# Patient Record
Sex: FEMALE | Race: BLACK OR AFRICAN AMERICAN | NOT HISPANIC OR LATINO | Employment: FULL TIME | URBAN - METROPOLITAN AREA
[De-identification: names, ages, dates, MRNs, and addresses within clinical notes are randomized per-mention and may not be internally consistent; named-entity substitution may affect disease eponyms.]

---

## 2017-01-05 ENCOUNTER — TELEPHONE (OUTPATIENT)
Dept: INTERNAL MEDICINE | Facility: CLINIC | Age: 57
End: 2017-01-05

## 2017-01-05 NOTE — TELEPHONE ENCOUNTER
Spoke to pt. Pt states she is having chest pains and wants to know what to do. Advised pt to go to ER for chest pains and to follow up with dr Cat after. Pt verbalized understanding

## 2017-01-09 ENCOUNTER — CLINICAL SUPPORT (OUTPATIENT)
Dept: CARDIOLOGY | Facility: CLINIC | Age: 57
End: 2017-01-09
Payer: COMMERCIAL

## 2017-01-09 ENCOUNTER — OFFICE VISIT (OUTPATIENT)
Dept: CARDIOLOGY | Facility: CLINIC | Age: 57
End: 2017-01-09
Payer: COMMERCIAL

## 2017-01-09 VITALS
DIASTOLIC BLOOD PRESSURE: 96 MMHG | HEIGHT: 61 IN | BODY MASS INDEX: 23.43 KG/M2 | SYSTOLIC BLOOD PRESSURE: 178 MMHG | WEIGHT: 124.13 LBS | HEART RATE: 75 BPM

## 2017-01-09 DIAGNOSIS — I10 ESSENTIAL HYPERTENSION: ICD-10-CM

## 2017-01-09 DIAGNOSIS — R94.31 ABNORMAL ECG: ICD-10-CM

## 2017-01-09 DIAGNOSIS — E11.9 TYPE 2 DIABETES MELLITUS WITHOUT COMPLICATION, WITH LONG-TERM CURRENT USE OF INSULIN: ICD-10-CM

## 2017-01-09 DIAGNOSIS — Z79.4 TYPE 2 DIABETES MELLITUS WITHOUT COMPLICATION, WITH LONG-TERM CURRENT USE OF INSULIN: ICD-10-CM

## 2017-01-09 DIAGNOSIS — R07.89 ATYPICAL CHEST PAIN: Primary | ICD-10-CM

## 2017-01-09 DIAGNOSIS — R07.9 CHEST PAIN, UNSPECIFIED TYPE: ICD-10-CM

## 2017-01-09 DIAGNOSIS — E78.2 HYPERLIPIDEMIA, MIXED: ICD-10-CM

## 2017-01-09 DIAGNOSIS — R06.09 DOE (DYSPNEA ON EXERTION): ICD-10-CM

## 2017-01-09 PROCEDURE — 99204 OFFICE O/P NEW MOD 45 MIN: CPT | Mod: S$GLB,,, | Performed by: INTERNAL MEDICINE

## 2017-01-09 PROCEDURE — 93000 ELECTROCARDIOGRAM COMPLETE: CPT | Mod: S$GLB,,, | Performed by: INTERNAL MEDICINE

## 2017-01-09 PROCEDURE — 4010F ACE/ARB THERAPY RXD/TAKEN: CPT | Mod: S$GLB,,, | Performed by: INTERNAL MEDICINE

## 2017-01-09 PROCEDURE — 1159F MED LIST DOCD IN RCRD: CPT | Mod: S$GLB,,, | Performed by: INTERNAL MEDICINE

## 2017-01-09 PROCEDURE — 99999 PR PBB SHADOW E&M-EST. PATIENT-LVL III: CPT | Mod: PBBFAC,,, | Performed by: INTERNAL MEDICINE

## 2017-01-09 PROCEDURE — 3077F SYST BP >= 140 MM HG: CPT | Mod: S$GLB,,, | Performed by: INTERNAL MEDICINE

## 2017-01-09 PROCEDURE — 2022F DILAT RTA XM EVC RTNOPTHY: CPT | Mod: S$GLB,,, | Performed by: INTERNAL MEDICINE

## 2017-01-09 PROCEDURE — 3080F DIAST BP >= 90 MM HG: CPT | Mod: S$GLB,,, | Performed by: INTERNAL MEDICINE

## 2017-01-09 PROCEDURE — 3046F HEMOGLOBIN A1C LEVEL >9.0%: CPT | Mod: S$GLB,,, | Performed by: INTERNAL MEDICINE

## 2017-01-09 RX ORDER — LISINOPRIL 5 MG/1
5 TABLET ORAL DAILY
Qty: 90 TABLET | Refills: 3 | Status: SHIPPED | OUTPATIENT
Start: 2017-01-09 | End: 2018-01-19 | Stop reason: DRUGHIGH

## 2017-01-09 NOTE — PROGRESS NOTES
"Subjective:    Patient ID:  Leesa Le is a 56 y.o. female who presents for evaluation of Chest Pain      HPI Mrs. Le presents for evaluation.   Pt last seen by [facility] 1/7/14.   Her current medical conditions include DM, GENTRY, hyperlipidemia. Nonsmoker.   s/p C 12/13 for abnormal stress echo and her coronary arteries were angiographically normal, normal LVEF, normal LVEDP  Now here for eval of cp again.  [facility] dx with chest wall pain, inflammation.  CP mid chest, aching/sharp pain, seconds to 1 minute, nonexertional.  Associated with dyspnea.  Cp x one month or less.  ecg today shows NSR, chronic inferior st-t abnl.  DM remains above goal as well as her lipids (statin intolerant).    Patient Active Problem List   Diagnosis    Type 2 diabetes mellitus without complication    Goiter, nontoxic, multinodular    Drug intolerance    Hyperlipidemia, mixed    Abnormal ECG    DELCID (dyspnea on exertion)    Atypical chest pain     Review of Systems   Constitution: Negative.   HENT: Negative.    Eyes: Negative.    Cardiovascular: Positive for chest pain and dyspnea on exertion.   Respiratory: Positive for shortness of breath.    Endocrine: Negative.    Hematologic/Lymphatic: Negative.    Skin: Negative.    Musculoskeletal: Negative.    Gastrointestinal: Negative.    Genitourinary: Negative.    Neurological: Negative.    Psychiatric/Behavioral: Negative.    Allergic/Immunologic: Negative.        Visit Vitals    BP (!) 178/96 (BP Location: Left arm, Patient Position: Sitting)    Pulse 75    Ht 5' 1" (1.549 m)    Wt 56.3 kg (124 lb 1.9 oz)    BMI 23.45 kg/m2       Wt Readings from Last 3 Encounters:   01/09/17 56.3 kg (124 lb 1.9 oz)   11/07/16 55.5 kg (122 lb 5.7 oz)   07/06/16 54.7 kg (120 lb 9.5 oz)     Temp Readings from Last 3 Encounters:   11/07/16 97.3 °F (36.3 °C) (Tympanic)   07/06/16 98.3 °F (36.8 °C) (Tympanic)   06/08/16 98 °F (36.7 °C) (Tympanic) "     BP Readings from Last 3 Encounters:   01/09/17 (!) 178/96   11/07/16 132/86   07/06/16 118/84     Pulse Readings from Last 3 Encounters:   01/09/17 75   11/07/16 72   07/06/16 75         RECHECK 160/90     Objective:    Physical Exam   Constitutional: She is oriented to person, place, and time. Vital signs are normal. She appears well-developed and well-nourished. She is active and cooperative. She does not have a sickly appearance. She does not appear ill. No distress.   HENT:   Head: Normocephalic.   Neck: Neck supple. Normal carotid pulses, no hepatojugular reflux and no JVD present. Carotid bruit is not present. No thyromegaly present.   Cardiovascular: Normal rate, regular rhythm, S1 normal, S2 normal, normal heart sounds and normal pulses.  PMI is not displaced.  Exam reveals no gallop and no friction rub.    No murmur heard.  Pulses:       Radial pulses are 2+ on the right side, and 2+ on the left side.   Pulmonary/Chest: Effort normal and breath sounds normal. She has no wheezes. She has no rales.   Abdominal: Soft. Normal appearance, normal aorta and bowel sounds are normal. She exhibits no pulsatile liver, no abdominal bruit, no ascites and no mass. There is no splenomegaly or hepatomegaly. There is no tenderness.   Musculoskeletal: She exhibits no edema.   Lymphadenopathy:     She has no cervical adenopathy.   Neurological: She is alert and oriented to person, place, and time.   Skin: Skin is warm. She is not diaphoretic.   Psychiatric: She has a normal mood and affect. Her behavior is normal.   Nursing note and vitals reviewed.      I have reviewed all pertinent labs and cardiac studies.      Chemistry        Component Value Date/Time     11/08/2016 0822    K 4.6 11/08/2016 0822     11/08/2016 0822    CO2 27 11/08/2016 0822    BUN 16 11/08/2016 0822    CREATININE 0.8 11/08/2016 0822     (H) 11/08/2016 0822        Component Value Date/Time    CALCIUM 8.9 11/08/2016 0822    ALKPHOS  82 11/08/2016 0822    AST 17 11/08/2016 0822    ALT 18 11/08/2016 0822    BILITOT 0.4 11/08/2016 0822        Lab Results   Component Value Date    WBC 7.81 06/08/2016    HGB 11.3 (L) 06/08/2016    HCT 33.8 (L) 06/08/2016    MCV 69 (L) 06/08/2016     06/08/2016     Lab Results   Component Value Date    HGBA1C 9.2 (H) 11/08/2016     Lab Results   Component Value Date    CHOL 261 (H) 11/08/2016    CHOL 259 (H) 06/08/2016    CHOL 260 (H) 12/10/2015     Lab Results   Component Value Date    HDL 62 11/08/2016    HDL 69 06/08/2016    HDL 55 12/10/2015     Lab Results   Component Value Date    LDLCALC 169.2 (H) 11/08/2016    LDLCALC 161.6 (H) 06/08/2016    LDLCALC 168.2 (H) 12/10/2015     Lab Results   Component Value Date    TRIG 149 11/08/2016    TRIG 142 06/08/2016    TRIG 184 (H) 12/10/2015     Lab Results   Component Value Date    CHOLHDL 23.8 11/08/2016    CHOLHDL 26.6 06/08/2016    CHOLHDL 21.2 12/10/2015           Assessment:       1. Atypical chest pain    2. DELCID (dyspnea on exertion)    3. Chest pain, unspecified type    4. Type 2 diabetes mellitus without complication, with long-term current use of insulin    5. Hyperlipidemia, mixed    6. Abnormal ECG    7. Essential hypertension         Plan:             ATYPICAL CP, MAY BE STRESS/ANXIETY RELATED.  MULTIPLE RISK FACTORS FOR CAD.  POORLY CONTROLLED DM AND LIPIDS.  ELEVATED BP.    RECOMMENDATIONS:    STRESS CARDIOLITE  ECHOCARDIOGRAM  LISINOPRIL 5 MG DAILY (PT INFORMED OF INDICATIONS, SIDE EFFECT PROFILE)    F/U 3 - 4 WEEKS WITH MID-LEVEL FOR HTN F/U.

## 2017-01-09 NOTE — MR AVS SNAPSHOT
[facility]  [address]  [place] [address]  Phone: [phone]  Fax: [phone]                  Leesa Le   2017 4:40 PM   Office Visit    Description:  Female : 1960   Provider:  Galen Simeon MD   Department:  [facility] - Cardiology           Reason for Visit     Chest Pain           Diagnoses this Visit        Comments    Atypical chest pain    -  Primary     DELCID (dyspnea on exertion)         Chest pain, unspecified type         Type 2 diabetes mellitus without complication, with long-term current use of insulin         Hyperlipidemia, mixed         Abnormal ECG         Essential hypertension                To Do List           Future Appointments        Provider Department Dept Phone    2017 9:00 AM Rosangela Cat MD [facility] Internal Medicine [phone]      Goals (5 Years of Data)     None       These Medications        Disp Refills Start End    lisinopril (PRINIVIL,ZESTRIL) 5 MG tablet 90 tablet 3 2017    Take 1 tablet (5 mg total) by mouth once daily. - Oral    Pharmacy: [facility] [address] [place] [address] [place] [address] [address] AT [facility] Ph #: [phone]         [facility] On Call     [facility] On Call Nurse Care Line -  Assistance  Registered nurses in the [facility] Center provide clinical advisement, health education, appointment booking, and other advisory services.  Call for this free service at [phone].             Medications           Message regarding Medications     Verify the changes and/or additions to your medication regime listed below are the same as discussed with your clinician today.  If any of these changes or additions are incorrect, please notify your healthcare provider.        START taking these NEW medications        Refills    lisinopril (PRINIVIL,ZESTRIL) 5 MG tablet 3    Sig: Take 1 tablet (5 mg total) by mouth once daily.    Class: Normal    Route: Oral           Verify that  "the below list of medications is an accurate representation of the medications you are currently taking.  If none reported, the list may be blank. If incorrect, please contact your healthcare provider. Carry this list with you in case of emergency.           Current Medications     BD INSULIN PEN NEEDLE UF MINI 31 x 3/16 " Ndle USE ONCE DAILY    CONTOUR NEXT STRIPS Strp TEST BLOOD SUGAR FOUR TIMES DAILY    escitalopram oxalate (LEXAPRO) 10 MG tablet Take 1 tablet (10 mg total) by mouth once daily.    glyBURIDE-metformin 5-500 mg (GLUCOVANCE) 5-500 mg Tab TAKE ONE TABLET BY MOUTH TWICE DAILY WITH MEALS    insulin glargine (LANTUS SOLOSTAR) 100 unit/mL (3 mL) InPn pen Inject 35 Units into the skin every evening.    insulin needles, disposable, (BD INSULIN PEN NEEDLE UF MINI) 31 x 3/16 " Ndle To take insulin once daily    LANCETS MISC by Misc.(Non-Drug; Combo Route) route 2 (two) times daily.    NOVOLOG FLEXPEN 100 unit/mL InPn pen 3 (three) times daily with meals. As per SSI    lisinopril (PRINIVIL,ZESTRIL) 5 MG tablet Take 1 tablet (5 mg total) by mouth once daily.           Clinical Reference Information           Vital Signs - Last Recorded  Most recent update: 1/9/2017  4:44 PM by Chica Lozano    BP Pulse Ht Wt BMI    (!) 178/96 (BP Location: Left arm, Patient Position: Sitting) 75 5' 1" (1.549 m) 56.3 kg (124 lb 1.9 oz) 23.45 kg/m2      Blood Pressure          Most Recent Value    Right Arm BP - Sitting  172/98    Left Arm BP - Sitting  178/96    BP  (!)  178/96      Allergies as of 1/9/2017     Pcn [Penicillins]    Statins-hmg-coa Reductase Inhibitors      Immunizations Administered on Date of Encounter - 1/9/2017     None      Orders Placed During Today's Visit     Future Labs/Procedures Expected by Expires    NM Myocardial Perfusion Spect Multi Exer  1/9/2017 1/9/2018    2D echo with color flow doppler  As directed 1/9/2018    NM Multi Tread Stress Cardiac Component  As directed 1/9/2018    SCHEDULED EKG " 12-LEAD (to Muse)  As directed 1/9/2018

## 2017-01-13 ENCOUNTER — CLINICAL SUPPORT (OUTPATIENT)
Dept: CARDIOLOGY | Facility: CLINIC | Age: 57
End: 2017-01-13
Payer: COMMERCIAL

## 2017-01-13 ENCOUNTER — HOSPITAL ENCOUNTER (OUTPATIENT)
Dept: RADIOLOGY | Facility: HOSPITAL | Age: 57
Discharge: HOME | End: 2017-01-13
Attending: INTERNAL MEDICINE
Payer: COMMERCIAL

## 2017-01-13 ENCOUNTER — TELEPHONE (OUTPATIENT)
Dept: CARDIOLOGY | Facility: CLINIC | Age: 57
End: 2017-01-13

## 2017-01-13 DIAGNOSIS — R06.09 DOE (DYSPNEA ON EXERTION): ICD-10-CM

## 2017-01-13 DIAGNOSIS — Z79.4 TYPE 2 DIABETES MELLITUS WITHOUT COMPLICATION, WITH LONG-TERM CURRENT USE OF INSULIN: ICD-10-CM

## 2017-01-13 DIAGNOSIS — R94.31 ABNORMAL ECG: ICD-10-CM

## 2017-01-13 DIAGNOSIS — R07.89 ATYPICAL CHEST PAIN: ICD-10-CM

## 2017-01-13 DIAGNOSIS — E11.9 TYPE 2 DIABETES MELLITUS WITHOUT COMPLICATION, WITH LONG-TERM CURRENT USE OF INSULIN: ICD-10-CM

## 2017-01-13 LAB
DIASTOLIC DYSFUNCTION: NO
DIASTOLIC DYSFUNCTION: NO
ESTIMATED PA SYSTOLIC PRESSURE: 27.21
MITRAL VALVE MOBILITY: NORMAL
RETIRED EF AND QEF - SEE NOTES: 65 (ref 55–65)

## 2017-01-13 PROCEDURE — 93306 TTE W/DOPPLER COMPLETE: CPT | Mod: S$GLB,,, | Performed by: INTERNAL MEDICINE

## 2017-01-13 PROCEDURE — 93015 CV STRESS TEST SUPVJ I&R: CPT | Mod: S$GLB,,, | Performed by: INTERNAL MEDICINE

## 2017-01-13 PROCEDURE — 78452 HT MUSCLE IMAGE SPECT MULT: CPT | Mod: 26,,, | Performed by: INTERNAL MEDICINE

## 2017-01-13 PROCEDURE — 78452 HT MUSCLE IMAGE SPECT MULT: CPT | Mod: TC,PO

## 2017-01-13 NOTE — TELEPHONE ENCOUNTER
Please call pt.  She passed her nuclear stress test  No blockages noted  Echo shows normal heart strength/function.    F/u next week w Jana for HTN check    Dr Simeon

## 2017-01-16 ENCOUNTER — TELEPHONE (OUTPATIENT)
Dept: CARDIOLOGY | Facility: CLINIC | Age: 57
End: 2017-01-16

## 2017-01-16 ENCOUNTER — OFFICE VISIT (OUTPATIENT)
Dept: CARDIOLOGY | Facility: CLINIC | Age: 57
End: 2017-01-16
Payer: COMMERCIAL

## 2017-01-16 ENCOUNTER — LAB VISIT (OUTPATIENT)
Dept: LAB | Facility: HOSPITAL | Age: 57
End: 2017-01-16
Attending: INTERNAL MEDICINE
Payer: COMMERCIAL

## 2017-01-16 VITALS
HEIGHT: 61 IN | BODY MASS INDEX: 20.82 KG/M2 | DIASTOLIC BLOOD PRESSURE: 80 MMHG | SYSTOLIC BLOOD PRESSURE: 150 MMHG | WEIGHT: 110.25 LBS | HEART RATE: 76 BPM

## 2017-01-16 DIAGNOSIS — E78.2 HYPERLIPIDEMIA, MIXED: ICD-10-CM

## 2017-01-16 DIAGNOSIS — R07.89 ATYPICAL CHEST PAIN: ICD-10-CM

## 2017-01-16 DIAGNOSIS — I10 ESSENTIAL HYPERTENSION: ICD-10-CM

## 2017-01-16 DIAGNOSIS — E11.9 TYPE 2 DIABETES MELLITUS WITHOUT COMPLICATION, WITH LONG-TERM CURRENT USE OF INSULIN: ICD-10-CM

## 2017-01-16 DIAGNOSIS — I10 ESSENTIAL HYPERTENSION: Primary | ICD-10-CM

## 2017-01-16 DIAGNOSIS — Z79.4 TYPE 2 DIABETES MELLITUS WITHOUT COMPLICATION, WITH LONG-TERM CURRENT USE OF INSULIN: ICD-10-CM

## 2017-01-16 DIAGNOSIS — R06.09 DOE (DYSPNEA ON EXERTION): ICD-10-CM

## 2017-01-16 LAB
ANION GAP SERPL CALC-SCNC: 14 MMOL/L
BUN SERPL-MCNC: 14 MG/DL
CALCIUM SERPL-MCNC: 9.9 MG/DL
CHLORIDE SERPL-SCNC: 98 MMOL/L
CO2 SERPL-SCNC: 25 MMOL/L
CREAT SERPL-MCNC: 1 MG/DL
EST. GFR  (AFRICAN AMERICAN): >60 ML/MIN/1.73 M^2
EST. GFR  (NON AFRICAN AMERICAN): >60 ML/MIN/1.73 M^2
GLUCOSE SERPL-MCNC: 355 MG/DL
POTASSIUM SERPL-SCNC: 4.3 MMOL/L
SODIUM SERPL-SCNC: 137 MMOL/L

## 2017-01-16 PROCEDURE — 1159F MED LIST DOCD IN RCRD: CPT | Mod: S$GLB,,, | Performed by: PHYSICIAN ASSISTANT

## 2017-01-16 PROCEDURE — 80048 BASIC METABOLIC PNL TOTAL CA: CPT | Mod: PO

## 2017-01-16 PROCEDURE — 99999 PR PBB SHADOW E&M-EST. PATIENT-LVL III: CPT | Mod: PBBFAC,,, | Performed by: PHYSICIAN ASSISTANT

## 2017-01-16 PROCEDURE — 99214 OFFICE O/P EST MOD 30 MIN: CPT | Mod: S$GLB,,, | Performed by: PHYSICIAN ASSISTANT

## 2017-01-16 PROCEDURE — 3046F HEMOGLOBIN A1C LEVEL >9.0%: CPT | Mod: S$GLB,,, | Performed by: PHYSICIAN ASSISTANT

## 2017-01-16 PROCEDURE — 36415 COLL VENOUS BLD VENIPUNCTURE: CPT | Mod: PO

## 2017-01-16 PROCEDURE — 4010F ACE/ARB THERAPY RXD/TAKEN: CPT | Mod: S$GLB,,, | Performed by: PHYSICIAN ASSISTANT

## 2017-01-16 PROCEDURE — 3077F SYST BP >= 140 MM HG: CPT | Mod: S$GLB,,, | Performed by: PHYSICIAN ASSISTANT

## 2017-01-16 PROCEDURE — 2022F DILAT RTA XM EVC RTNOPTHY: CPT | Mod: S$GLB,,, | Performed by: PHYSICIAN ASSISTANT

## 2017-01-16 PROCEDURE — 3079F DIAST BP 80-89 MM HG: CPT | Mod: S$GLB,,, | Performed by: PHYSICIAN ASSISTANT

## 2017-01-16 NOTE — TELEPHONE ENCOUNTER
----- Message from RUBEN Guevara sent at 1/16/2017  2:40 PM CST -----  Please phone patient. Kidney function stable. Please have her increase Lisinopril to 10 mg daily. Call office if any side effects. Needs phone review in 2 weeks for BP check    Thanks

## 2017-01-16 NOTE — PROGRESS NOTES
Subjective:       Patient ID: Leesa Le is a 56 y.o. female.    Chief Complaint: Review test results    HPI   Mrs. Le is a 56 year old female patient with a PMHx of DM, GENTRY, and hyperlipidemia who presents today for follow-up/review of results. Patient previously seen by Dr. Simeon and had MPI stress test and 2D echo for further evaluation of chest pain/SOB. She returns today and states she is doing ok. Still complains of SOB, states it can occur at anytime. Unable to identify any precipitating or alleviating factors. No other symptoms. No recent chest pain episodes. Denies any palpitations, lightheadedness, near syncope, or syncope. BP still elevated but much better than last visit. Patient reports compliance with her medications. Concerned about weight loss (down about 10-12 lbs, but states she has been stressed recently). Tests reviewed and discussed in detail with patient. 2D echo showed normal EF. MPI stress test negative for ischemia. Of note, patient also had LHC in 2013 that showed normal coronaries.     Review of Systems   Constitutional: Positive for unexpected weight change. Negative for activity change, appetite change, chills, fatigue and fever.   HENT: Negative for congestion, ear discharge and sore throat.    Eyes: Negative for discharge.   Respiratory: Positive for shortness of breath (chronic, unchanged). Negative for cough, chest tightness and wheezing.    Cardiovascular: Negative for chest pain, palpitations and leg swelling.   Gastrointestinal: Negative for abdominal pain, constipation, diarrhea, nausea and vomiting.   Endocrine: Negative for cold intolerance and heat intolerance.   Genitourinary: Negative for hematuria.   Musculoskeletal: Negative for arthralgias, back pain and joint swelling.   Skin: Negative for color change, pallor and wound.   Allergic/Immunologic: Negative.    Neurological: Negative for dizziness, seizures, syncope, weakness, light-headedness and numbness.  "  Psychiatric/Behavioral: Negative for hallucinations and sleep disturbance. The patient is not nervous/anxious.        Visit Vitals    BP (!) 150/80    Pulse 76    Ht 5' 1" (1.549 m)    Wt 50 kg (110 lb 3.7 oz)    BMI 20.83 kg/m2     Objective:      Physical Exam   Constitutional: She is oriented to person, place, and time. She appears well-developed and well-nourished. No distress.   HENT:   Head: Normocephalic and atraumatic.   Eyes: EOM are normal. Pupils are equal, round, and reactive to light. Right eye exhibits no discharge. Left eye exhibits no discharge.   Neck: Neck supple. No JVD present. No tracheal deviation present.   Cardiovascular: Normal rate, regular rhythm, S1 normal, S2 normal and normal heart sounds.  Exam reveals no gallop and no friction rub.    No murmur heard.  Pulses:       Radial pulses are 2+ on the right side, and 2+ on the left side.   Pulmonary/Chest: Effort normal and breath sounds normal. No respiratory distress. She has no wheezes. She has no rales.   Abdominal: Soft. Bowel sounds are normal. She exhibits no distension and no mass. There is no tenderness. There is no guarding.   Musculoskeletal: She exhibits no edema.   Neurological: She is alert and oriented to person, place, and time.   Skin: Skin is warm and dry. No rash noted. She is not diaphoretic. No erythema.   Psychiatric: She has a normal mood and affect. Her behavior is normal. Thought content normal.   Nursing note and vitals reviewed.      Impression: NORMAL MYOCARDIAL PERFUSION  1. The perfusion scan is free of evidence for myocardial ischemia or injury.   2. Resting wall motion is physiologic.   3. Resting LV function is normal.   4. The ventricular volumes are normal at rest and stress.   5. The extracardiac distribution of radioactivity is normal.     CONCLUSIONS     1 - Concentric remodeling.     2 - Normal left ventricular systolic function (EF 60-65%).     3 - Normal left ventricular diastolic function. "     4 - Normal right ventricular systolic function .     5 - The estimated PA systolic pressure is 27 mmHg.         C. ANGIOGRAPHIC RESULTS:    DIAGNOSTIC:       Patient has a right dominant coronary artery.        - Left Main Coronary Artery:             The LM is normal. There is LORETTA 3 flow.       - Left Anterior Descending Artery:             The LAD is normal. There is LORETTA 3 flow.       - D1:             The D1 is normal. There is LORETTA 3 flow.       - Left Circumflex Artery:             The LCX is normal. There is LORETTA 3 flow.       - OM1:             The OM1 is normal. There is LORETTA 3 flow.       - Ramus:             The ramus is normal. There is LORETTA 3 flow.       - Right Coronary Artery:             The RCA is normal. There is LORETTA 3 flow.       - Common Femoral Artery:             The right CFA is normal.      D. SUMMARY:    1. Normal coronary arteries.  2. Normal LVEF.    E. RECOMMENDATIONS:    1. Routine post-cath care.  2. Risk factor reductions.  Assessment:       1. Essential hypertension    2. Atypical chest pain    3. DELCID (dyspnea on exertion)    4. Type 2 diabetes mellitus without complication, with long-term current use of insulin    5. Hyperlipidemia, mixed          Patient doing clinically well. Still with SOB, offered pulmonary referral, however patient stated she would like to wait. Stress test and echo negative as well as negative cath in 2013. BP better controlled, still above goal. Will check BMP today and plan on increasing to 10 mg daily if lab ok. Counseled about risk factor modification.   Plan:   -BMP today with phone review, will plan to increase Lisinopril to 10 mg daily  -Continue current meds  -Declined pulmonary referral for now  -Risk factor modification, can f/u with PCP, also advised to discuss weight loss   -RTC prn      Chart reviewed. Dr. Simeon agrees with  plan as outlined above.

## 2017-01-16 NOTE — MR AVS SNAPSHOT
"    [facility] - Cardiology  [address] [place] [address]  Phone: [phone]  Fax: [phone]                  Leesa Le   2017 8:00 AM   Office Visit    Description:  Female : 1960   Provider:  RUBEN Guevara   [facility]  [facility] - Cardiology           Reason for Visit     Results           Diagnoses this Visit        Comments    Essential hypertension    -  Primary     Atypical chest pain         DELCID (dyspnea on exertion)         Type 2 diabetes mellitus without complication, with long-term current use of insulin         Hyperlipidemia, mixed                To Do List           Future Appointments        Provider Department Dept Phone    2017 9:00 AM Rosangela Cat MD [facility] Internal Medicine [phone]      Goals (5 Years of Data)     None      [facility]     [facility] On Call Nurse Care Line -  Assistance  Registered nurses in the [facility] On Call Center provide clinical advisement, health education, appointment booking, and other advisory services.  Call for this free service at [phone].             Medications           Message regarding Medications     Verify the changes and/or additions to your medication regime listed below are the same as discussed with your clinician today.  If any of these changes or additions are incorrect, please notify your healthcare provider.             Verify that the below list of medications is an accurate representation of the medications you are currently taking.  If none reported, the list may be blank. If incorrect, please contact your healthcare provider. Carry this list with you in case of emergency.           Current Medications     BD INSULIN PEN NEEDLE UF MINI 31 x 3/16 " Ndle USE ONCE DAILY    CONTOUR NEXT STRIPS Strp TEST BLOOD SUGAR FOUR TIMES DAILY    escitalopram oxalate (LEXAPRO) 10 MG tablet Take 1 tablet (10 mg total) by mouth once daily.    glyBURIDE-metformin 5-500 mg (GLUCOVANCE) 5-500 mg Tab TAKE ONE " "TABLET BY MOUTH TWICE DAILY WITH MEALS    insulin glargine (LANTUS SOLOSTAR) 100 unit/mL (3 mL) InPn pen Inject 35 Units into the skin every evening.    insulin needles, disposable, (BD INSULIN PEN NEEDLE UF MINI) 31 x 3/16 " Ndle To take insulin once daily    LANCETS MISC by Misc.(Non-Drug; Combo Route) route 2 (two) times daily.    lisinopril (PRINIVIL,ZESTRIL) 5 MG tablet Take 1 tablet (5 mg total) by mouth once daily.    NOVOLOG FLEXPEN 100 unit/mL InPn pen 3 (three) times daily with meals. As per SSI           Clinical Reference Information           Vital Signs - Last Recorded  Most recent update: 1/16/2017  8:13 AM by Nalini Beard MA    BP Pulse Ht Wt BMI    (!) 150/80 76 5' 1" (1.549 m) 50 kg (110 lb 3.7 oz) 20.83 kg/m2      Blood Pressure          Most Recent Value    BP  (!)  150/80      Allergies as of 1/16/2017     Pcn [Penicillins]    Statins-hmg-coa Reductase Inhibitors      Immunizations Administered on Date of Encounter - 1/16/2017     None      Orders Placed During Today's Visit     Future Labs/Procedures Expected by Expires    Basic metabolic panel  1/16/2017 3/17/2018      "

## 2017-01-31 ENCOUNTER — TELEPHONE (OUTPATIENT)
Dept: CARDIOLOGY | Facility: CLINIC | Age: 57
End: 2017-01-31

## 2017-01-31 NOTE — TELEPHONE ENCOUNTER
----- Message from Nalini Beard MA sent at 1/16/2017  3:17 PM CST -----  Regarding: phone review  Phone review 2 week bp check.

## 2017-02-07 ENCOUNTER — TELEPHONE (OUTPATIENT)
Dept: CARDIOLOGY | Facility: CLINIC | Age: 57
End: 2017-02-07

## 2017-03-02 ENCOUNTER — OFFICE VISIT (OUTPATIENT)
Dept: INTERNAL MEDICINE | Facility: CLINIC | Age: 57
End: 2017-03-02
Payer: COMMERCIAL

## 2017-03-02 VITALS
OXYGEN SATURATION: 99 % | TEMPERATURE: 102 F | HEIGHT: 61 IN | SYSTOLIC BLOOD PRESSURE: 160 MMHG | BODY MASS INDEX: 23.14 KG/M2 | WEIGHT: 122.56 LBS | DIASTOLIC BLOOD PRESSURE: 84 MMHG | HEART RATE: 112 BPM

## 2017-03-02 DIAGNOSIS — J11.1 CORYZA WITH FLU: ICD-10-CM

## 2017-03-02 DIAGNOSIS — J00 CORYZA: Primary | ICD-10-CM

## 2017-03-02 LAB
DEPRECATED S PYO AG THROAT QL EIA: NEGATIVE
FLUAV AG SPEC QL IA: NEGATIVE
FLUBV AG SPEC QL IA: NEGATIVE
SPECIMEN SOURCE: NORMAL

## 2017-03-02 PROCEDURE — 87400 INFLUENZA A/B EACH AG IA: CPT | Mod: PO

## 2017-03-02 PROCEDURE — 99999 PR PBB SHADOW E&M-EST. PATIENT-LVL III: CPT | Mod: PBBFAC,,, | Performed by: PHYSICIAN ASSISTANT

## 2017-03-02 PROCEDURE — 3079F DIAST BP 80-89 MM HG: CPT | Mod: S$GLB,,, | Performed by: PHYSICIAN ASSISTANT

## 2017-03-02 PROCEDURE — 1160F RVW MEDS BY RX/DR IN RCRD: CPT | Mod: S$GLB,,, | Performed by: PHYSICIAN ASSISTANT

## 2017-03-02 PROCEDURE — 87081 CULTURE SCREEN ONLY: CPT

## 2017-03-02 PROCEDURE — 3077F SYST BP >= 140 MM HG: CPT | Mod: S$GLB,,, | Performed by: PHYSICIAN ASSISTANT

## 2017-03-02 PROCEDURE — 99213 OFFICE O/P EST LOW 20 MIN: CPT | Mod: S$GLB,,, | Performed by: PHYSICIAN ASSISTANT

## 2017-03-02 PROCEDURE — 87880 STREP A ASSAY W/OPTIC: CPT | Mod: PO

## 2017-03-02 RX ORDER — OSELTAMIVIR PHOSPHATE 75 MG/1
75 CAPSULE ORAL 2 TIMES DAILY
Qty: 10 CAPSULE | Refills: 0 | Status: SHIPPED | OUTPATIENT
Start: 2017-03-02 | End: 2017-03-07

## 2017-03-02 NOTE — MR AVS SNAPSHOT
"    [facility]  [address] [facility] [address] [address] [place] [address]  Phone: [phone]  Fax: [phone]                  Leesa Le   3/2/2017 1:40 PM   Office Visit    Description:  Female : 1960   Provider:  Sheyla Eaton PA-C   Department:  [facility]           Reason for Visit     URI     Sore Throat     Generalized Body Aches           Diagnoses this Visit        Comments    [place]    -  Primary            To Do List           Future Appointments        Provider Department Dept Phone    2017 9:00 AM Rosangela Cat MD [facility] [phone]      Goals (5 Years of Data)     None      Follow-Up and Disposition     Return if symptoms worsen or fail to improve.      [facility]     [facility] On [facility] Nurse Care Line -  Assistance  Registered nurses in the [facility] provide clinical advisement, health education, appointment booking, and other advisory services.  Call for this free service at [phone].             Medications           Message regarding Medications     Verify the changes and/or additions to your medication regime listed below are the same as discussed with your clinician today.  If any of these changes or additions are incorrect, please notify your healthcare provider.             Verify that the below list of medications is an accurate representation of the medications you are currently taking.  If none reported, the list may be blank. If incorrect, please contact your healthcare provider. Carry this list with you in case of emergency.           Current Medications     BD INSULIN PEN NEEDLE UF MINI 31 x 3/16 " Ndle USE ONCE DAILY    CONTOUR NEXT STRIPS Strp TEST BLOOD SUGAR FOUR TIMES DAILY    escitalopram oxalate (LEXAPRO) 10 MG tablet Take 1 tablet (10 mg total) by mouth once daily.    glyBURIDE-metformin 5-500 mg (GLUCOVANCE) 5-500 mg Tab TAKE ONE TABLET BY MOUTH TWICE DAILY WITH MEALS    insulin glargine " "(LANTUS SOLOSTAR) 100 unit/mL (3 mL) InPn pen Inject 35 Units into the skin every evening.    insulin needles, disposable, (BD INSULIN PEN NEEDLE UF MINI) 31 x 3/16 " Ndle To take insulin once daily    LANCETS MISC by Misc.(Non-Drug; Combo Route) route 2 (two) times daily.    lisinopril (PRINIVIL,ZESTRIL) 5 MG tablet Take 1 tablet (5 mg total) by mouth once daily.    NOVOLOG FLEXPEN 100 unit/mL InPn pen 3 (three) times daily with meals. As per SSI           Clinical Reference Information           Your Vitals Were     BP                   160/84           Blood Pressure          Most Recent Value    BP  (!)  160/84      Allergies as of 3/2/2017     Pcn [Penicillins]    Statins-hmg-coa Reductase Inhibitors      Immunizations Administered on Date of Encounter - 3/2/2017     None      Orders Placed During Today's Visit      Normal Orders This Visit    Influenza antigen Nasopharyngeal Swab     Throat Screen, Rapid       Instructions      Adult Self-Care for Colds  Colds are caused by viruses. They cant be cured with antibiotics. However, you can relieve symptoms and support your bodys efforts to heal itself. No matter which symptoms you have, be sure to drink plenty of fluids (water or clear soup); stop smoking and drinking alcohol; and get plenty of rest.   Understand a fever  · Take your temperature several times a day. If your fever is 100.4°F (38.0°C) for more than a day, call your doctor.  · Relax, lie down. Go to bed if you want. Just get off your feet and rest. Also, drink plenty of fluids to avoid dehydration.  · Take acetaminophen or a nonsteroidal anti-inflammatory agent (NSAID), such as ibuprofen.  Treat a troubled nose kindly  · Breathe steam or heated humidified air to open blocked nasal passages.  a hot shower or use a vaporizer. Be careful not to get burned by the steam.  · Saline nasal sprays and decongestant tablets help open a stuffy nose. Antihistamines can also help, but they can cause side " effects such as drowsiness and drying of the eyes, nose, and mouth.  Soothe a sore throat and cough  · Gargle every 2 hours with 1/4 teaspoon of salt dissolved in 1/2 cup of warm water. Suck on throat lozenges and cough drops to moisten your throat.  · Cough medicines are available but it is unclear how effective they actually are.  · Take acetaminophen or an NSAID, such as ibuprofen to ease throat pain  Ease digestive problems  · Put fluid back into your body. Take frequent sips of clear liquids such as water or broth. Do not drink beverages with a lot of sugar in them, such as juices and sodas. These can make diarrhea worse. Older children and adults can drink sports drinks.  · As your appetite returns, you can resume your normal diet. Ask your doctor whether there are any foods you should avoid.     When to seek medical care  When you first notice symptoms, ask your health care provider if antiviral medications are appropriate. Antibiotics should not be taken for colds or flu. Also, call your doctor if you have any of the following symptoms or if you arent feeling better after 7 days:  · Shortness of breath  · Pain or pressure in the chest or abdomen  · Worsening symptoms, especially after a period of improvement  · Fever of 100.4°F  (38.0°C) or higher, or fever that doesnt go down with medication  · Sudden dizziness or confusion  · Severe or continued vomiting  · Signs of dehydration, including extreme thirst, dark urine, infrequent urination, dry mouth  · Spotted, red, or very sore throat   Date Last Reviewed: 6/19/2014  © 9509-1220 [employer]. [address] [place] [address], [place] [address]. All rights reserved. This information is not intended as a substitute for professional medical care. Always follow your healthcare professional's instructions.        Understanding the Cold Virus  Colds are the most common illness that people get. Most adults get 2 or 3 colds per year, and most children get 5 to  7 colds per year. Colds may be caused by over 200 types of viruses. The most common of these are rhinoviruses (rhino refers to the nose).  What causes a cold virus?  All colds start with infection by a virus. You can be infected by more than one cold virus at a time. Infection with cold viruses happens when:  · You breathe in a virus from the air. This can happen when someone with a cold sneezes or coughs near you.  · You touch your eyes, nose, or mouth when your hand has a cold virus on it. This can happen if you touch an object that has the cold virus on it.  What are the symptoms of a cold virus?  Almost all colds involve a stuffy nose. Other common symptoms include:  · Runny nose  · Sneezing  · Sore throat  · Headache  · Cough  How is a cold treated?  Colds usually last 5 to 10 days. Treatment focuses on relieving symptoms. Treatments may include:  · Decongestant medicines. Several types of decongestants are available without prescription. These may help reduce stuffy or runny nose symptoms.  · Prescription or over-the-counter nasal sprays. These may help reduce nasal symptoms, including stuffiness.  · Prescription or over-the-counter pain medicines. These can help with headaches and sore throat.  · Self-care. This includes extra rest, using humidifiers, and drinking more fluids. These help you feel better while you are getting over a cold.  Antibiotics are not helpful for a cold. They do not make a cold shorter or relieve symptoms. Taking antibiotics when you dont need them can make them work less well when you need them for another illness.  Follow all directions for using medicines, especially when giving them to children. Contact your healthcare provider if you have any questions about using cold medicines safely.  Can a cold be prevented?  You can help reduce the spread of cold viruses. This can help both you and others avoid getting colds. Follow these tips:  · Wash your hands well anytime you may have  come into contact with cold viruses. Wash your hands for at least 20 seconds. When you cant wash with soap and water, use an alcohol-based hand .  · Dont touch your nose, eyes, or mouth, especially after touching something that may have a cold virus on it.  · Cover your mouth and nose when you cough or sneeze. Throw away tissues after using them.  · Disinfect things you touch often, such as phones and keyboards.    · Stay home when you have a cold.  What are the possible complications of a cold virus?  Colds usually go away by themselves. But its not unusual to get another type of infection while you have a cold. These can include:  · Sinus infection  · Lung infection, such as bronchitis or pneumonia  · Ear infection  If you have asthma or chronic bronchitis, a cold can make your condition worse.     When should I call my healthcare provider?  Call your healthcare provider right away if you have any of these:  · Fever of 100.4°F (38°C) or higher, or as directed  · Cough, chest pain, or shortness of breath that gets worse  · Symptoms dont get better or get worse after about 10 days  · Headache, sleepiness, or confusion that gets worse   Date Last Reviewed: 3/28/2016  © 6530-2081 [employer]. [address] [place] [address], [place] [address]. All rights reserved. This information is not intended as a substitute for professional medical care. Always follow your healthcare professional's instructions.        Influenza     Viruses that cause influenza spread through the air in droplets when someone who has the flu coughs, sneezes, laughs, or talks.   Influenza (the flu) is an infection that affects your respiratory tract. This tract is made up of your mouth, nose, and lungs, and the passages between them. Unlike a cold, the flu can make you very ill. And it can lead to pneumonia, a serious lung infection. The flu can have serious complications and even be fatal for some people. These include older  adults, young children, and people with certain chronic conditions.  Who is at risk for the flu?  Anyone can get the flu. But you are more likely to become infected if you:  · Have a weakened immune system  · Work in a healthcare setting where you may be exposed to flu germs  · Live or work with someone who has the flu  · Havent had an annual flu shot  How does the flu spread?  The flu is caused by viruses. The viruses spread through the air in droplets when someone who has the flu coughs, sneezes, laughs, or talks. You can become infected when you inhale these viruses directly. You can also become infected when you touch a surface on which the droplets have landed and then transfer the germs to your eyes, nose, or mouth. Touching used tissues, or sharing utensils, drinking glasses, or a toothbrush with an infected person can expose you to flu viruses, too.  What are the symptoms of the flu?  Flu symptoms tend to come on quickly and may last a few days to a few weeks. They include:  · Fever usually higher than 100.4°F  (38°C) and chills  · Sore throat and headache  · Dry cough  · Runny nose  · Tiredness and weakness  · Muscle aches  Things that make the flu worse  For some people, the flu can be very serious. The risk for complications is greater for:  · Children younger than age 5  · Adults ages 65 and older  · People with a chronic illness such as diabetes or heart, kidney, or lung disease  · People who live in a nursing home or long-term care facility   How is the flu treated?  The flu usually gets better after 7 days or so. In some cases, your healthcare provider may prescribe an antiviral medicine. This may help you get well sooner. For the medicine to help, you need to take it as soon as possible (ideally within 48 hours) after your symptoms start. If you develop pneumonia or other serious illness, you may need to stay in the hospital.  Easing flu symptoms  · Drink lots of fluids such as water, juice, and  warm soup. A good rule is to drink enough so that you urinate your normal amount.  · Get plenty of rest.  · Ask your healthcare provider what to take for fever and pain.  · Call your provider if your fever is 100.4°F (38°C) or higher, or you become dizzy, lightheaded, or short of breath.  Taking steps to protect others  · Wash your hands often, especially after coughing or sneezing. Or clean your hands with an alcohol-based hand  containing at least 60% alcohol.  · Cough or sneeze into a tissue. Then throw the tissue away and wash your hands. If you dont have a tissue, cough and sneeze into the crook of your elbow.  · Stay home until at least 24 hours after you no longer have a fever or chills. Be sure the fever isnt being hidden by fever-reducing medicine.  · Dont share food, utensils, drinking glasses, or a toothbrush with others.  · Ask your healthcare provider if others in your household should get antiviral medicine to help them avoid infection.  How can the flu be prevented?  · One of the best ways to avoid the flu is to get a flu vaccine each year. Viruses that cause the flu change from year to year. For that reason, doctors recommend getting the flu vaccine each year, as soon as it's available in your area. The vaccine may be given as a shot or as a nasal spray. Your healthcare provider can tell you which vaccine is right for you. The nasal spray is not recommended for the 7354-7387 flu season. The CDC says this is because the nasal spray did not seem to protect against the flu over the last several flu seasons. In the past, it was meant for people ages 2 to 49.  · Wash your hands often. Frequent handwashing is a proven way to help prevent infection.  · Carry an alcohol-based hand gel containing at least 60% alcohol. Use it when you can't use soap and water. Then wash your hands as soon as you can.  · Avoid touching your eyes, nose, and mouth.  · At home and work, clean phones, computer keyboards,  and toys often with disinfectant wipes.  · If possible, avoid close contact with others who have the flu or symptoms of the flu.  Handwashing tips  Handwashing is one of the best ways to prevent many common infections. If you are caring for or visiting someone with the flu, wash your hands each time you enter and leave the room. Follow these steps:  · Use warm water and plenty of soap. Rub your hands together well.  · Clean the whole hand, under your nails, between your fingers, and up the wrists.  · Wash for at least 15 seconds.  · Rinse, letting the water run down your fingers, not up your wrists.  · Dry your hands well. Use a paper towel to turn off the faucet and open the door.  Using alcohol-based hand   Alcohol-based hand  are also a good choice. Use them when you can't use soap and water. Follow these steps:  · Squeeze about a tablespoon of gel into the palm of one hand.  · Rub your hands together briskly, cleaning the backs of your hands, the palms, between your fingers, and up the wrists.  · Rub until the gel is gone and your hands are completely dry.  Preventing influenza in healthcare settings  The flu is a special concern for people in hospitals and long-term care facilities. To help prevent the spread of flu, many hospitals and nursing homes take these steps:  · Healthcare providers wash their hands or use an alcohol-based hand  before and after treating each patient.  · People with the flu have private rooms and bathrooms or share a room with someone with the same infection.  · People at high-risk for the flu but don't have it are encouraged to get the flu and pneumonia vaccines.  · All healthcare workers are encouraged or required to get flu shots.   Date Last Reviewed: 8/27/2014  © 1383-9596 [employer]. [address] [place] [address], [place] [address]. All rights reserved. This information is not intended as a substitute for professional medical care. Always follow your  healthcare professional's instructions.        Influenza (Adult)    Influenza is also called the flu. It is a viral illness that affects the air passages of your lungs. It is different from the common cold. The flu can easily be passed from one to person to another. It may be spread through the air by coughing and sneezing. Or it can be spread by touching the sick person and then touching your own eyes, nose, or mouth.  The flu starts 1 to 3 days after you are exposed to the flu virus. It may last for 1 to 2 weeks. You usually dont need to take antibiotics unless you have a complication. This might be an ear or sinus infection or pneumonia.  Symptoms of the flu may be mild or severe. They can include extreme tiredness (wanting to stay in bed all day), chills, fevers, muscle aches, soreness with eye movement, headache, and a dry, hacking cough.  Home care  Follow these guidelines when caring for yourself at home:  · Avoid being around cigarette smoke, whether yours or other peoples.  · Acetaminophen or ibuprofen will help ease your fever, muscle aches, and headache. Dont give aspirin to anyone younger than 18 who has the flu. Aspirin can harm the liver.  · Nausea and loss of appetite are common with the flu. Eat light meals. Drink 6 to 8 glasses of liquids every day. Good choices are water, sport drinks, soft drinks without caffeine, juices, tea, and soup. Extra fluids will also help loosen secretions in your nose and lungs.  · Over-the-counter cold medicines will not make the flu go away faster. But the medicines may help with coughing, sore throat, and congestion in your nose and sinuses. Dont use a decongestant if you have high blood pressure.  · Stay home until your fever has been gone for at least 24 hours without using medicine to reduce fever.  Follow-up care  Follow up with your healthcare provider, or as advised, if you are not getting better over the next week.  If you are 65 or older, talk with your  provider about getting a pneumococcal vaccine every 5 years. You should also get this vaccine if you have chronic asthma or COPD. All adults should get a flu vaccine every fall. Ask your provider about this.  When to seek medical advice  Call your healthcare provider right away if any of these occur:  · Cough with lots of colored sputum (mucus) or blood in your sputum  · Chest pain, shortness of breath, wheezing, or difficulty breathing  · Severe headache, or face, neck, or ear pain  · New rash with fever  · Fever of 100.4°F (38°C) or higher, or as directed by your healthcare provider  · Confusion, behavior change, or seizure  · Severe weakness or dizziness  · You get a fever or cough after getting better for a few days  Date Last Reviewed: 12/23/2014  © 9652-0239 [employer]. [address] [place] [address], [place] [address]. All rights reserved. This information is not intended as a substitute for professional medical care. Always follow your healthcare professional's instructions.             Language Assistance Services     ATTENTION: Language assistance services are available, free of charge. Please call [phone].      ATENCIÓN: Si habla español, tiene a liriano disposición servicios gratuitos de asistencia lingüística. Llame al [phone].     JULIANA Ý: N?u b?n nói Ti?ng Vi?t, có các d?ch v? h? tr? ngôn ng? mi?n phí dành cho b?n. G?i s? [phone].         [facility] complies with applicable Federal civil rights laws and does not discriminate on the basis of race, color, national origin, age, disability, or sex.

## 2017-03-02 NOTE — PATIENT INSTRUCTIONS
Adult Self-Care for Colds  Colds are caused by viruses. They cant be cured with antibiotics. However, you can relieve symptoms and support your bodys efforts to heal itself. No matter which symptoms you have, be sure to drink plenty of fluids (water or clear soup); stop smoking and drinking alcohol; and get plenty of rest.   Understand a fever  · Take your temperature several times a day. If your fever is 100.4°F (38.0°C) for more than a day, call your doctor.  · Relax, lie down. Go to bed if you want. Just get off your feet and rest. Also, drink plenty of fluids to avoid dehydration.  · Take acetaminophen or a nonsteroidal anti-inflammatory agent (NSAID), such as ibuprofen.  Treat a troubled nose kindly  · Breathe steam or heated humidified air to open blocked nasal passages.  a hot shower or use a vaporizer. Be careful not to get burned by the steam.  · Saline nasal sprays and decongestant tablets help open a stuffy nose. Antihistamines can also help, but they can cause side effects such as drowsiness and drying of the eyes, nose, and mouth.  Soothe a sore throat and cough  · Gargle every 2 hours with 1/4 teaspoon of salt dissolved in 1/2 cup of warm water. Suck on throat lozenges and cough drops to moisten your throat.  · Cough medicines are available but it is unclear how effective they actually are.  · Take acetaminophen or an NSAID, such as ibuprofen to ease throat pain  Ease digestive problems  · Put fluid back into your body. Take frequent sips of clear liquids such as water or broth. Do not drink beverages with a lot of sugar in them, such as juices and sodas. These can make diarrhea worse. Older children and adults can drink sports drinks.  · As your appetite returns, you can resume your normal diet. Ask your doctor whether there are any foods you should avoid.     When to seek medical care  When you first notice symptoms, ask your health care provider if antiviral medications are  appropriate. Antibiotics should not be taken for colds or flu. Also, call your doctor if you have any of the following symptoms or if you arent feeling better after 7 days:  · Shortness of breath  · Pain or pressure in the chest or abdomen  · Worsening symptoms, especially after a period of improvement  · Fever of 100.4°F  (38.0°C) or higher, or fever that doesnt go down with medication  · Sudden dizziness or confusion  · Severe or continued vomiting  · Signs of dehydration, including extreme thirst, dark urine, infrequent urination, dry mouth  · Spotted, red, or very sore throat   Date Last Reviewed: 6/19/2014 © 2000-2016 [employer]. [address] [place] [address], [place] [address]. All rights reserved. This information is not intended as a substitute for professional medical care. Always follow your healthcare professional's instructions.        Understanding the Cold Virus  Colds are the most common illness that people get. Most adults get 2 or 3 colds per year, and most children get 5 to 7 colds per year. Colds may be caused by over 200 types of viruses. The most common of these are rhinoviruses (rhino refers to the nose).  What causes a cold virus?  All colds start with infection by a virus. You can be infected by more than one cold virus at a time. Infection with cold viruses happens when:  · You breathe in a virus from the air. This can happen when someone with a cold sneezes or coughs near you.  · You touch your eyes, nose, or mouth when your hand has a cold virus on it. This can happen if you touch an object that has the cold virus on it.  What are the symptoms of a cold virus?  Almost all colds involve a stuffy nose. Other common symptoms include:  · Runny nose  · Sneezing  · Sore throat  · Headache  · Cough  How is a cold treated?  Colds usually last 5 to 10 days. Treatment focuses on relieving symptoms. Treatments may include:  · Decongestant medicines. Several types of decongestants are  available without prescription. These may help reduce stuffy or runny nose symptoms.  · Prescription or over-the-counter nasal sprays. These may help reduce nasal symptoms, including stuffiness.  · Prescription or over-the-counter pain medicines. These can help with headaches and sore throat.  · Self-care. This includes extra rest, using humidifiers, and drinking more fluids. These help you feel better while you are getting over a cold.  Antibiotics are not helpful for a cold. They do not make a cold shorter or relieve symptoms. Taking antibiotics when you dont need them can make them work less well when you need them for another illness.  Follow all directions for using medicines, especially when giving them to children. Contact your healthcare provider if you have any questions about using cold medicines safely.  Can a cold be prevented?  You can help reduce the spread of cold viruses. This can help both you and others avoid getting colds. Follow these tips:  · Wash your hands well anytime you may have come into contact with cold viruses. Wash your hands for at least 20 seconds. When you cant wash with soap and water, use an alcohol-based hand .  · Dont touch your nose, eyes, or mouth, especially after touching something that may have a cold virus on it.  · Cover your mouth and nose when you cough or sneeze. Throw away tissues after using them.  · Disinfect things you touch often, such as phones and keyboards.    · Stay home when you have a cold.  What are the possible complications of a cold virus?  Colds usually go away by themselves. But its not unusual to get another type of infection while you have a cold. These can include:  · Sinus infection  · Lung infection, such as bronchitis or pneumonia  · Ear infection  If you have asthma or chronic bronchitis, a cold can make your condition worse.     When should I call my healthcare provider?  Call your healthcare provider right away if you have any of  these:  · Fever of 100.4°F (38°C) or higher, or as directed  · Cough, chest pain, or shortness of breath that gets worse  · Symptoms dont get better or get worse after about 10 days  · Headache, sleepiness, or confusion that gets worse   Date Last Reviewed: 3/28/2016  © 6871-5424 [employer]. [address] [place] [address], [place] [address]. All rights reserved. This information is not intended as a substitute for professional medical care. Always follow your healthcare professional's instructions.        Influenza     Viruses that cause influenza spread through the air in droplets when someone who has the flu coughs, sneezes, laughs, or talks.   Influenza (the flu) is an infection that affects your respiratory tract. This tract is made up of your mouth, nose, and lungs, and the passages between them. Unlike a cold, the flu can make you very ill. And it can lead to pneumonia, a serious lung infection. The flu can have serious complications and even be fatal for some people. These include older adults, young children, and people with certain chronic conditions.  Who is at risk for the flu?  Anyone can get the flu. But you are more likely to become infected if you:  · Have a weakened immune system  · Work in a healthcare setting where you may be exposed to flu germs  · Live or work with someone who has the flu  · Havent had an annual flu shot  How does the flu spread?  The flu is caused by viruses. The viruses spread through the air in droplets when someone who has the flu coughs, sneezes, laughs, or talks. You can become infected when you inhale these viruses directly. You can also become infected when you touch a surface on which the droplets have landed and then transfer the germs to your eyes, nose, or mouth. Touching used tissues, or sharing utensils, drinking glasses, or a toothbrush with an infected person can expose you to flu viruses, too.  What are the symptoms of the flu?  Flu symptoms tend to  come on quickly and may last a few days to a few weeks. They include:  · Fever usually higher than 100.4°F  (38°C) and chills  · Sore throat and headache  · Dry cough  · Runny nose  · Tiredness and weakness  · Muscle aches  Things that make the flu worse  For some people, the flu can be very serious. The risk for complications is greater for:  · Children younger than age 5  · Adults ages 65 and older  · People with a chronic illness such as diabetes or heart, kidney, or lung disease  · People who live in a nursing home or long-term care facility   How is the flu treated?  The flu usually gets better after 7 days or so. In some cases, your healthcare provider may prescribe an antiviral medicine. This may help you get well sooner. For the medicine to help, you need to take it as soon as possible (ideally within 48 hours) after your symptoms start. If you develop pneumonia or other serious illness, you may need to stay in the hospital.  Easing flu symptoms  · Drink lots of fluids such as water, juice, and warm soup. A good rule is to drink enough so that you urinate your normal amount.  · Get plenty of rest.  · Ask your healthcare provider what to take for fever and pain.  · Call your provider if your fever is 100.4°F (38°C) or higher, or you become dizzy, lightheaded, or short of breath.  Taking steps to protect others  · Wash your hands often, especially after coughing or sneezing. Or clean your hands with an alcohol-based hand  containing at least 60% alcohol.  · Cough or sneeze into a tissue. Then throw the tissue away and wash your hands. If you dont have a tissue, cough and sneeze into the crook of your elbow.  · Stay home until at least 24 hours after you no longer have a fever or chills. Be sure the fever isnt being hidden by fever-reducing medicine.  · Dont share food, utensils, drinking glasses, or a toothbrush with others.  · Ask your healthcare provider if others in your household should get  antiviral medicine to help them avoid infection.  How can the flu be prevented?  · One of the best ways to avoid the flu is to get a flu vaccine each year. Viruses that cause the flu change from year to year. For that reason, doctors recommend getting the flu vaccine each year, as soon as it's available in your area. The vaccine may be given as a shot or as a nasal spray. Your healthcare provider can tell you which vaccine is right for you. The nasal spray is not recommended for the 5109-6777 flu season. The CDC says this is because the nasal spray did not seem to protect against the flu over the last several flu seasons. In the past, it was meant for people ages 2 to 49.  · Wash your hands often. Frequent handwashing is a proven way to help prevent infection.  · Carry an alcohol-based hand gel containing at least 60% alcohol. Use it when you can't use soap and water. Then wash your hands as soon as you can.  · Avoid touching your eyes, nose, and mouth.  · At home and work, clean phones, computer keyboards, and toys often with disinfectant wipes.  · If possible, avoid close contact with others who have the flu or symptoms of the flu.  Handwashing tips  Handwashing is one of the best ways to prevent many common infections. If you are caring for or visiting someone with the flu, wash your hands each time you enter and leave the room. Follow these steps:  · Use warm water and plenty of soap. Rub your hands together well.  · Clean the whole hand, under your nails, between your fingers, and up the wrists.  · Wash for at least 15 seconds.  · Rinse, letting the water run down your fingers, not up your wrists.  · Dry your hands well. Use a paper towel to turn off the faucet and open the door.  Using alcohol-based hand   Alcohol-based hand  are also a good choice. Use them when you can't use soap and water. Follow these steps:  · Squeeze about a tablespoon of gel into the palm of one hand.  · Rub your hands  together briskly, cleaning the backs of your hands, the palms, between your fingers, and up the wrists.  · Rub until the gel is gone and your hands are completely dry.  Preventing influenza in healthcare settings  The flu is a special concern for people in hospitals and long-term care facilities. To help prevent the spread of flu, many hospitals and nursing homes take these steps:  · Healthcare providers wash their hands or use an alcohol-based hand  before and after treating each patient.  · People with the flu have private rooms and bathrooms or share a room with someone with the same infection.  · People at high-risk for the flu but don't have it are encouraged to get the flu and pneumonia vaccines.  · All healthcare workers are encouraged or required to get flu shots.   Date Last Reviewed: 8/27/2014  © 0561-8436 [employer]. [address] [place] [address]. All rights reserved. This information is not intended as a substitute for professional medical care. Always follow your healthcare professional's instructions.        Influenza (Adult)    Influenza is also called the flu. It is a viral illness that affects the air passages of your lungs. It is different from the common cold. The flu can easily be passed from one to person to another. It may be spread through the air by coughing and sneezing. Or it can be spread by touching the sick person and then touching your own eyes, nose, or mouth.  The flu starts 1 to 3 days after you are exposed to the flu virus. It may last for 1 to 2 weeks. You usually dont need to take antibiotics unless you have a complication. This might be an ear or sinus infection or pneumonia.  Symptoms of the flu may be mild or severe. They can include extreme tiredness (wanting to stay in bed all day), chills, fevers, muscle aches, soreness with eye movement, headache, and a dry, hacking cough.  Home care  Follow these guidelines when caring for yourself at  home:  · Avoid being around cigarette smoke, whether yours or other peoples.  · Acetaminophen or ibuprofen will help ease your fever, muscle aches, and headache. Dont give aspirin to anyone younger than 18 who has the flu. Aspirin can harm the liver.  · Nausea and loss of appetite are common with the flu. Eat light meals. Drink 6 to 8 glasses of liquids every day. Good choices are water, sport drinks, soft drinks without caffeine, juices, tea, and soup. Extra fluids will also help loosen secretions in your nose and lungs.  · Over-the-counter cold medicines will not make the flu go away faster. But the medicines may help with coughing, sore throat, and congestion in your nose and sinuses. Dont use a decongestant if you have high blood pressure.  · Stay home until your fever has been gone for at least 24 hours without using medicine to reduce fever.  Follow-up care  Follow up with your healthcare provider, or as advised, if you are not getting better over the next week.  If you are 65 or older, talk with your provider about getting a pneumococcal vaccine every 5 years. You should also get this vaccine if you have chronic asthma or COPD. All adults should get a flu vaccine every fall. Ask your provider about this.  When to seek medical advice  Call your healthcare provider right away if any of these occur:  · Cough with lots of colored sputum (mucus) or blood in your sputum  · Chest pain, shortness of breath, wheezing, or difficulty breathing  · Severe headache, or face, neck, or ear pain  · New rash with fever  · Fever of 100.4°F (38°C) or higher, or as directed by your healthcare provider  · Confusion, behavior change, or seizure  · Severe weakness or dizziness  · You get a fever or cough after getting better for a few days  Date Last Reviewed: 12/23/2014  © 6091-9228 [employer]. [address] [place] [address], [place] [address]. All rights reserved. This information is not intended as a substitute for  professional medical care. Always follow your healthcare professional's instructions.

## 2017-03-02 NOTE — PROGRESS NOTES
"  Subjective:      Patient ID: Leesa Le is a 57 y.o. female.    Chief Complaint: URI; Sore Throat; and Generalized Body Aches    URI    This is a new problem. The current episode started yesterday. The problem has been gradually worsening. The maximum temperature recorded prior to her arrival was 101 - 101.9 F. The fever has been present for 1 to 2 days. Associated symptoms include congestion, coughing, headaches, joint pain and a sore throat. Pertinent negatives include no abdominal pain, chest pain, diarrhea, dysuria, ear pain, joint swelling, nausea, neck pain, plugged ear sensation, rash, rhinorrhea, sinus pain, sneezing, swollen glands, vomiting or wheezing. She has tried NSAIDs (nyquil) for the symptoms. The treatment provided no relief.   + exposure to sick people at work    Review of Systems   Constitutional: Positive for activity change, appetite change, chills, diaphoresis, fatigue and fever. Negative for unexpected weight change.   HENT: Positive for congestion and sore throat. Negative for ear pain, rhinorrhea and sneezing.    Respiratory: Positive for cough and chest tightness. Negative for shortness of breath and wheezing.    Cardiovascular: Negative for chest pain, palpitations and leg swelling.   Gastrointestinal: Negative for abdominal distention, abdominal pain, diarrhea, nausea and vomiting.   Genitourinary: Negative for dysuria.   Musculoskeletal: Positive for arthralgias, joint pain and myalgias. Negative for neck pain.   Skin: Negative for rash.   Neurological: Positive for headaches.     Objective:   BP (!) 160/84  Pulse (!) 112  Temp (!) 101.6 °F (38.7 °C) (Tympanic)   Ht 5' 1" (1.549 m)  Wt 55.6 kg (122 lb 9.2 oz)  SpO2 99%  BMI 23.16 kg/m2    Physical Exam   Constitutional: She is oriented to person, place, and time. She appears well-developed and well-nourished.   HENT:   Head: Normocephalic and atraumatic.   Right Ear: Hearing, tympanic membrane, external ear and ear canal " normal. No tenderness.   Left Ear: Hearing, tympanic membrane, external ear and ear canal normal. No tenderness.   Nose: Mucosal edema and rhinorrhea present. No sinus tenderness. Right sinus exhibits maxillary sinus tenderness and frontal sinus tenderness. Left sinus exhibits maxillary sinus tenderness and frontal sinus tenderness.   Mouth/Throat: Uvula is midline and mucous membranes are normal. No oral lesions. Normal dentition. No dental abscesses, uvula swelling or dental caries. Oropharyngeal exudate and posterior oropharyngeal erythema present. No posterior oropharyngeal edema or tonsillar abscesses. No tonsillar exudate.   Eyes: Conjunctivae and EOM are normal. Pupils are equal, round, and reactive to light. Right eye exhibits no discharge. Left eye exhibits no discharge.   Neck: Normal range of motion. Neck supple.   Cardiovascular: Regular rhythm and normal heart sounds.  Tachycardia present.  Exam reveals no gallop and no friction rub.    No murmur heard.  Pulmonary/Chest: Effort normal and breath sounds normal. No respiratory distress. She has no wheezes. She has no rales.   Lymphadenopathy:     She has no cervical adenopathy.   Neurological: She is alert and oriented to person, place, and time.   Skin: Skin is warm. No rash noted. No erythema. No pallor.   Psychiatric: She has a normal mood and affect. Her behavior is normal. Judgment and thought content normal.   Nursing note and vitals reviewed.      Assessment:     1. Coryza      Plan:   Coryza  -     Influenza antigen Nasopharyngeal Swab  -     Throat Screen, Rapid    -dayquil/nyquil. Fever control.   -Educational handout on over-the-counter medications and at-home conservative care, pertinent to the patients diagnosis today, was handed to the patient and discussed in detail.    Return if symptoms worsen or fail to improve.

## 2017-03-04 LAB — BACTERIA THROAT CULT: NORMAL

## 2017-04-24 ENCOUNTER — PATIENT OUTREACH (OUTPATIENT)
Dept: ADMINISTRATIVE | Facility: HOSPITAL | Age: 57
End: 2017-04-24

## 2017-05-08 ENCOUNTER — OFFICE VISIT (OUTPATIENT)
Dept: INTERNAL MEDICINE | Facility: CLINIC | Age: 57
End: 2017-05-08
Payer: COMMERCIAL

## 2017-05-08 VITALS
DIASTOLIC BLOOD PRESSURE: 84 MMHG | SYSTOLIC BLOOD PRESSURE: 130 MMHG | HEIGHT: 61 IN | TEMPERATURE: 98 F | BODY MASS INDEX: 23.11 KG/M2 | WEIGHT: 122.38 LBS | HEART RATE: 84 BPM | OXYGEN SATURATION: 98 %

## 2017-05-08 DIAGNOSIS — Z00.00 ROUTINE GENERAL MEDICAL EXAMINATION AT A HEALTH CARE FACILITY: Primary | ICD-10-CM

## 2017-05-08 DIAGNOSIS — Z79.4 TYPE 2 DIABETES MELLITUS WITHOUT COMPLICATION, WITH LONG-TERM CURRENT USE OF INSULIN: ICD-10-CM

## 2017-05-08 DIAGNOSIS — E04.2 GOITER, NONTOXIC, MULTINODULAR: ICD-10-CM

## 2017-05-08 DIAGNOSIS — F33.9 MAJOR DEPRESSION, RECURRENT, CHRONIC: ICD-10-CM

## 2017-05-08 DIAGNOSIS — Z78.9 DRUG INTOLERANCE: ICD-10-CM

## 2017-05-08 DIAGNOSIS — Z12.11 COLON CANCER SCREENING: ICD-10-CM

## 2017-05-08 DIAGNOSIS — E78.2 HYPERLIPIDEMIA, MIXED: ICD-10-CM

## 2017-05-08 DIAGNOSIS — Z12.39 BREAST SCREENING: ICD-10-CM

## 2017-05-08 DIAGNOSIS — E11.9 TYPE 2 DIABETES MELLITUS WITHOUT COMPLICATION, WITH LONG-TERM CURRENT USE OF INSULIN: ICD-10-CM

## 2017-05-08 DIAGNOSIS — I10 ESSENTIAL HYPERTENSION: ICD-10-CM

## 2017-05-08 PROBLEM — R07.89 ATYPICAL CHEST PAIN: Status: RESOLVED | Noted: 2017-01-09 | Resolved: 2017-05-08

## 2017-05-08 PROCEDURE — 99396 PREV VISIT EST AGE 40-64: CPT | Mod: S$GLB,,, | Performed by: FAMILY MEDICINE

## 2017-05-08 PROCEDURE — 3075F SYST BP GE 130 - 139MM HG: CPT | Mod: S$GLB,,, | Performed by: FAMILY MEDICINE

## 2017-05-08 PROCEDURE — 99999 PR PBB SHADOW E&M-EST. PATIENT-LVL III: CPT | Mod: PBBFAC,,, | Performed by: FAMILY MEDICINE

## 2017-05-08 PROCEDURE — 3079F DIAST BP 80-89 MM HG: CPT | Mod: S$GLB,,, | Performed by: FAMILY MEDICINE

## 2017-05-08 RX ORDER — SODIUM, POTASSIUM,MAG SULFATES 17.5-3.13G
SOLUTION, RECONSTITUTED, ORAL ORAL
Qty: 1 BOTTLE | Refills: 0 | Status: ON HOLD | OUTPATIENT
Start: 2017-05-08 | End: 2017-06-23 | Stop reason: CLARIF

## 2017-05-08 NOTE — PROGRESS NOTES
Subjective:       Patient ID: Leesa Le is a 57 y.o. female.    Chief Complaint: Follow-up    HPI Comments: 57-year-old Afro-American female patient with Patient Active Problem List:     Type 2 diabetes mellitus without complication     Goiter, nontoxic, multinodular     Drug intolerance     Hyperlipidemia, mixed     Abnormal ECG     DELCID (dyspnea on exertion)  Here for routine annual physicals and for follow-up on chronic medical conditions.  Patient reports that her blood glucose levels has been remaining imaging in the range of 180s to 200s.  Patient has been having minimal polyuria and polyphagia, but denies of any polydipsia.  Patient has tried Crestor and Lipitor but could not tolerate in the past and has not been taking any list on medication for hyperlipidemia.  Denies of any worsening anxiety or depression taking Lexapro 10 mg daily.  Patient denies of any tingling or numbness sensation to extremities, denies of any trouble with swallowing secondary to goiter.  Denies of any extreme shortness of breath or chest pain, had complete cardiac workup which was normal.  Patient reports that she's currently taking Lantus 24 units in the evening and NovoLog Flex pen 8-10 units with sliding scale  Has not been exercising lately      Review of Systems   Constitutional: Negative for fatigue.   HENT: Negative for trouble swallowing.    Eyes: Negative for visual disturbance.   Respiratory: Negative for shortness of breath.    Cardiovascular: Negative for chest pain and leg swelling.   Gastrointestinal: Negative for abdominal pain, nausea and vomiting.   Endocrine: Positive for polyphagia and polyuria. Negative for polydipsia.   Musculoskeletal: Negative for myalgias.   Skin: Negative for rash.   Neurological: Negative for weakness, light-headedness, numbness and headaches.   Psychiatric/Behavioral: Negative for sleep disturbance.         /84 (BP Location: Left arm, Patient Position: Sitting)  Pulse 84  Temp  "97.5 °F (36.4 °C) (Tympanic)   Ht 5' 1" (1.549 m)  Wt 55.5 kg (122 lb 5.7 oz)  SpO2 98%  BMI 23.12 kg/m2  Objective:      Physical Exam   Constitutional: She is oriented to person, place, and time. She appears well-developed and well-nourished.   HENT:   Head: Normocephalic and atraumatic.   Mouth/Throat: Oropharynx is clear and moist.   Neck: No thyromegaly present.   Cardiovascular: Normal rate, regular rhythm and normal heart sounds.    No murmur heard.  Pulses:       Dorsalis pedis pulses are 2+ on the right side, and 2+ on the left side.   Pulmonary/Chest: Effort normal and breath sounds normal. She has no wheezes.   Abdominal: Soft. Bowel sounds are normal. There is no tenderness.   Musculoskeletal: She exhibits no edema or tenderness.   Feet:   Right Foot:   Protective Sensation: 10 sites tested. 10 sites sensed.   Skin Integrity: Negative for callus or dry skin.   Left Foot:   Protective Sensation: 10 sites tested. 10 sites sensed.   Skin Integrity: Negative for callus or dry skin.   Neurological: She is alert and oriented to person, place, and time.   Skin: Skin is warm and dry. No rash noted.   Psychiatric: She has a normal mood and affect.         Assessment:       1. Routine general medical examination at a health care facility    2. Type 2 diabetes mellitus without complication, with long-term current use of insulin    3. Goiter, nontoxic, multinodular    4. Hyperlipidemia, mixed    5. Drug intolerance    6. Breast screening    7. Colon cancer screening    8. Essential hypertension    9. Major depression, recurrent, chronic        Plan:   Routine general medical examination at a health care facility  -     CBC auto differential; Future; Expected date: 5/8/17  -     Comprehensive metabolic panel; Future; Expected date: 5/8/17  -     Lipid panel; Future; Expected date: 5/8/17  -     TSH; Future; Expected date: 5/8/17  -     Urinalysis; Future; Expected date: 5/8/17  Vital signs stable today.  Clinical " exam normal.  Encouraged to maintain lifestyle modifications with low-fat and low-cholesterol diet and exercise 30 minutes daily  Will schedule mammogram    Type 2 diabetes mellitus without complication, with long-term current use of insulin  -     Comprehensive metabolic panel; Future; Expected date: 5/8/17  -     Lipid panel; Future; Expected date: 5/8/17  -     Hemoglobin A1c; Future; Expected date: 5/8/17  -     Microalbumin/creatinine urine ratio; Future; Expected date: 5/8/17  -     Comprehensive metabolic panel; Future; Expected date: 9/5/17  -     Lipid panel; Future; Expected date: 9/5/17  -     Hemoglobin A1c; Future; Expected date: 9/5/17  Patient has been running elevated blood glucose levels in the range of 180s to 200s, advised to increase Lantus from 24- 28 units in the evening, continue NovoLog 8-10 units with sliding scale with each meal  Strict lifestyle changes recommended with 1800 ADA low-fat and low-cholesterol diet and exercise 30 minutes daily.  Diabetic foot exam stable today.  Patient has been followed by Dr. Henderson, encouraged to make eye appointment for follow-up yearly.        Goiter, nontoxic, multinodular  -     TSH; Future; Expected date: 5/8/17  -     US Soft Tissue Head Neck Thyroid; Future; Expected date: 5/8/17  -     TSH; Future; Expected date: 9/5/17  Stable and asymptomatic  Will plan to repeat thyroid ultrasound for surveillance      Hyperlipidemia, mixed  -     Lipid panel; Future; Expected date: 5/8/17  -     Lipid panel; Future; Expected date: 9/5/17  With statin drug intolerance, has tried Lipitor and Crestor in the past.  Will check fasting lipid profile    Drug intolerance-to statins    Breast screening  -     Mammo Digital Screening Bilat with CAD; Future; Expected date: 5/8/17  Due for mammogram    Colon cancer screening  -     Case request GI: COLONOSCOPY  -     sodium,potassium,mag sulfates (SUPREP BOWEL PREP KIT) 17.5-3.13-1.6 gram SolR; Take it as directed   Dispense: 1 Bottle; Refill: 0  Due for colonoscopy    Essential hypertension  -     Comprehensive metabolic panel; Future; Expected date: 9/5/17  -     Lipid panel; Future; Expected date: 9/5/17  Blood pressure stable today currently on lisinopril 5 mg, patient was advised to call us back if needed any further refills    Depression has been stable on Lexapro 10 mg daily

## 2017-05-08 NOTE — MR AVS SNAPSHOT
[facility] Internal Medicine  [address] [address]  Phone: [phone]  Fax: [phone]                  Leesa Le   2017 9:00 AM   Office Visit    Description:  Female : 1960   Provider:  Rosangela Cat MD   Department:  [facility] - Internal Medicine           Reason for Visit     Follow-up           Diagnoses this Visit        Comments    Routine general medical examination at a health care facility    -  Primary     Type 2 diabetes mellitus without complication, with long-term current use of insulin         Goiter, nontoxic, multinodular         Hyperlipidemia, mixed         Drug intolerance         Breast screening         Colon cancer screening         Essential hypertension                To Do List           Future Appointments        Provider Department Dept Phone    2017 7:55 AM LAB[facility]ATORY, [facility] [phone]    2017 8:30 AM SPECIMEN, [facility] [phone]    5/15/2017 12:45 PM [facility] [id number] [facility] [phone]    5/15/2017 1:15 PM [facility] US1 [facility] [phone]    2017 7:40 AM LAB[facility]ATORY, [facility] [phone]      Your Future Surgeries/Procedures     2017   Surgery with Buzz Snyder MD   [facility])    [address] [facility] [address] [address] [phone]              Goals (5 Years of Data)     None      Follow-Up and Disposition     Return in about 4 months (around 2017), or if symptoms worsen or fail to improve.       These Medications        Disp Refills Start End    sodium,potassium,mag sulfates (SUPREP BOWEL PREP KIT) 17.5-3.13-1.6 gram SolR 1 Bottle 0 2017     Take it as directed    Pharmacy: [facility] [address] [place] [address] Ph #: [phone]         [facility]     [facility]  "Care Line - 24/7 Assistance  Unless otherwise directed by your provider, please contact [facility] On[facility]Call, our nurse care line that is available for 24/7 assistance.     Registered nurses in the [facility] provide: appointment scheduling, clinical advisement, health education, and other advisory services.  Call: [phone] (toll free)               Medications           Message regarding Medications     Verify the changes and/or additions to your medication regime listed below are the same as discussed with your clinician today.  If any of these changes or additions are incorrect, please notify your healthcare provider.        START taking these NEW medications        Refills    sodium,potassium,mag sulfates (SUPREP BOWEL PREP KIT) 17.5-3.13-1.6 gram SolR 0    Sig: Take it as directed    Class: Normal      STOP taking these medications     glyBURIDE-metformin 5-500 mg (GLUCOVANCE) 5-500 mg Tab TAKE ONE TABLET BY MOUTH TWICE DAILY WITH MEALS           Verify that the below list of medications is an accurate representation of the medications you are currently taking.  If none reported, the list may be blank. If incorrect, please contact your healthcare provider. Carry this list with you in case of emergency.           Current Medications     BD INSULIN PEN NEEDLE UF MINI 31 x 3/16 " Ndle USE ONCE DAILY    CONTOUR NEXT STRIPS Strp TEST BLOOD SUGAR FOUR TIMES DAILY    escitalopram oxalate (LEXAPRO) 10 MG tablet Take 1 tablet (10 mg total) by mouth once daily.    insulin glargine (LANTUS SOLOSTAR) 100 unit/mL (3 mL) InPn pen Inject 35 Units into the skin every evening.    insulin needles, disposable, (BD INSULIN PEN NEEDLE UF MINI) 31 x 3/16 " Ndle To take insulin once daily    LANCETS MISC by Misc.(Non-Drug; Combo Route) route 4 (four) times daily.     lisinopril (PRINIVIL,ZESTRIL) 5 MG tablet Take 1 tablet (5 mg total) by mouth once daily.    NOVOLOG FLEXPEN 100 unit/mL InPn pen Inject 8-10 Units into the " "skin 3 (three) times daily with meals. As per SSI    sodium,potassium,mag sulfates (SUPREP BOWEL PREP KIT) 17.5-3.13-1.6 gram SolR Take it as directed           Clinical Reference Information           Your Vitals Were     BP Pulse Temp Height Weight SpO2    130/84 (BP Location: Left arm, Patient Position: Sitting) 84 97.5 °F (36.4 °C) (Tympanic) 5' 1" (1.549 m) 55.5 kg (122 lb 5.7 oz) 98%    BMI                23.12 kg/m2          Blood Pressure          Most Recent Value    BP  130/84      Allergies as of 5/8/2017     Pcn [Penicillins]    Statins-hmg-coa Reductase Inhibitors      Immunizations Administered on Date of Encounter - 5/8/2017     None      Orders Placed During Today's Visit      Normal Orders This Visit    Case request GI: COLONOSCOPY     Future Labs/Procedures Expected by Expires    CBC auto differential  5/8/2017 7/7/2018    Comprehensive metabolic panel  5/8/2017 7/7/2018    Hemoglobin A1c  5/8/2017 7/7/2018    Lipid panel  5/8/2017 7/7/2018    Mammo Digital Screening Bilat with CAD  5/8/2017 7/8/2018    Microalbumin/creatinine urine ratio  5/8/2017 7/7/2018    TSH  5/8/2017 7/7/2018    Urinalysis  5/8/2017 7/7/2018    US Soft Tissue Head Neck Thyroid  5/8/2017 5/8/2018    Comprehensive metabolic panel  9/5/2017 7/7/2018    Hemoglobin A1c  9/5/2017 7/7/2018    Lipid panel  9/5/2017 7/7/2018    TSH  9/5/2017 7/7/2018      Instructions    Increase lantus from 24 to 28 units daily        Language Assistance Services     ATTENTION: Language assistance services are available, free of charge. Please call [phone].      ATENCIÓN: Si habla español, tiene a liriano disposición servicios gratuitos de asistencia lingüística. Llame al [phone].     CHÚ Ý: N?u b?n nói Ti?ng Vi?t, có các d?ch v? h? tr? ngôn ng? mi?n phí dành cho b?n. G?i s? [phone].         [facility] complies with applicable Federal civil rights laws and does not discriminate on the basis of race, color, national " origin, age, disability, or sex.

## 2017-05-12 ENCOUNTER — TELEPHONE (OUTPATIENT)
Dept: RADIOLOGY | Facility: HOSPITAL | Age: 57
End: 2017-05-12

## 2017-05-12 ENCOUNTER — LAB VISIT (OUTPATIENT)
Dept: LAB | Facility: HOSPITAL | Age: 57
End: 2017-05-12
Attending: FAMILY MEDICINE
Payer: COMMERCIAL

## 2017-05-12 DIAGNOSIS — E04.2 GOITER, NONTOXIC, MULTINODULAR: ICD-10-CM

## 2017-05-12 DIAGNOSIS — Z00.00 ROUTINE GENERAL MEDICAL EXAMINATION AT A HEALTH CARE FACILITY: ICD-10-CM

## 2017-05-12 DIAGNOSIS — E11.9 TYPE 2 DIABETES MELLITUS WITHOUT COMPLICATION, WITH LONG-TERM CURRENT USE OF INSULIN: ICD-10-CM

## 2017-05-12 DIAGNOSIS — Z79.4 TYPE 2 DIABETES MELLITUS WITHOUT COMPLICATION, WITH LONG-TERM CURRENT USE OF INSULIN: ICD-10-CM

## 2017-05-12 DIAGNOSIS — E78.2 HYPERLIPIDEMIA, MIXED: ICD-10-CM

## 2017-05-12 LAB
ALBUMIN SERPL BCP-MCNC: 3.5 G/DL
ALP SERPL-CCNC: 89 U/L
ALT SERPL W/O P-5'-P-CCNC: 30 U/L
ANION GAP SERPL CALC-SCNC: 10 MMOL/L
AST SERPL-CCNC: 26 U/L
BASOPHILS # BLD AUTO: 0.07 K/UL
BASOPHILS NFR BLD: 1.1 %
BILIRUB SERPL-MCNC: 0.5 MG/DL
BUN SERPL-MCNC: 14 MG/DL
CALCIUM SERPL-MCNC: 9.4 MG/DL
CHLORIDE SERPL-SCNC: 101 MMOL/L
CHOLEST/HDLC SERPL: 3.8 {RATIO}
CO2 SERPL-SCNC: 25 MMOL/L
CREAT SERPL-MCNC: 0.9 MG/DL
DIFFERENTIAL METHOD: ABNORMAL
EOSINOPHIL # BLD AUTO: 0.3 K/UL
EOSINOPHIL NFR BLD: 5 %
ERYTHROCYTE [DISTWIDTH] IN BLOOD BY AUTOMATED COUNT: 14.4 %
EST. GFR  (AFRICAN AMERICAN): >60 ML/MIN/1.73 M^2
EST. GFR  (NON AFRICAN AMERICAN): >60 ML/MIN/1.73 M^2
GLUCOSE SERPL-MCNC: 254 MG/DL
HCT VFR BLD AUTO: 34.4 %
HDL/CHOLESTEROL RATIO: 26.3 %
HDLC SERPL-MCNC: 281 MG/DL
HDLC SERPL-MCNC: 74 MG/DL
HGB BLD-MCNC: 11.5 G/DL
LDLC SERPL CALC-MCNC: 173 MG/DL
LYMPHOCYTES # BLD AUTO: 2.3 K/UL
LYMPHOCYTES NFR BLD: 36.9 %
MCH RBC QN AUTO: 25.1 PG
MCHC RBC AUTO-ENTMCNC: 33.4 %
MCV RBC AUTO: 75 FL
MONOCYTES # BLD AUTO: 0.5 K/UL
MONOCYTES NFR BLD: 8.5 %
NEUTROPHILS # BLD AUTO: 3 K/UL
NEUTROPHILS NFR BLD: 48.3 %
NONHDLC SERPL-MCNC: 207 MG/DL
PLATELET # BLD AUTO: 353 K/UL
PMV BLD AUTO: 10.5 FL
POTASSIUM SERPL-SCNC: 4.4 MMOL/L
PROT SERPL-MCNC: 7.6 G/DL
RBC # BLD AUTO: 4.58 M/UL
SODIUM SERPL-SCNC: 136 MMOL/L
TRIGL SERPL-MCNC: 170 MG/DL
TSH SERPL DL<=0.005 MIU/L-ACNC: 1.11 UIU/ML
WBC # BLD AUTO: 6.15 K/UL

## 2017-05-12 PROCEDURE — 80053 COMPREHEN METABOLIC PANEL: CPT

## 2017-05-12 PROCEDURE — 80061 LIPID PANEL: CPT

## 2017-05-12 PROCEDURE — 83036 HEMOGLOBIN GLYCOSYLATED A1C: CPT

## 2017-05-12 PROCEDURE — 84443 ASSAY THYROID STIM HORMONE: CPT

## 2017-05-12 PROCEDURE — 85025 COMPLETE CBC W/AUTO DIFF WBC: CPT

## 2017-05-12 PROCEDURE — 36415 COLL VENOUS BLD VENIPUNCTURE: CPT | Mod: PO

## 2017-05-13 LAB
ESTIMATED AVG GLUCOSE: 235 MG/DL
HBA1C MFR BLD HPLC: 9.8 %

## 2017-05-15 ENCOUNTER — HOSPITAL ENCOUNTER (OUTPATIENT)
Dept: RADIOLOGY | Facility: HOSPITAL | Age: 57
Discharge: HOME | End: 2017-05-15
Attending: FAMILY MEDICINE
Payer: COMMERCIAL

## 2017-05-15 ENCOUNTER — TELEPHONE (OUTPATIENT)
Dept: INTERNAL MEDICINE | Facility: CLINIC | Age: 57
End: 2017-05-15

## 2017-05-15 DIAGNOSIS — E04.2 GOITER, NONTOXIC, MULTINODULAR: ICD-10-CM

## 2017-05-15 DIAGNOSIS — Z12.39 BREAST SCREENING: ICD-10-CM

## 2017-05-15 DIAGNOSIS — E78.2 MIXED HYPERLIPIDEMIA: ICD-10-CM

## 2017-05-15 PROCEDURE — 76536 US EXAM OF HEAD AND NECK: CPT | Mod: TC,PO

## 2017-05-15 PROCEDURE — 77067 SCR MAMMO BI INCL CAD: CPT | Mod: 26,,, | Performed by: RADIOLOGY

## 2017-05-15 PROCEDURE — 76536 US EXAM OF HEAD AND NECK: CPT | Mod: 26,,, | Performed by: RADIOLOGY

## 2017-05-15 PROCEDURE — 77067 SCR MAMMO BI INCL CAD: CPT | Mod: TC

## 2017-05-15 PROCEDURE — 77063 BREAST TOMOSYNTHESIS BI: CPT | Mod: 26,,, | Performed by: RADIOLOGY

## 2017-05-15 RX ORDER — PRAVASTATIN SODIUM 20 MG/1
20 TABLET ORAL DAILY
Qty: 90 TABLET | Refills: 3 | Status: SHIPPED | OUTPATIENT
Start: 2017-05-15 | End: 2017-09-15 | Stop reason: DRUGHIGH

## 2017-05-15 NOTE — TELEPHONE ENCOUNTER
Noted extremely elevated blood glucose levels and A1c, please advise patient to increase Lantus from 35-40 units daily, and follow-up with diabetes clinic, referral has been placed today.  Extremely elevated cholesterol levels, please advise patient to call us back and see, which statins she has tried in the past, for which she was not tolerable.  We can try different statins to lower her cholesterol levels  Mild anemia noted, encouraged to eat iron and protein rich diet

## 2017-05-15 NOTE — TELEPHONE ENCOUNTER
Pt states that she is willing to take another other prescription than the one that was prescribed.

## 2017-05-16 ENCOUNTER — TELEPHONE (OUTPATIENT)
Dept: INTERNAL MEDICINE | Facility: CLINIC | Age: 57
End: 2017-05-16

## 2017-05-16 DIAGNOSIS — E04.2 MULTIPLE THYROID NODULES: Primary | ICD-10-CM

## 2017-05-16 NOTE — TELEPHONE ENCOUNTER
Will refer to ENT for multiple thyroid nodules especially in the right side showing minimal increase in size.

## 2017-05-17 ENCOUNTER — OFFICE VISIT (OUTPATIENT)
Dept: OTOLARYNGOLOGY | Facility: CLINIC | Age: 57
End: 2017-05-17
Payer: COMMERCIAL

## 2017-05-17 VITALS
DIASTOLIC BLOOD PRESSURE: 86 MMHG | HEART RATE: 89 BPM | HEIGHT: 61 IN | TEMPERATURE: 99 F | RESPIRATION RATE: 18 BRPM | SYSTOLIC BLOOD PRESSURE: 141 MMHG | BODY MASS INDEX: 23.39 KG/M2 | WEIGHT: 123.88 LBS

## 2017-05-17 DIAGNOSIS — E04.2 MULTIPLE THYROID NODULES: Primary | ICD-10-CM

## 2017-05-17 PROCEDURE — 3077F SYST BP >= 140 MM HG: CPT | Mod: S$GLB,,, | Performed by: ORTHOPAEDIC SURGERY

## 2017-05-17 PROCEDURE — 99214 OFFICE O/P EST MOD 30 MIN: CPT | Mod: S$GLB,,, | Performed by: ORTHOPAEDIC SURGERY

## 2017-05-17 PROCEDURE — 1160F RVW MEDS BY RX/DR IN RCRD: CPT | Mod: S$GLB,,, | Performed by: ORTHOPAEDIC SURGERY

## 2017-05-17 PROCEDURE — 99999 PR PBB SHADOW E&M-EST. PATIENT-LVL IV: CPT | Mod: PBBFAC,,, | Performed by: ORTHOPAEDIC SURGERY

## 2017-05-17 PROCEDURE — 3079F DIAST BP 80-89 MM HG: CPT | Mod: S$GLB,,, | Performed by: ORTHOPAEDIC SURGERY

## 2017-05-17 NOTE — PROGRESS NOTES
Subjective:       Patient ID: Leesa Le is a 57 y.o. female.    Chief Complaint: Thyroid Problem and Cough (off and on x2-3 months)    HPI Comments: Patient is a very pleasant 57 year old female here to see me today in followup for evaluation of her thyroid. She has a history of a left hemithyroidectomy about 20 years ago.  She is unsure of her diagnosis at that time, but she thinks that it was overactive.  She is not sure if she had any nodules at that time, but says that there was no cancer in her final specimen.  Since that time she has been having ultrasounds of her thyroid.  She does have a family history of thyroid issues, and her grandmother had some sort of thyroid diagnosis (she is not sure which one).  She has some difficulty swallowing liquids, no problem with solids.  She has had some intermittent hoarseness.  She is not currently on any thyroid supplementation.  She has not had any recent weight gain or weight loss.    Review of Systems   Constitutional: Positive for fatigue. Negative for chills, fever and unexpected weight change.   HENT: Positive for trouble swallowing and voice change. Negative for congestion, dental problem, ear discharge, ear pain, facial swelling, hearing loss, nosebleeds, postnasal drip, rhinorrhea, sinus pressure, sneezing, sore throat and tinnitus.    Eyes: Negative for redness, itching and visual disturbance.   Respiratory: Positive for cough. Negative for choking, shortness of breath and wheezing.    Cardiovascular: Negative for chest pain and palpitations.   Gastrointestinal: Negative for abdominal pain.        No reflux.   Musculoskeletal: Negative for gait problem.   Skin: Negative for rash.   Neurological: Positive for headaches. Negative for dizziness and light-headedness.       Objective:      Physical Exam   Constitutional: She is oriented to person, place, and time. She appears well-developed and well-nourished. No distress.   HENT:   Head: Normocephalic and  atraumatic.   Right Ear: Tympanic membrane, external ear and ear canal normal.   Left Ear: Tympanic membrane, external ear and ear canal normal.   Nose: Nose normal. No mucosal edema, rhinorrhea, nasal deformity or septal deviation. No epistaxis. Right sinus exhibits no maxillary sinus tenderness and no frontal sinus tenderness. Left sinus exhibits no maxillary sinus tenderness and no frontal sinus tenderness.   Mouth/Throat: Uvula is midline, oropharynx is clear and moist and mucous membranes are normal. Mucous membranes are not pale and not dry. No dental caries. No oropharyngeal exudate or posterior oropharyngeal erythema.   Eyes: Conjunctivae, EOM and lids are normal. Pupils are equal, round, and reactive to light. Right eye exhibits no chemosis. Left eye exhibits no chemosis. Right conjunctiva is not injected. Left conjunctiva is not injected. No scleral icterus. Right eye exhibits normal extraocular motion and no nystagmus. Left eye exhibits normal extraocular motion and no nystagmus.   Neck: Trachea normal and phonation normal. No tracheal tenderness present. No tracheal deviation present. Thyroid mass (right thyroid lobe prominent, no left lobe palpated) present.   Horizontal neck incision from previous thyroidectomy   Cardiovascular: Intact distal pulses.    Pulmonary/Chest: Effort normal. No stridor. No respiratory distress.   Abdominal: She exhibits no distension.   Lymphadenopathy:        Head (right side): No submental, no submandibular, no preauricular, no posterior auricular and no occipital adenopathy present.        Head (left side): No submental, no submandibular, no preauricular, no posterior auricular and no occipital adenopathy present.     She has no cervical adenopathy.   Neurological: She is alert and oriented to person, place, and time. No cranial nerve deficit.   Skin: Skin is warm and dry. No rash noted. No erythema.   Psychiatric: She has a normal mood and affect. Her behavior is normal.       Previous FNA from 2015 reviewed:  FINAL PATHOLOGIC DIAGNOSIS  1. Right upper thyroid, FNA:  [facility] Thyroid Cytology Category: Benign  2. Right mid thyroid, FNA:  [facility] Thyroid Cytology Category: Benign  3. Right lower thyroid, FNA:  [facility] Thyroid Cytology Category: Benign      THYROID US:  1.  Status post partial LEFT thyroidectomy with nonvisualization of previously described hypoechoic nodule noted on prior exam.    2.  Stable, prominent RIGHT lobe with multiple nodules as described above.  Followup and/or further evaluation as warranted.       Assessment:       1. Multiple thyroid nodules        Plan:       1.  Multiple thyroid nodules: Overall size of nodules is stable, other than slight growth in inferior pole nodule.  Patient's prevous US guided FNA was reviewed and discussed in detail.  The biopsy of her nodule revealed follicuar cells.  We discussed that the overall accuracy of FNA for thyroid nodules exceeds 95%, though distinguishing between follicular adenoma and follicular carcinoma is more difficult.  Different management strategies were discussed, including surgical removal of all or part of the thyroid gland as well as observation.  Thus far, we have been observing and overall her nodules are stable.  She patient expressed understanding of these options, and would like to proceed with further observation. Return to clinic in one year with repeat US.

## 2017-05-17 NOTE — MR AVS SNAPSHOT
[facility]  ENT  [address] [address]  Phone: [phone]  Fax: [phone]                  Leesa Le   2017 10:00 AM   Office Visit    Description:  Female : 1960   Provider:  Erin Antonio MD   Department:  [facility] - ENT           Reason for Visit     Thyroid Problem     Cough           Diagnoses this Visit        Comments    Multiple thyroid nodules    -  Primary            To Do List           Future Appointments        Provider Department Dept Phone    2017 11:00 AM Fatmata Lozano NP [facility] Diabetes Management [phone]    2017 7:40 AM LABORATORY, [facility] [phone]    9/15/2017 12:40 PM Rosangela Cat MD [facility] Internal Medicine [phone]    2018 9:00 AM [facility] US2 [facility] [phone]    2018 9:00 AM Erin Antonio MD [facility] [phone]      Your Future Surgeries/Procedures     2017   Surgery with Buzz Snyder MD   [facility])    [id number] [place] [address] [phone]              Goals (5 Years of Data)     None      [facility]     [facility] Care Line -  Assistance  Unless otherwise directed by your provider, please contact [facility], our nurse care line that is available for  assistance.     Registered nurses in the [facility] provide: appointment scheduling, clinical advisement, health education, and other advisory services.  Call: [phone] (toll free)               Medications           Message regarding Medications     Verify the changes and/or additions to your medication regime listed below are the same as discussed with your clinician today.  If any of these changes or additions are incorrect, please notify your healthcare provider.             Verify that the below list of medications is an accurate representation of the  "medications you are currently taking.  If none reported, the list may be blank. If incorrect, please contact your healthcare provider. Carry this list with you in case of emergency.           Current Medications     BD INSULIN PEN NEEDLE UF MINI 31 x 3/16 " Ndle USE ONCE DAILY    CONTOUR NEXT STRIPS Strp TEST BLOOD SUGAR FOUR TIMES DAILY    escitalopram oxalate (LEXAPRO) 10 MG tablet Take 1 tablet (10 mg total) by mouth once daily.    insulin glargine (LANTUS SOLOSTAR) 100 unit/mL (3 mL) InPn pen Inject 35 Units into the skin every evening.    insulin needles, disposable, (BD INSULIN PEN NEEDLE UF MINI) 31 x 3/16 " Ndle To take insulin once daily    LANCETS MISC by Misc.(Non-Drug; Combo Route) route 4 (four) times daily.     lisinopril (PRINIVIL,ZESTRIL) 5 MG tablet Take 1 tablet (5 mg total) by mouth once daily.    NOVOLOG FLEXPEN 100 unit/mL InPn pen Inject 8-10 Units into the skin 3 (three) times daily with meals. As per SSI    pravastatin (PRAVACHOL) 20 MG tablet Take 1 tablet (20 mg total) by mouth once daily.    sodium,potassium,mag sulfates (SUPREP BOWEL PREP KIT) 17.5-3.13-1.6 gram SolR Take it as directed           Clinical Reference Information           Your Vitals Were     BP Pulse Temp Resp Height Weight    141/86 89 98.6 °F (37 °C) (Tympanic) 18 5' 1" (1.549 m) 56.2 kg (123 lb 14.4 oz)    BMI                23.41 kg/m2          Blood Pressure          Most Recent Value    BP  (!)  141/86      Allergies as of 5/17/2017     Pcn [Penicillins]    Statins-hmg-coa Reductase Inhibitors      Immunizations Administered on Date of Encounter - 5/17/2017     None      Orders Placed During Today's Visit     Future Labs/Procedures Expected by Expires    US Soft Tissue Head Neck Thyroid  5/17/2017 5/17/2018      Language Assistance Services     ATTENTION: Language assistance services are available, free of charge. Please call [phone].      ATENCIÓN: Si adonisla sean, tiene a liriano disposición servicios gratuitos " de asistencia lingüística. [place] carmona [phone].     JULIANA Ý: N?u b?n nói Ti?ng Vi?t, có các d?ch v? h? tr? ngôn ng? mi?n phí dành cho b?n. G?i s? [phone].         [facility] complies with applicable Federal civil rights laws and does not discriminate on the basis of race, color, national origin, age, disability, or sex.

## 2017-05-17 NOTE — LETTER
May 17, 2017      Rosangela Cat MD  [address] [place] [address]           [facility]  [address] [place] [address]  Phone: [phone]  Fax: [phone]          Patient: Leesa Le   MR Number: [id number]   YOB: 1960   Date of Visit: 5/17/2017       Dear Dr. Rosangela Cat:    Thank you for referring Leesa Le to me for evaluation. Attached you will find relevant portions of my assessment and plan of care.    If you have questions, please do not hesitate to call me. I look forward to following Leesa Le along with you.    Sincerely,    Erin Antonio MD    Enclosure  CC:  No Recipients    If you would like to receive this communication electronically, please contact [email or web address] or [phone] to request more information on [employer] Link access.    For providers and/or their staff who would like to refer a patient to [facility], please contact us through our one-stop-shop provider referral line, [facility], at [phone].    If you feel you have received this communication in error or would no longer like to receive these types of communications, please e-mail [email or web address]

## 2017-06-22 ENCOUNTER — NURSE TRIAGE (OUTPATIENT)
Dept: ADMINISTRATIVE | Facility: CLINIC | Age: 57
End: 2017-06-22

## 2017-06-23 ENCOUNTER — SURGERY (OUTPATIENT)
Age: 57
End: 2017-06-23

## 2017-06-23 ENCOUNTER — ANESTHESIA EVENT (OUTPATIENT)
Dept: ENDOSCOPY | Facility: HOSPITAL | Age: 57
End: 2017-06-23
Payer: COMMERCIAL

## 2017-06-23 ENCOUNTER — ANESTHESIA (OUTPATIENT)
Dept: ENDOSCOPY | Facility: HOSPITAL | Age: 57
End: 2017-06-23
Payer: COMMERCIAL

## 2017-06-23 ENCOUNTER — HOSPITAL ENCOUNTER (OUTPATIENT)
Facility: HOSPITAL | Age: 57
Discharge: HOME | End: 2017-06-23
Attending: INTERNAL MEDICINE | Admitting: INTERNAL MEDICINE
Payer: COMMERCIAL

## 2017-06-23 VITALS
DIASTOLIC BLOOD PRESSURE: 74 MMHG | HEIGHT: 61 IN | OXYGEN SATURATION: 100 % | HEART RATE: 69 BPM | SYSTOLIC BLOOD PRESSURE: 119 MMHG | TEMPERATURE: 98 F | BODY MASS INDEX: 23.03 KG/M2 | RESPIRATION RATE: 18 BRPM | WEIGHT: 122 LBS

## 2017-06-23 DIAGNOSIS — Z12.11 SCREENING FOR COLON CANCER: ICD-10-CM

## 2017-06-23 LAB — POCT GLUCOSE: 216 MG/DL (ref 70–110)

## 2017-06-23 PROCEDURE — 82962 GLUCOSE BLOOD TEST: CPT | Performed by: INTERNAL MEDICINE

## 2017-06-23 PROCEDURE — 37000008 HC ANESTHESIA 1ST 15 MINUTES: Performed by: INTERNAL MEDICINE

## 2017-06-23 PROCEDURE — G0121 COLON CA SCRN NOT HI RSK IND: HCPCS | Performed by: INTERNAL MEDICINE

## 2017-06-23 PROCEDURE — 25000003 PHARM REV CODE 250: Performed by: NURSE ANESTHETIST, CERTIFIED REGISTERED

## 2017-06-23 PROCEDURE — 37000009 HC ANESTHESIA EA ADD 15 MINS: Performed by: INTERNAL MEDICINE

## 2017-06-23 PROCEDURE — G0121 COLON CA SCRN NOT HI RSK IND: HCPCS | Mod: ,,, | Performed by: INTERNAL MEDICINE

## 2017-06-23 PROCEDURE — 63600175 PHARM REV CODE 636 W HCPCS: Performed by: NURSE ANESTHETIST, CERTIFIED REGISTERED

## 2017-06-23 RX ORDER — PROPOFOL 10 MG/ML
INJECTION, EMULSION INTRAVENOUS
Status: DISCONTINUED | OUTPATIENT
Start: 2017-06-23 | End: 2017-06-23

## 2017-06-23 RX ORDER — SODIUM CHLORIDE, SODIUM LACTATE, POTASSIUM CHLORIDE, CALCIUM CHLORIDE 600; 310; 30; 20 MG/100ML; MG/100ML; MG/100ML; MG/100ML
INJECTION, SOLUTION INTRAVENOUS CONTINUOUS
Status: DISCONTINUED | OUTPATIENT
Start: 2017-06-23 | End: 2017-06-23 | Stop reason: HOSPADM

## 2017-06-23 RX ORDER — SODIUM CHLORIDE, SODIUM LACTATE, POTASSIUM CHLORIDE, CALCIUM CHLORIDE 600; 310; 30; 20 MG/100ML; MG/100ML; MG/100ML; MG/100ML
INJECTION, SOLUTION INTRAVENOUS CONTINUOUS PRN
Status: DISCONTINUED | OUTPATIENT
Start: 2017-06-23 | End: 2017-06-23

## 2017-06-23 RX ORDER — LIDOCAINE HCL/PF 100 MG/5ML
SYRINGE (ML) INTRAVENOUS
Status: DISCONTINUED | OUTPATIENT
Start: 2017-06-23 | End: 2017-06-23

## 2017-06-23 RX ADMIN — PROPOFOL 30 MG: 10 INJECTION, EMULSION INTRAVENOUS at 11:06

## 2017-06-23 RX ADMIN — PROPOFOL 70 MG: 10 INJECTION, EMULSION INTRAVENOUS at 11:06

## 2017-06-23 RX ADMIN — SODIUM CHLORIDE, SODIUM LACTATE, POTASSIUM CHLORIDE, AND CALCIUM CHLORIDE: 600; 310; 30; 20 INJECTION, SOLUTION INTRAVENOUS at 11:06

## 2017-06-23 RX ADMIN — LIDOCAINE HYDROCHLORIDE 50 MG: 20 INJECTION, SOLUTION INTRAVENOUS at 11:06

## 2017-06-23 NOTE — H&P
Short Stay Endoscopy History and Physical    PCP - Rosangela Cat MD    Procedure - Colonoscopy  ASA - 2  Mallampati - per anesthesia  History of Anesthesia problems - no  Family history Anesthesia problems -  no     HPI:  This is a 57 y.o. female here for evaluation of :     Average Risk Screening: yes  High risk screening: No  History of polyps: No  Anemia: No  Blood in stools: No  Diarrhea: No  Abdominal Pain: No    Review of Systems:  CONSTITUTIONAL: Denies weight change,  fatigue, fevers, chills, night sweats.  CARDIOVASCULAR: Denies chest pain, shortness of breath, orthopnea and edema.  RESPIRATORY: Denies cough, hemoptysis, dyspnea, and wheezing.  GI: See HPI.    Medical History:  Past Medical History:   Diagnosis Date    Abnormal Pap smear of vagina     Arthritis     Left shoulder    Diabetes mellitus type II     Dr Ayala manages DM     Hyperlipidemia     Hypertension     Thyroid disease     Dr Melissa garcia- go[facility]+       Surgical History:   Past Surgical History:   Procedure Laterality Date    APPENDECTOMY      CARDIAC CATHETERIZATION      MOUTH FLOOR MASS EXCISION Bilateral     Lower jaw bones , Bilateral    THYROIDECTOMY, PARTIAL      TUBAL LIGATION      TUMOR EXCISION      mouth       Family History:   Family History   Problem Relation Age of Onset    Ulcers Mother     Alzheimer's disease Father     Cancer Maternal Aunt      bone, lung    Diabetes Maternal Grandmother     Diabetes Paternal Grandmother     Colon polyps Maternal Uncle        Social History:   Social History   Substance Use Topics    Smoking status: Former Smoker     Packs/day: 1.00     Years: 8.00     Types: Cigarettes     Quit date: 1/9/2007    Smokeless tobacco: Never Used    Alcohol use 12.6 oz/week     21 Glasses of wine per week       Allergies: Reviewed.    Medications:  No current facility-administered medications on file prior to encounter.      Current Outpatient Prescriptions on File Prior to Encounter  "  Medication Sig Dispense Refill    escitalopram oxalate (LEXAPRO) 10 MG tablet Take 1 tablet (10 mg total) by mouth once daily. 90 tablet 1    insulin glargine (LANTUS SOLOSTAR) 100 unit/mL (3 mL) InPn pen Inject 35 Units into the skin every evening. (Patient taking differently: Inject 24 Units into the skin every evening. ) 15 mL 1    lisinopril (PRINIVIL,ZESTRIL) 5 MG tablet Take 1 tablet (5 mg total) by mouth once daily. 90 tablet 3    NOVOLOG FLEXPEN 100 unit/mL InPn pen Inject 8-10 Units into the skin 3 (three) times daily with meals. As per SSI      BD INSULIN PEN NEEDLE UF MINI 31 x 3/16 " Ndle USE ONCE DAILY 100 each 0    CONTOUR NEXT STRIPS Strp TEST BLOOD SUGAR FOUR TIMES DAILY 100 strip 0    insulin needles, disposable, (BD INSULIN PEN NEEDLE UF MINI) 31 x 3/16 " Ndle To take insulin once daily 50 each 3    LANCETS MISC by Misc.(Non-Drug; Combo Route) route 4 (four) times daily.       [DISCONTINUED] sodium,potassium,mag sulfates (SUPREP BOWEL PREP KIT) 17.5-3.13-1.6 gram SolR Take it as directed 1 Bottle 0       Physical Exam:  Vital Signs:   Vitals:    06/23/17 1038   BP: (!) 161/85   Pulse: 81   Resp: 20   Temp: 98.2 °F (36.8 °C)     General Appearance: Well appearing in no acute distress  ENT: OP clear  Chest: CTA B  CV: RRR, no m/r/g  Abd: s/nt/nd/nabs  Ext: no edema    Labs:  Reviewed    Impression: Screening    Plan:  I have explained the risks and benefits of colonoscopy to the patient including but not limited to bleeding, perforation, infection, and death. The patient wishes to proceed with colonoscopy.    "

## 2017-06-23 NOTE — TRANSFER OF CARE
"Anesthesia Transfer of Care Note    Patient: Leesa Le    Procedure(s) Performed: Procedure(s) (LRB):  COLONOSCOPY (N/A)    Patient location: PACU    Anesthesia Type: MAC    Transport from OR: Transported from OR on room air with adequate spontaneous ventilation    Post pain: adequate analgesia    Post assessment: no apparent anesthetic complications    Post vital signs: stable    Level of consciousness: responds to stimulation    Nausea/Vomiting: no nausea/vomiting    Complications: none    Transfer of care protocol was followed      Last vitals:   Visit Vitals  BP (!) 161/85 (BP Location: Left arm, Patient Position: Lying, BP Method: Automatic)   Pulse 81   Temp 36.8 °C (98.2 °F) (Oral)   Resp 20   Ht 5' 1" (1.549 m)   Wt 55.3 kg (122 lb)   LMP 06/23/2015   SpO2 99%   Breastfeeding? No   BMI 23.05 kg/m²     "

## 2017-06-23 NOTE — ANESTHESIA PREPROCEDURE EVALUATION
06/23/2017  Leesa Le is a 57 y.o., female.    Anesthesia Evaluation    I have reviewed the Patient Summary Reports.    I have reviewed the Nursing Notes.   I have reviewed the Medications.     Review of Systems  Anesthesia Hx:  No problems with previous Anesthesia  Denies Family Hx of Anesthesia complications.   Denies Personal Hx of Anesthesia complications.   Social:  Non-Smoker    Hematology/Oncology:  Hematology Normal   Oncology Normal     EENT/Dental:EENT/Dental Normal   Cardiovascular:   Exercise tolerance: good Hypertension, well controlled DELCID    Pulmonary:   Shortness of breath    Renal/:  Renal/ Normal     Hepatic/GI:  Hepatic/GI Normal    Musculoskeletal:  Musculoskeletal Normal    Neurological:  Neurology Normal    Endocrine:   Diabetes, well controlled, type 2, using insulin    Dermatological:  Skin Normal    Psych:   Psychiatric History          Physical Exam  General:  Well nourished    Airway/Jaw/Neck:  Airway Findings: Mouth Opening: Normal Tongue: Normal  General Airway Assessment: Adult, Average  Mallampati: II  TM Distance: Normal, at least 6 cm       Chest/Lungs:  Chest/Lungs Findings: Clear to auscultation, Normal Respiratory Rate     Heart/Vascular:  Heart Findings: Rate: Normal  Rhythm: Regular Rhythm  Sounds: Normal        Mental Status:  Mental Status Findings:  Cooperative, Alert and Oriented         Anesthesia Plan  Type of Anesthesia, risks & benefits discussed:  Anesthesia Type:  MAC  Patient's Preference:   Intra-op Monitoring Plan:   Intra-op Monitoring Plan Comments:   Post Op Pain Control Plan:   Post Op Pain Control Plan Comments:   Induction:   IV  Beta Blocker:  Patient is not currently on a Beta-Blocker (No further documentation required).       Informed Consent: Patient understands risks and agrees with Anesthesia plan.  Questions answered. Anesthesia consent  signed with patient.  ASA Score: 2     Day of Surgery Review of History & Physical: I have interviewed and examined the patient. I have reviewed the patient's H&P dated: 6/23/17. There are no significant changes.  H&P update referred to the surgeon.         Ready For Surgery From Anesthesia Perspective.

## 2017-06-23 NOTE — ANESTHESIA POSTPROCEDURE EVALUATION
"Anesthesia Post Evaluation    Patient: Leesa Le    Procedure(s) Performed: Procedure(s) (LRB):  COLONOSCOPY (N/A)    Final Anesthesia Type: MAC  Patient location during evaluation: PACU  Patient participation: Yes- Able to Participate  Level of consciousness: awake, awake and alert and oriented  Post-procedure vital signs: reviewed and stable  Pain management: adequate  Airway patency: patent  PONV status at discharge: No PONV  Anesthetic complications: no      Cardiovascular status: blood pressure returned to baseline  Respiratory status: spontaneous ventilation, unassisted and room air  Hydration status: euvolemic  Follow-up not needed.        Visit Vitals  BP (!) 161/85 (BP Location: Left arm, Patient Position: Lying, BP Method: Automatic)   Pulse 81   Temp 36.8 °C (98.2 °F) (Oral)   Resp 20   Ht 5' 1" (1.549 m)   Wt 55.3 kg (122 lb)   LMP 06/23/2015   SpO2 99%   Breastfeeding? No   BMI 23.05 kg/m²       Pain/Tru Score: Pain Assessment Performed: Yes (6/23/2017 11:33 AM)  Tru Score: 10 (6/23/2017 11:33 AM)      "

## 2017-06-23 NOTE — ANESTHESIA RELEASE NOTE
"Anesthesia Release from PACU Note    Patient: Leesa Le    Procedure(s) Performed: Procedure(s) (LRB):  COLONOSCOPY (N/A)    Anesthesia type: MAC    Post pain: Adequate analgesia    Post assessment: no apparent anesthetic complications, tolerated procedure well and no evidence of recall    Last Vitals:   Visit Vitals  BP (!) 161/85 (BP Location: Left arm, Patient Position: Lying, BP Method: Automatic)   Pulse 81   Temp 36.8 °C (98.2 °F) (Oral)   Resp 20   Ht 5' 1" (1.549 m)   Wt 55.3 kg (122 lb)   LMP 06/23/2015   SpO2 99%   Breastfeeding? No   BMI 23.05 kg/m²       Post vital signs: stable    Level of consciousness: responds to stimulation    Nausea/Vomiting: no nausea/no vomiting    Complications: none    Airway Patency: patent    Respiratory: unassisted    Cardiovascular: stable and blood pressure at baseline    Hydration: euvolemic  "

## 2017-06-23 NOTE — DISCHARGE INSTRUCTIONS
Hemorrhoids    Hemorrhoids are swollen and inflamed veins inside the rectum and near the anus. The rectum is the last several inches of the colon. The anus is the passage between the rectum and the outside of the body.  Causes  The veins can become swollen due to increased pressure in them. This is most often caused by:  · Chronic constipation or diarrhea  · Straining when having a bowel movement  · Sitting too long on the toilet  · A low-fiber diet  · Pregnancy  Symptoms  · Bleeding from the rectum (this may be noticeable after bowel movements)  · Lump near the anus  · Itching around the anus  · Pain around the anus  There are different types of hemorrhoids. Depending on the type you have and the severity, you may be able to treat yourself at home. In some cases, a procedure may be the best treatment option. Your healthcare provider can tell you more about this, if needed.  Home care  General care  · To get relief from pain or itching, try:  ¨ Topical products. Your healthcare provider may prescribe or recommend creams, ointments, or pads that can be applied to the hemorrhoid. Use these exactly as directed.  ¨ Medicines. Your healthcare provider may recommend stool softeners, suppositories, or laxatives to help manage constipation. Use these exactly as directed.  ¨ Sitz baths. A sitz bath involves sitting in a few inches of warm bath water. Be careful not to make the water so hot that you burn yourself--test it before sitting in it. Soak for about 10 to 15 minutes a few times a day. This may help relieve pain.  Tips to help prevent hemorrhoids  · Eat more fiber. Fiber adds bulk to stool and absorbs water as it moves through your colon. This makes stool softer and easier to pass.  ¨ Increase the fiber in your diet with more fiber-rich foods. These include fresh fruit, vegetables, and whole grains.  ¨ Take a fiber supplement or bulking agent, if advised to by your provider. These include products such as psyllium  or methylcellulose.  · Drink plenty of water, if directed to by your provider. This can help keep stool soft.  · Be more active. Frequent exercise aids digestion and helps prevent constipation. It may also help make bowel movements more regular.  · Dont strain during bowel movements. This can make hemorrhoids more likely. Also, dont sit on the toilet for long periods of time.  Follow-up care  Follow up with your healthcare provider, or as advised. If a culture or imaging tests were done, you will be notified of the results when they are ready. This may take a few days or longer.  When to seek medical advice  Call your healthcare provider right away if any of these occur:  · Increased bleeding from the rectum  · Increased pain around the rectum or anus  · Weakness or dizziness  Call 911  Call 911 or return to the emergency department right away if any of these occur:  · Trouble breathing or swallowing  · Fainting or loss of consciousness  · Unusually fast heart rate  · Vomiting blood  · Large amounts of blood in stool  Date Last Reviewed: 6/22/2015 © 2000-2016 [employer] [place]. [address] [place] [address], [place] [address]. All rights reserved. This information is not intended as a substitute for professional medical care. Always follow your healthcare professional's instructions.

## 2017-06-27 ENCOUNTER — PATIENT MESSAGE (OUTPATIENT)
Dept: RESEARCH | Facility: HOSPITAL | Age: 57
End: 2017-06-27

## 2017-07-27 DIAGNOSIS — F41.8 ANXIETY ASSOCIATED WITH DEPRESSION: ICD-10-CM

## 2017-07-27 RX ORDER — ESCITALOPRAM OXALATE 10 MG/1
10 TABLET ORAL DAILY
Qty: 90 TABLET | Refills: 1 | Status: SHIPPED | OUTPATIENT
Start: 2017-07-27 | End: 2017-09-15 | Stop reason: DRUGHIGH

## 2017-09-01 ENCOUNTER — PATIENT OUTREACH (OUTPATIENT)
Dept: ADMINISTRATIVE | Facility: HOSPITAL | Age: 57
End: 2017-09-01

## 2017-09-08 ENCOUNTER — LAB VISIT (OUTPATIENT)
Dept: LAB | Facility: HOSPITAL | Age: 57
End: 2017-09-08
Attending: FAMILY MEDICINE
Payer: COMMERCIAL

## 2017-09-08 DIAGNOSIS — E11.9 TYPE 2 DIABETES MELLITUS WITHOUT COMPLICATION, WITH LONG-TERM CURRENT USE OF INSULIN: ICD-10-CM

## 2017-09-08 DIAGNOSIS — I10 ESSENTIAL HYPERTENSION: ICD-10-CM

## 2017-09-08 DIAGNOSIS — Z79.4 TYPE 2 DIABETES MELLITUS WITHOUT COMPLICATION, WITH LONG-TERM CURRENT USE OF INSULIN: ICD-10-CM

## 2017-09-08 DIAGNOSIS — E78.2 HYPERLIPIDEMIA, MIXED: ICD-10-CM

## 2017-09-08 DIAGNOSIS — E04.2 GOITER, NONTOXIC, MULTINODULAR: ICD-10-CM

## 2017-09-08 LAB
ALBUMIN SERPL BCP-MCNC: 3.8 G/DL
ALP SERPL-CCNC: 100 U/L
ALT SERPL W/O P-5'-P-CCNC: 30 U/L
ANION GAP SERPL CALC-SCNC: 15 MMOL/L
AST SERPL-CCNC: 17 U/L
BILIRUB SERPL-MCNC: 0.5 MG/DL
BUN SERPL-MCNC: 16 MG/DL
CALCIUM SERPL-MCNC: 9.7 MG/DL
CHLORIDE SERPL-SCNC: 99 MMOL/L
CHOLEST SERPL-MCNC: 332 MG/DL
CHOLEST/HDLC SERPL: 5.3 {RATIO}
CO2 SERPL-SCNC: 25 MMOL/L
CREAT SERPL-MCNC: 1 MG/DL
EST. GFR  (AFRICAN AMERICAN): >60 ML/MIN/1.73 M^2
EST. GFR  (NON AFRICAN AMERICAN): >60 ML/MIN/1.73 M^2
GLUCOSE SERPL-MCNC: 359 MG/DL
HDLC SERPL-MCNC: 63 MG/DL
HDLC SERPL: 19 %
LDLC SERPL CALC-MCNC: 222.6 MG/DL
NONHDLC SERPL-MCNC: 269 MG/DL
POTASSIUM SERPL-SCNC: 4.1 MMOL/L
PROT SERPL-MCNC: 8.3 G/DL
SODIUM SERPL-SCNC: 139 MMOL/L
TRIGL SERPL-MCNC: 232 MG/DL
TSH SERPL DL<=0.005 MIU/L-ACNC: 1.48 UIU/ML

## 2017-09-08 PROCEDURE — 83036 HEMOGLOBIN GLYCOSYLATED A1C: CPT

## 2017-09-08 PROCEDURE — 80061 LIPID PANEL: CPT

## 2017-09-08 PROCEDURE — 80053 COMPREHEN METABOLIC PANEL: CPT

## 2017-09-08 PROCEDURE — 84443 ASSAY THYROID STIM HORMONE: CPT

## 2017-09-08 PROCEDURE — 36415 COLL VENOUS BLD VENIPUNCTURE: CPT | Mod: PO

## 2017-09-09 LAB
ESTIMATED AVG GLUCOSE: 275 MG/DL
HBA1C MFR BLD HPLC: 11.2 %

## 2017-09-10 ENCOUNTER — TELEPHONE (OUTPATIENT)
Dept: INTERNAL MEDICINE | Facility: CLINIC | Age: 57
End: 2017-09-10

## 2017-09-10 DIAGNOSIS — Z79.4 TYPE 2 DIABETES MELLITUS WITH HYPERGLYCEMIA, WITH LONG-TERM CURRENT USE OF INSULIN: Primary | ICD-10-CM

## 2017-09-10 DIAGNOSIS — E11.65 TYPE 2 DIABETES MELLITUS WITH HYPERGLYCEMIA, WITH LONG-TERM CURRENT USE OF INSULIN: Primary | ICD-10-CM

## 2017-09-10 NOTE — TELEPHONE ENCOUNTER
Extremely elevated blood glucose and A1c , cholesterol levels, will refer to diabetes clinic .   Will discuss further rx plans in up coming visit . Patient should increase lantus from 24 to 28 units daily . Check to see whether patient taking pravastatin 20 mg daily for cholesterol .

## 2017-09-15 ENCOUNTER — OFFICE VISIT (OUTPATIENT)
Dept: INTERNAL MEDICINE | Facility: CLINIC | Age: 57
End: 2017-09-15
Payer: COMMERCIAL

## 2017-09-15 VITALS
DIASTOLIC BLOOD PRESSURE: 60 MMHG | HEIGHT: 61 IN | TEMPERATURE: 98 F | BODY MASS INDEX: 23.9 KG/M2 | SYSTOLIC BLOOD PRESSURE: 112 MMHG | WEIGHT: 126.56 LBS

## 2017-09-15 DIAGNOSIS — F33.9 MAJOR DEPRESSION, RECURRENT, CHRONIC: ICD-10-CM

## 2017-09-15 DIAGNOSIS — I10 ESSENTIAL HYPERTENSION: ICD-10-CM

## 2017-09-15 DIAGNOSIS — E04.2 GOITER, NONTOXIC, MULTINODULAR: ICD-10-CM

## 2017-09-15 DIAGNOSIS — F10.10 ALCOHOL ABUSE: ICD-10-CM

## 2017-09-15 DIAGNOSIS — E78.2 HYPERLIPIDEMIA, MIXED: ICD-10-CM

## 2017-09-15 PROBLEM — Z12.11 SCREENING FOR COLON CANCER: Status: RESOLVED | Noted: 2017-06-23 | Resolved: 2017-09-15

## 2017-09-15 PROCEDURE — 4010F ACE/ARB THERAPY RXD/TAKEN: CPT | Mod: S$GLB,,, | Performed by: FAMILY MEDICINE

## 2017-09-15 PROCEDURE — 3074F SYST BP LT 130 MM HG: CPT | Mod: S$GLB,,, | Performed by: FAMILY MEDICINE

## 2017-09-15 PROCEDURE — 3078F DIAST BP <80 MM HG: CPT | Mod: S$GLB,,, | Performed by: FAMILY MEDICINE

## 2017-09-15 PROCEDURE — 3046F HEMOGLOBIN A1C LEVEL >9.0%: CPT | Mod: S$GLB,,, | Performed by: FAMILY MEDICINE

## 2017-09-15 PROCEDURE — 99999 PR PBB SHADOW E&M-EST. PATIENT-LVL III: CPT | Mod: PBBFAC,,, | Performed by: FAMILY MEDICINE

## 2017-09-15 PROCEDURE — 3008F BODY MASS INDEX DOCD: CPT | Mod: S$GLB,,, | Performed by: FAMILY MEDICINE

## 2017-09-15 PROCEDURE — 99214 OFFICE O/P EST MOD 30 MIN: CPT | Mod: S$GLB,,, | Performed by: FAMILY MEDICINE

## 2017-09-15 RX ORDER — PRAVASTATIN SODIUM 40 MG/1
40 TABLET ORAL DAILY
Qty: 90 TABLET | Refills: 3 | Status: SHIPPED | OUTPATIENT
Start: 2017-09-15 | End: 2018-01-19 | Stop reason: SINTOL

## 2017-09-15 RX ORDER — ESCITALOPRAM OXALATE 20 MG/1
20 TABLET ORAL DAILY
Qty: 30 TABLET | Refills: 11 | Status: SHIPPED | OUTPATIENT
Start: 2017-09-15 | End: 2017-12-19

## 2017-09-15 NOTE — PROGRESS NOTES
Subjective:       Patient ID: Leesa Le is a 57 y.o. female.    Chief Complaint: Follow-up    57-year-old -American female patient with Patient Active Problem List:     Type 2 diabetes mellitus without complication     Goiter, nontoxic, multinodular     Hyperlipidemia, mixed     Abnormal ECG     DELCID (dyspnea on exertion)     Major depression, recurrent, chronic     Essential hypertension  Here for follow-up on recent labs.  Patient reports that she was not taking pravastatin 20 mg regularly, has been missing her NovoLog insulin at bedtime, reports that she's been taking Lantus 34 units daily.   Patient denies of any shortness of breath, palpitations.   Reports fatigue, polyuria and polydipsia but denies of any polyphagia.   Denies of any chest pain or shortness of breath, tingling or numbness sensation to extremities.  Has appointment scheduled with diabetes clinic in 2 weeks  Reports that blood glucose levels has been running in the range of 250s to 300s.   Continues to have anxiety and depression and do not have any interest in doing anything        Diabetes   She has type 2 diabetes mellitus. No MedicAlert identification noted. The initial diagnosis of diabetes was made 12 years ago. Hypoglycemia symptoms include hunger, mood changes, nervousness/anxiousness, sleepiness and sweats. Pertinent negatives for hypoglycemia include no confusion, dizziness, pallor, seizures, speech difficulty or tremors. Associated symptoms include fatigue, polydipsia, polyuria, weakness and weight loss. Pertinent negatives for diabetes include no blurred vision, no chest pain, no foot paresthesias, no foot ulcerations, no polyphagia and no visual change. Pertinent negatives for hypoglycemia complications include no blackouts, no hospitalization, no nocturnal hypoglycemia, no required assistance and no required glucagon injection. Symptoms are worsening. Pertinent negatives for diabetic complications include no autonomic  "neuropathy, CVA, heart disease, impotence, nephropathy, peripheral neuropathy, PVD or retinopathy. Risk factors for coronary artery disease include dyslipidemia. Current diabetic treatment includes insulin injections and oral agent (dual therapy). She is compliant with treatment some of the time. She is currently taking insulin pre-breakfast, pre-lunch, pre-dinner and at bedtime. Insulin injections are given by patient. Rotation sites for injection include the abdominal wall. Her weight is stable. She is following a generally unhealthy diet. When asked about meal planning, she reported none. She has not had a previous visit with a dietitian. She rarely participates in exercise. She monitors blood glucose at home 3-4 x per week. She monitors urine at home <1 x per month. Blood glucose monitoring compliance is inadequate. Her home blood glucose trend is fluctuating minimally. She sees a podiatrist.Eye exam is not current.     Review of Systems   Constitutional: Positive for fatigue and weight loss.   Eyes: Negative for blurred vision and visual disturbance.   Respiratory: Negative for shortness of breath.    Cardiovascular: Negative for chest pain and leg swelling.   Gastrointestinal: Negative for abdominal pain, nausea and vomiting.   Endocrine: Positive for polydipsia and polyuria. Negative for polyphagia.   Genitourinary: Negative for impotence.   Musculoskeletal: Negative for myalgias.   Skin: Negative for pallor and rash.   Neurological: Positive for weakness. Negative for dizziness, tremors, seizures, speech difficulty and light-headedness.   Psychiatric/Behavioral: Negative for confusion, sleep disturbance and suicidal ideas. The patient is nervous/anxious.          /60   Temp 97.6 °F (36.4 °C) (Tympanic)   Ht 5' 1" (1.549 m)   Wt 57.4 kg (126 lb 8.7 oz)   LMP 06/23/2015   BMI 23.91 kg/m²   Objective:      Physical Exam   Constitutional: She is oriented to person, place, and time. She appears " well-developed and well-nourished.   HENT:   Head: Normocephalic and atraumatic.   Mouth/Throat: Oropharynx is clear and moist.   Cardiovascular: Normal rate, regular rhythm and normal heart sounds.    No murmur heard.  Pulmonary/Chest: Effort normal and breath sounds normal. She has no wheezes.   Abdominal: Soft. Bowel sounds are normal. There is no tenderness.   Musculoskeletal: She exhibits no edema.   Neurological: She is alert and oriented to person, place, and time.   Skin: Skin is warm and dry. No rash noted.   Psychiatric: She has a normal mood and affect.       Lab Visit on 09/08/2017   Component Date Value Ref Range Status    Sodium 09/08/2017 139  136 - 145 mmol/L Final    Potassium 09/08/2017 4.1  3.5 - 5.1 mmol/L Final    Chloride 09/08/2017 99  95 - 110 mmol/L Final    CO2 09/08/2017 25  23 - 29 mmol/L Final    Glucose 09/08/2017 359* 70 - 110 mg/dL Final    BUN, Bld 09/08/2017 16  6 - 20 mg/dL Final    Creatinine 09/08/2017 1.0  0.5 - 1.4 mg/dL Final    Calcium 09/08/2017 9.7  8.7 - 10.5 mg/dL Final    Total Protein 09/08/2017 8.3  6.0 - 8.4 g/dL Final    Albumin 09/08/2017 3.8  3.5 - 5.2 g/dL Final    Total Bilirubin 09/08/2017 0.5  0.1 - 1.0 mg/dL Final    Comment: For infants and newborns, interpretation of results should be based  on gestational age, weight and in agreement with clinical  observations.  Premature Infant recommended reference ranges:  Up to 24 hours.............<8.0 mg/dL  Up to 48 hours............<12.0 mg/dL  3-5 days..................<15.0 mg/dL  6-29 days.................<15.0 mg/dL      Alkaline Phosphatase 09/08/2017 100  55 - 135 U/L Final    AST 09/08/2017 17  10 - 40 U/L Final    ALT 09/08/2017 30  10 - 44 U/L Final    Anion Gap 09/08/2017 15  8 - 16 mmol/L Final    eGFR if African American 09/08/2017 >60.0  >60 mL/min/1.73 m^2 Final    eGFR if non African American 09/08/2017 >60.0  >60 mL/min/1.73 m^2 Final    Comment: Calculation used to obtain the  estimated glomerular filtration  rate (eGFR) is the CKD-EPI equation. Since race is unknown   in our information system, the eGFR values for   -American and Non--American patients are given   for each creatinine result.      Cholesterol 09/08/2017 332* 120 - 199 mg/dL Final    Comment: The National Cholesterol Education Program (NCEP) has set the  following guidelines (reference ranges) for Cholesterol:  Optimal.....................<200 mg/dL  Borderline High.............200-239 mg/dL  High........................> or = 240 mg/dL      Triglycerides 09/08/2017 232* 30 - 150 mg/dL Final    Comment: The National Cholesterol Education Program (NCEP) has set the  following guidelines (reference values) for triglycerides:  Normal......................<150 mg/dL  Borderline High.............150-199 mg/dL  High........................200-499 mg/dL      HDL 09/08/2017 63  40 - 75 mg/dL Final    Comment: The National Cholesterol Education Program (NCEP) has set the  following guidelines (reference values) for HDL Cholesterol:  Low...............<40 mg/dL  Optimal...........>60 mg/dL      LDL Cholesterol 09/08/2017 222.6* 63.0 - 159.0 mg/dL Final    Comment: The National Cholesterol Education Program (NCEP) has set the  following guidelines (reference values) for LDL Cholesterol:  Optimal.......................<130 mg/dL  Borderline High...............130-159 mg/dL  High..........................160-189 mg/dL  Very High.....................>190 mg/dL      HDL/Chol Ratio 09/08/2017 19.0* 20.0 - 50.0 % Final    Total Cholesterol/HDL Ratio 09/08/2017 5.3* 2.0 - 5.0 Final    Non-HDL Cholesterol 09/08/2017 269  mg/dL Final    Comment: Risk category and Non-HDL cholesterol goals:  Coronary heart disease (CHD)or equivalent (10-year risk of CHD >20%):  Non-HDL cholesterol goal     <130 mg/dL  Two or more CHD risk factors and 10-year risk of CHD <= 20%:  Non-HDL cholesterol goal     <160 mg/dL  0 to 1 CHD risk  factor:  Non-HDL cholesterol goal     <190 mg/dL      Hemoglobin A1C 09/09/2017 11.2* 4.0 - 5.6 % Final    Comment: According to ADA guidelines, hemoglobin A1c <7.0% represents  optimal control in non-pregnant diabetic patients. Different  metrics may apply to specific patient populations.   Standards of Medical Care in Diabetes-2016.  For the purpose of screening for the presence of diabetes:  <5.7%     Consistent with the absence of diabetes  5.7-6.4%  Consistent with increasing risk for diabetes   (prediabetes)  >or=6.5%  Consistent with diabetes  Currently, no consensus exists for use of hemoglobin A1c  for diagnosis of diabetes for children.  This Hemoglobin A1c assay has significant interference with fetal   hemoglobin   (HbF). The results are invalid for patients with abnormal amounts of   HbF,   including those with known Hereditary Persistence   of Fetal Hemoglobin. Heterozygous hemoglobin variants (HbAS, HbAC,   HbAD, HbAE, HbA2) do not significantly interfere with this assay;   however, presence of multiple variants in a sample may impact the %   interference.      Estimated Avg Glucose 09/09/2017 275* 68 - 131 mg/dL Final    TSH 09/08/2017 1.480  0.400 - 4.000 uIU/mL Final       Assessment:       1. Uncontrolled type 2 diabetes mellitus without complication, with long-term current use of insulin    2. Essential hypertension    3. Hyperlipidemia, mixed    4. Goiter, nontoxic, multinodular    5. Major depression, recurrent, chronic    6. Alcohol abuse        Plan:   Uncontrolled type 2 diabetes mellitus without complication, with long-term current use of insulin  Noted elevated blood glucose levels at 275 and A1c above 11.   Patient was advised to increase Lantus from 34-40 units daily and NovoLog 10-13 units 3 times daily  Advised to avoid skipping the night dose of NovoLog  Strict lifestyle changes recommended with 1800 ADA low-fat and low-cholesterol diet and exercise 30 minutes daily  Please  follow-up with diabetes clinic as scheduled  Follow-up in 2 months with blood glucose log with me as well as with diabetes clinic  Up-to-date with diabetic foot exam  If having any extremely elevated blood glucose levels above 400 encouraged to go to ER    Essential hypertension -blood pressure stable today currently on lisinopril 5 mg daily.    Hyperlipidemia, mixed  -     pravastatin (PRAVACHOL) 40 MG tablet; Take 1 tablet (40 mg total) by mouth once daily.  Dispense: 90 tablet; Refill: 3  Currently extremely elevated cholesterol levels, will increase pravastatin from 20-40 mg daily    Goiter, nontoxic, multinodular-stable and asymptomatic    Major depression, recurrent, chronic  -     escitalopram oxalate (LEXAPRO) 20 MG tablet; Take 1 tablet (20 mg total) by mouth once daily.  Dispense: 30 tablet; Refill: 11  Will increase Lexapro from 10-20 mg daily, follow-up in 2 months    Alcohol abuse-encouraged to restrict alcohol intake.      Other orders  -     Cancel: Ambulatory referral to Optometry  Patient was advised to make appointment with eye physician as patient is due for eye exam, would like to do it later

## 2017-11-29 ENCOUNTER — PATIENT OUTREACH (OUTPATIENT)
Dept: ADMINISTRATIVE | Facility: HOSPITAL | Age: 57
End: 2017-11-29

## 2017-11-29 NOTE — PROGRESS NOTES
Spoke with pt re rescheduling appt from 11/15/17 with Dr Cat f/u a1c >8. Pt stated will reschedule appt. Through [facility].

## 2017-12-19 ENCOUNTER — OFFICE VISIT (OUTPATIENT)
Dept: INTERNAL MEDICINE | Facility: CLINIC | Age: 57
End: 2017-12-19
Payer: COMMERCIAL

## 2017-12-19 VITALS
SYSTOLIC BLOOD PRESSURE: 128 MMHG | HEIGHT: 61 IN | DIASTOLIC BLOOD PRESSURE: 70 MMHG | TEMPERATURE: 98 F | HEART RATE: 82 BPM | BODY MASS INDEX: 23.65 KG/M2 | OXYGEN SATURATION: 99 % | WEIGHT: 125.25 LBS

## 2017-12-19 DIAGNOSIS — F33.9 MAJOR DEPRESSION, RECURRENT, CHRONIC: ICD-10-CM

## 2017-12-19 DIAGNOSIS — Z79.4 TYPE 2 DIABETES MELLITUS WITHOUT COMPLICATION, WITH LONG-TERM CURRENT USE OF INSULIN: Primary | ICD-10-CM

## 2017-12-19 DIAGNOSIS — E11.9 TYPE 2 DIABETES MELLITUS WITHOUT COMPLICATION, WITH LONG-TERM CURRENT USE OF INSULIN: Primary | ICD-10-CM

## 2017-12-19 DIAGNOSIS — E78.2 HYPERLIPIDEMIA, MIXED: ICD-10-CM

## 2017-12-19 DIAGNOSIS — I10 ESSENTIAL HYPERTENSION: ICD-10-CM

## 2017-12-19 DIAGNOSIS — Z91.148 NON COMPLIANCE W MEDICATION REGIMEN: ICD-10-CM

## 2017-12-19 PROCEDURE — 99999 PR PBB SHADOW E&M-EST. PATIENT-LVL IV: CPT | Mod: PBBFAC,,, | Performed by: FAMILY MEDICINE

## 2017-12-19 PROCEDURE — 99214 OFFICE O/P EST MOD 30 MIN: CPT | Mod: S$GLB,,, | Performed by: FAMILY MEDICINE

## 2017-12-19 RX ORDER — EZETIMIBE 10 MG/1
10 TABLET ORAL DAILY
Qty: 90 TABLET | Refills: 3 | Status: SHIPPED | OUTPATIENT
Start: 2017-12-19 | End: 2019-03-29 | Stop reason: SDUPTHER

## 2017-12-19 RX ORDER — VENLAFAXINE 75 MG/1
75 TABLET ORAL 2 TIMES DAILY
Qty: 60 TABLET | Refills: 1 | Status: SHIPPED | OUTPATIENT
Start: 2017-12-19 | End: 2018-01-19 | Stop reason: DRUGHIGH

## 2017-12-19 NOTE — PROGRESS NOTES
"Subjective:       Patient ID: Leesa Le is a 57 y.o. female.    Chief Complaint: Depression    57-year-old Afro-American female patient with Patient Active Problem List:     Type 2 diabetes mellitus without complication     Goiter, nontoxic, multinodular     Statin intolerance     Hyperlipidemia, mixed     Abnormal ECG     Major depression, recurrent, chronic     Essential hypertension  Here for follow-up on chronic medical conditions and here to discuss about worsening depression, patient reports that she's been taking Lexapro 20 mg daily but has not been helping her depression.  Denies of any anxiety, suicidal or homicidal thoughts.  Reports occasional palpitations.   Denies of any chest pain or shortness of breath  Has not been taking her Lantus regularly and takes NovoLog insulin as needed per sliding scale once or  twice a day.  Reports polydipsia and polyphagia but denies of any polyphagia   Denies of any tingling or numbness sensation to extremities  Patient secondary to statin intolerance, was not able to take pravastatin 40 mg as prescribed, reports that she can try taking it every other day.         Review of Systems   Constitutional: Negative for fatigue.   Eyes: Negative for visual disturbance.   Respiratory: Negative for shortness of breath.    Cardiovascular: Negative for chest pain and leg swelling.   Gastrointestinal: Negative for abdominal pain, nausea and vomiting.   Endocrine: Positive for polydipsia and polyuria. Negative for polyphagia.   Musculoskeletal: Negative for myalgias.   Skin: Negative for rash.   Neurological: Negative for weakness, light-headedness, numbness and headaches.   Psychiatric/Behavioral: Positive for dysphoric mood. Negative for self-injury, sleep disturbance and suicidal ideas. The patient is not nervous/anxious.          /70   Pulse 82   Temp 98.2 °F (36.8 °C) (Tympanic)   Ht 5' 1" (1.549 m)   Wt 56.8 kg (125 lb 3.5 oz)   LMP 06/23/2015   SpO2 99%   BMI " 23.66 kg/m²   Objective:      Physical Exam   Constitutional: She is oriented to person, place, and time. She appears well-developed and well-nourished.   HENT:   Head: Normocephalic and atraumatic.   Mouth/Throat: Oropharynx is clear and moist.   Cardiovascular: Normal rate, regular rhythm and normal heart sounds.    No murmur heard.  Pulmonary/Chest: Effort normal and breath sounds normal. She has no wheezes.   Abdominal: Soft. Bowel sounds are normal. There is no tenderness.   Musculoskeletal: She exhibits no edema or tenderness.   Neurological: She is alert and oriented to person, place, and time.   Skin: Skin is warm and dry. No rash noted.   Psychiatric:   Patient seems to be depressed today         Assessment:       1. Type 2 diabetes mellitus without complication, with long-term current use of insulin    2. Essential hypertension    3. Hyperlipidemia, mixed    4. Major depression, recurrent, chronic    5. Non compliance w medication regimen        Plan:   Type 2 diabetes mellitus without complication, with long-term current use of insulin  -     Comprehensive metabolic panel; Future; Expected date: 12/19/2017  -     Lipid panel; Future; Expected date: 12/19/2017  -     CBC auto differential; Future; Expected date: 12/19/2017  -     Hemoglobin A1c; Future; Expected date: 12/19/2017  -     Ambulatory Referral to Diabetic Education  -     Ambulatory referral to Optometry  -     Ambulatory referral to Outpatient Case Management  Previous labs showed severely uncontrolled A1c and blood glucose levels.   Patient has been noncompliant with taking her medication  Will refer to outpatient case management, diabetes clinic and due for eye exam.   Will check fasting labs tomorrow  Encouraged to start taking Lantus 35 units in the evening and NovoLog as per sliding scale 3 times daily.   Advised to monitor blood glucose levels daily and maintain blood glucose log and follow-up with diabetes clinic.  Patient up-to-date  with foot exam     Essential hypertension  -     Comprehensive metabolic panel; Future; Expected date: 12/19/2017  -     Lipid panel; Future; Expected date: 12/19/2017  -     TSH; Future; Expected date: 12/19/2017   blood pressure stable today currently on lisinopril 5 mg daily    Hyperlipidemia, mixed  -     ezetimibe (ZETIA) 10 mg tablet; Take 1 tablet (10 mg total) by mouth once daily.  Dispense: 90 tablet; Refill: 3  -     Lipid panel; Future; Expected date: 12/19/2017   currently not taking pravastatin 40 mg, previous labs showed severely uncontrolled cholesterol levels  Will start on Zetia 10 mg and was advised to try taking pravastatin 40 mg every other day if possible  Follow-up in one month    Major depression, recurrent, chronic  -     venlafaxine (EFFEXOR) 75 MG tablet; Take 1 tablet (75 mg total) by mouth 2 (two) times daily.  Dispense: 60 tablet; Refill: 1  -     Ambulatory referral to Outpatient Case Management   Will discontinue Lexapro 20 mg, and will place on Effexor 75 mg twice daily  Follow-up in one month  Continue counseling with Ms. Michelle Umana      Non compliance w medication regimen

## 2017-12-20 ENCOUNTER — LAB VISIT (OUTPATIENT)
Dept: LAB | Facility: HOSPITAL | Age: 57
End: 2017-12-20
Attending: FAMILY MEDICINE
Payer: COMMERCIAL

## 2017-12-20 ENCOUNTER — OUTPATIENT CASE MANAGEMENT (OUTPATIENT)
Dept: ADMINISTRATIVE | Facility: OTHER | Age: 57
End: 2017-12-20

## 2017-12-20 DIAGNOSIS — Z79.4 TYPE 2 DIABETES MELLITUS WITHOUT COMPLICATION, WITH LONG-TERM CURRENT USE OF INSULIN: ICD-10-CM

## 2017-12-20 DIAGNOSIS — E78.2 HYPERLIPIDEMIA, MIXED: ICD-10-CM

## 2017-12-20 DIAGNOSIS — I10 ESSENTIAL HYPERTENSION: ICD-10-CM

## 2017-12-20 DIAGNOSIS — E11.9 TYPE 2 DIABETES MELLITUS WITHOUT COMPLICATION, WITH LONG-TERM CURRENT USE OF INSULIN: ICD-10-CM

## 2017-12-20 LAB
ALBUMIN SERPL BCP-MCNC: 3.6 G/DL
ALP SERPL-CCNC: 108 U/L
ALT SERPL W/O P-5'-P-CCNC: 28 U/L
ANION GAP SERPL CALC-SCNC: 10 MMOL/L
AST SERPL-CCNC: 22 U/L
BASOPHILS # BLD AUTO: 0.08 K/UL
BASOPHILS NFR BLD: 1.7 %
BILIRUB SERPL-MCNC: 0.5 MG/DL
BUN SERPL-MCNC: 9 MG/DL
CALCIUM SERPL-MCNC: 9.1 MG/DL
CHLORIDE SERPL-SCNC: 104 MMOL/L
CHOLEST SERPL-MCNC: 323 MG/DL
CHOLEST/HDLC SERPL: 6 {RATIO}
CO2 SERPL-SCNC: 25 MMOL/L
CREAT SERPL-MCNC: 0.9 MG/DL
DIFFERENTIAL METHOD: ABNORMAL
EOSINOPHIL # BLD AUTO: 0.3 K/UL
EOSINOPHIL NFR BLD: 5.4 %
ERYTHROCYTE [DISTWIDTH] IN BLOOD BY AUTOMATED COUNT: 15.3 %
EST. GFR  (AFRICAN AMERICAN): >60 ML/MIN/1.73 M^2
EST. GFR  (NON AFRICAN AMERICAN): >60 ML/MIN/1.73 M^2
ESTIMATED AVG GLUCOSE: 283 MG/DL
GLUCOSE SERPL-MCNC: 267 MG/DL
HBA1C MFR BLD HPLC: 11.5 %
HCT VFR BLD AUTO: 34.7 %
HDLC SERPL-MCNC: 54 MG/DL
HDLC SERPL: 16.7 %
HGB BLD-MCNC: 11.7 G/DL
IMM GRANULOCYTES # BLD AUTO: 0.01 K/UL
IMM GRANULOCYTES NFR BLD AUTO: 0.2 %
LDLC SERPL CALC-MCNC: 235.8 MG/DL
LYMPHOCYTES # BLD AUTO: 2.2 K/UL
LYMPHOCYTES NFR BLD: 44.6 %
MCH RBC QN AUTO: 24.4 PG
MCHC RBC AUTO-ENTMCNC: 33.7 G/DL
MCV RBC AUTO: 72 FL
MONOCYTES # BLD AUTO: 0.4 K/UL
MONOCYTES NFR BLD: 7.2 %
NEUTROPHILS # BLD AUTO: 2 K/UL
NEUTROPHILS NFR BLD: 40.9 %
NONHDLC SERPL-MCNC: 269 MG/DL
NRBC BLD-RTO: 0 /100 WBC
PLATELET # BLD AUTO: 321 K/UL
PMV BLD AUTO: 10.3 FL
POTASSIUM SERPL-SCNC: 4.1 MMOL/L
PROT SERPL-MCNC: 7.8 G/DL
RBC # BLD AUTO: 4.8 M/UL
SODIUM SERPL-SCNC: 139 MMOL/L
TRIGL SERPL-MCNC: 166 MG/DL
TSH SERPL DL<=0.005 MIU/L-ACNC: 0.66 UIU/ML
WBC # BLD AUTO: 4.84 K/UL

## 2017-12-20 PROCEDURE — 80053 COMPREHEN METABOLIC PANEL: CPT

## 2017-12-20 PROCEDURE — 83036 HEMOGLOBIN GLYCOSYLATED A1C: CPT

## 2017-12-20 PROCEDURE — 84443 ASSAY THYROID STIM HORMONE: CPT

## 2017-12-20 PROCEDURE — 80061 LIPID PANEL: CPT

## 2017-12-20 PROCEDURE — 36415 COLL VENOUS BLD VENIPUNCTURE: CPT | Mod: PO

## 2017-12-20 PROCEDURE — 85025 COMPLETE CBC W/AUTO DIFF WBC: CPT

## 2017-12-20 NOTE — PROGRESS NOTES
Please note the following patients information has been forwarded to [facility] for Case Management or .    Please see the media section in patient's chart for additional details.    Please contact Outpatient Complex care Management at ext [phone] with any questions.    Thank you,      Chary Piper, SSC

## 2018-01-19 ENCOUNTER — OFFICE VISIT (OUTPATIENT)
Dept: INTERNAL MEDICINE | Facility: CLINIC | Age: 58
End: 2018-01-19
Payer: COMMERCIAL

## 2018-01-19 VITALS
TEMPERATURE: 98 F | HEART RATE: 86 BPM | BODY MASS INDEX: 23.27 KG/M2 | WEIGHT: 123.25 LBS | OXYGEN SATURATION: 99 % | DIASTOLIC BLOOD PRESSURE: 92 MMHG | HEIGHT: 61 IN | SYSTOLIC BLOOD PRESSURE: 142 MMHG

## 2018-01-19 DIAGNOSIS — Z79.4 TYPE 2 DIABETES MELLITUS WITH HYPERGLYCEMIA, WITH LONG-TERM CURRENT USE OF INSULIN: ICD-10-CM

## 2018-01-19 DIAGNOSIS — I10 ESSENTIAL HYPERTENSION: ICD-10-CM

## 2018-01-19 DIAGNOSIS — E78.2 HYPERLIPIDEMIA, MIXED: ICD-10-CM

## 2018-01-19 DIAGNOSIS — E11.65 TYPE 2 DIABETES MELLITUS WITH HYPERGLYCEMIA, WITH LONG-TERM CURRENT USE OF INSULIN: ICD-10-CM

## 2018-01-19 DIAGNOSIS — Z91.148 NON COMPLIANCE W MEDICATION REGIMEN: ICD-10-CM

## 2018-01-19 DIAGNOSIS — F33.9 MAJOR DEPRESSION, RECURRENT, CHRONIC: Primary | ICD-10-CM

## 2018-01-19 DIAGNOSIS — Z78.9 STATIN INTOLERANCE: ICD-10-CM

## 2018-01-19 PROCEDURE — 99999 PR PBB SHADOW E&M-EST. PATIENT-LVL IV: CPT | Mod: PBBFAC,,, | Performed by: FAMILY MEDICINE

## 2018-01-19 PROCEDURE — 99214 OFFICE O/P EST MOD 30 MIN: CPT | Mod: S$GLB,,, | Performed by: FAMILY MEDICINE

## 2018-01-19 RX ORDER — VENLAFAXINE 100 MG/1
100 TABLET ORAL 2 TIMES DAILY
Qty: 60 TABLET | Refills: 3 | Status: SHIPPED | OUTPATIENT
Start: 2018-01-19 | End: 2018-05-07 | Stop reason: DRUGHIGH

## 2018-01-19 RX ORDER — LISINOPRIL 10 MG/1
10 TABLET ORAL DAILY
Qty: 90 TABLET | Refills: 1 | Status: SHIPPED | OUTPATIENT
Start: 2018-01-19 | End: 2019-03-28 | Stop reason: SDUPTHER

## 2018-01-19 NOTE — PROGRESS NOTES
Subjective:       Patient ID: Leesa Le is a 57 y.o. female.    Chief Complaint: Trouble sleeping and Migraine    57-year-old -American female patient with Patient Active Problem List:     Type 2 diabetes mellitus with hyperglycemia, with long-term current use of insulin     Goiter, nontoxic, multinodular     Statin intolerance     Hyperlipidemia, mixed     Abnormal ECG     Major depression, recurrent, chronic     Essential hypertension     Non compliance w medication regimen  Here for depression.  Reports that she has been depressed and not able to concentrate, made this appointment as per her counselor request, patient has been followed by Michelle Umana, psychiatry counselor. Patient Reports that she works from home and would like to see whether she can get [facility] paperwork filled out.    Denies of any suicidal or homicidal thoughts  Has been taking Effexor 75 mg twice daily but has not been helping her with depression  Patient does not have any interest to do any other things  Has been taking her insulin but has not been eating appropriately, has not been monitoring her sugars regularly  Reports that she has stopped taking  Pravastatin due to body aches, and has been just taking zetia   Has not seen eye physician and diabetes clinic after several referrals placed.         Review of Systems   Constitutional: Negative for fatigue.   Eyes: Negative for visual disturbance.   Respiratory: Negative for shortness of breath.    Cardiovascular: Negative for chest pain and leg swelling.   Gastrointestinal: Negative for abdominal pain, nausea and vomiting.   Endocrine: Negative for polydipsia, polyphagia and polyuria.   Musculoskeletal: Negative for myalgias.   Skin: Negative for rash.   Neurological: Negative for weakness, light-headedness, numbness and headaches.   Psychiatric/Behavioral: Positive for dysphoric mood. Negative for self-injury, sleep disturbance and suicidal ideas. The patient is nervous/anxious.   "        BP (!) 142/92 (BP Location: Left arm, Patient Position: Sitting)   Pulse 86   Temp 98.2 °F (36.8 °C) (Tympanic)   Ht 5' 1" (1.549 m)   Wt 55.9 kg (123 lb 3.8 oz)   LMP 06/23/2015   SpO2 99%   BMI 23.29 kg/m²   Objective:      Physical Exam   Constitutional: She is oriented to person, place, and time. She appears well-developed and well-nourished.   HENT:   Head: Normocephalic and atraumatic.   Mouth/Throat: Oropharynx is clear and moist.   Cardiovascular: Normal rate, regular rhythm and normal heart sounds.    No murmur heard.  Pulmonary/Chest: Effort normal and breath sounds normal. She has no wheezes.   Abdominal: Soft. Bowel sounds are normal. There is no tenderness.   Musculoskeletal: She exhibits no edema.   Neurological: She is alert and oriented to person, place, and time.   Skin: Skin is warm and dry. No rash noted.   Psychiatric:   Seems to be depressed today         Assessment:       1. Major depression, recurrent, chronic    2. Essential hypertension    3. Hyperlipidemia, mixed    4. Statin intolerance    5. Type 2 diabetes mellitus with hyperglycemia, with long-term current use of insulin    6. Non compliance w medication regimen        Plan:   Major depression, recurrent, chronic  -     venlafaxine (EFFEXOR) 100 MG Tab; Take 1 tablet (100 mg total) by mouth 2 (two) times daily.  Dispense: 60 tablet; Refill: 3  -     Ambulatory referral to Psychiatry  Will increase Effexor from 75 -100 mg twice daily, and will refer to psychiatry for further evaluation  Cannot give FMLA paperwork  filled out until further assessed by psychiatry    Essential hypertension  -     lisinopril 10 MG tablet; Take 1 tablet (10 mg total) by mouth once daily.  Dispense: 90 tablet; Refill: 1  -     Hypertension Digital Medicine (HDMP) Enrollment Order  -     Hypertension Digital Medicine (HDMP): Assign Onboarding Questionnaires  Blood pressure elevated today, has not been taking lisinopril 5 mg, will increase to 10 " mg daily and follow up with PA in 4 weeks    Hyperlipidemia, mixed  Statin intolerance  Currently on Zetia 10 mg daily    Type 2 diabetes mellitus with hyperglycemia, with long-term current use of insulin  -     Ambulatory Referral to Diabetic Education  -     Ambulatory referral to Optometry  Severely uncontrolled A1c due to noncompliance.   Patient was advised to take medications as prescribed, start monitoring blood glucose levels regularly and maintain blood glucose log.     Non compliance w medication regimen

## 2018-01-26 ENCOUNTER — OFFICE VISIT (OUTPATIENT)
Dept: PSYCHIATRY | Facility: CLINIC | Age: 58
End: 2018-01-26
Payer: COMMERCIAL

## 2018-01-26 DIAGNOSIS — F43.21 GRIEF REACTION: ICD-10-CM

## 2018-01-26 DIAGNOSIS — F33.1 MAJOR DEPRESSIVE DISORDER, RECURRENT EPISODE, MODERATE: Primary | ICD-10-CM

## 2018-01-26 PROCEDURE — 90791 PSYCH DIAGNOSTIC EVALUATION: CPT | Mod: S$GLB,,, | Performed by: SOCIAL WORKER

## 2018-01-28 DIAGNOSIS — F41.8 ANXIETY ASSOCIATED WITH DEPRESSION: ICD-10-CM

## 2018-01-28 RX ORDER — ESCITALOPRAM OXALATE 10 MG/1
10 TABLET ORAL DAILY
Qty: 90 TABLET | Refills: 1 | OUTPATIENT
Start: 2018-01-28 | End: 2019-01-28
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
## 2018-02-20 ENCOUNTER — TELEPHONE (OUTPATIENT)
Dept: INTERNAL MEDICINE | Facility: CLINIC | Age: 58
End: 2018-02-20

## 2018-02-20 NOTE — TELEPHONE ENCOUNTER
Spoke to leonardo in psychiatry. Pt is scheduled with our psychiatrist now and leonardo will send a appt reminder out for pt. Pt verbalized understanding

## 2018-02-20 NOTE — TELEPHONE ENCOUNTER
----- Message from David Lerma sent at 2/20/2018 11:59 AM CST -----  Contact: Michelle Umana-   Michelle Umana- requested to be called back in regards to pt above will elaborate during callback...[phone]

## 2018-03-06 ENCOUNTER — TELEPHONE (OUTPATIENT)
Dept: INTERNAL MEDICINE | Facility: CLINIC | Age: 58
End: 2018-03-06

## 2018-03-06 NOTE — TELEPHONE ENCOUNTER
----- Message from Mandy Rizzo sent at 3/6/2018  1:31 PM CST -----  Contact: Michelle Umana,   Calling concerning the patient and her anxiety level. Need to be seen ASAP. Please call Michelle @ [phone].

## 2018-03-19 ENCOUNTER — OFFICE VISIT (OUTPATIENT)
Dept: INTERNAL MEDICINE | Facility: CLINIC | Age: 58
End: 2018-03-19
Payer: COMMERCIAL

## 2018-03-19 VITALS
HEART RATE: 92 BPM | BODY MASS INDEX: 23.27 KG/M2 | SYSTOLIC BLOOD PRESSURE: 122 MMHG | WEIGHT: 123.25 LBS | DIASTOLIC BLOOD PRESSURE: 82 MMHG | HEIGHT: 61 IN | OXYGEN SATURATION: 99 % | TEMPERATURE: 97 F

## 2018-03-19 DIAGNOSIS — Z78.9 STATIN INTOLERANCE: ICD-10-CM

## 2018-03-19 DIAGNOSIS — Z79.4 TYPE 2 DIABETES MELLITUS WITH HYPERGLYCEMIA, WITH LONG-TERM CURRENT USE OF INSULIN: ICD-10-CM

## 2018-03-19 DIAGNOSIS — E11.65 TYPE 2 DIABETES MELLITUS WITH HYPERGLYCEMIA, WITH LONG-TERM CURRENT USE OF INSULIN: ICD-10-CM

## 2018-03-19 DIAGNOSIS — F33.9 MAJOR DEPRESSION, RECURRENT, CHRONIC: Primary | ICD-10-CM

## 2018-03-19 DIAGNOSIS — I10 ESSENTIAL HYPERTENSION: ICD-10-CM

## 2018-03-19 DIAGNOSIS — Z91.148 NON COMPLIANCE W MEDICATION REGIMEN: ICD-10-CM

## 2018-03-19 DIAGNOSIS — E04.2 GOITER, NONTOXIC, MULTINODULAR: ICD-10-CM

## 2018-03-19 DIAGNOSIS — D50.9 IRON DEFICIENCY ANEMIA, UNSPECIFIED IRON DEFICIENCY ANEMIA TYPE: ICD-10-CM

## 2018-03-19 DIAGNOSIS — E78.2 HYPERLIPIDEMIA, MIXED: ICD-10-CM

## 2018-03-19 PROCEDURE — 3079F DIAST BP 80-89 MM HG: CPT | Mod: CPTII,S$GLB,, | Performed by: FAMILY MEDICINE

## 2018-03-19 PROCEDURE — 3074F SYST BP LT 130 MM HG: CPT | Mod: CPTII,S$GLB,, | Performed by: FAMILY MEDICINE

## 2018-03-19 PROCEDURE — 99214 OFFICE O/P EST MOD 30 MIN: CPT | Mod: S$GLB,,, | Performed by: FAMILY MEDICINE

## 2018-03-19 PROCEDURE — 3046F HEMOGLOBIN A1C LEVEL >9.0%: CPT | Mod: CPTII,S$GLB,, | Performed by: FAMILY MEDICINE

## 2018-03-19 PROCEDURE — 99999 PR PBB SHADOW E&M-EST. PATIENT-LVL IV: CPT | Mod: PBBFAC,,, | Performed by: FAMILY MEDICINE

## 2018-03-19 NOTE — PROGRESS NOTES
sheSubjective:       Patient ID: Leesa Le is a 58 y.o. female.    Chief Complaint: Depression    58-year-old -American female patient with Patient Active Problem List:     Type 2 diabetes mellitus with hyperglycemia, with long-term current use of insulin     Goiter, nontoxic, multinodular     Statin intolerance     Hyperlipidemia, mixed     Abnormal ECG     Major depression, recurrent, chronic     Essential hypertension     Non compliance w medication regimen     Iron deficiency anemia  Here to discuss about depression and chronic medical conditions.  Patient has been recently increased on venlafaxine from 75 -100 mg twice daily reports no significant response.  Patient has appointment with psychiatry in May   denies of any suicidal or homicidal thoughts but continues to have ongoing depression,  does not want to increase the dose of medication or changing to any other medication patient has been occasionally noncompliant with taking medications, sugars have been running the range of 150s to 300s, does not want to see diabetes clinic or any other specialist at this time, patient has not been to ophthalmologist for the past 2 years.   Denies of any chest pain or difficulty breathing         Review of Systems   Constitutional: Negative for fatigue.   Eyes: Negative for visual disturbance.   Respiratory: Negative for shortness of breath.    Cardiovascular: Negative for chest pain and leg swelling.   Gastrointestinal: Negative for abdominal pain, nausea and vomiting.   Endocrine: Negative for polydipsia, polyphagia and polyuria.   Musculoskeletal: Negative for myalgias.   Skin: Negative for rash.   Neurological: Negative for weakness, light-headedness, numbness and headaches.   Psychiatric/Behavioral: Positive for dysphoric mood. Negative for sleep disturbance. The patient is nervous/anxious.          /82 (BP Location: Right arm, Patient Position: Sitting)   Pulse 92   Temp 97.1 °F (36.2 °C)  "(Tympanic)   Ht 5' 1" (1.549 m)   Wt 55.9 kg (123 lb 3.8 oz)   LMP 06/23/2015   SpO2 99%   BMI 23.29 kg/m²   Objective:      Physical Exam   Constitutional: She is oriented to person, place, and time. She appears well-developed and well-nourished.   HENT:   Head: Normocephalic and atraumatic.   Mouth/Throat: Oropharynx is clear and moist.   Cardiovascular: Normal rate, regular rhythm and normal heart sounds.    No murmur heard.  Pulses:       Dorsalis pedis pulses are 2+ on the right side, and 2+ on the left side.   Pulmonary/Chest: Effort normal and breath sounds normal. She has no wheezes.   Abdominal: Soft. Bowel sounds are normal. There is no tenderness.   Musculoskeletal: She exhibits no edema.   Feet:   Right Foot:   Protective Sensation: 10 sites tested. 10 sites sensed.   Skin Integrity: Negative for callus or dry skin.   Left Foot:   Protective Sensation: 10 sites tested. 10 sites sensed.   Skin Integrity: Negative for callus or dry skin.   Neurological: She is alert and oriented to person, place, and time.   Skin: Skin is warm and dry. No rash noted.   Psychiatric:   Patient seems to be depressed today         Assessment:       1. Major depression, recurrent, chronic    2. Essential hypertension    3. Type 2 diabetes mellitus with hyperglycemia, with long-term current use of insulin    4. Goiter, nontoxic, multinodular    5. Hyperlipidemia, mixed    6. Statin intolerance    7. Non compliance w medication regimen    8. Iron deficiency anemia, unspecified iron deficiency anemia type        Plan:   Major depression, recurrent, chronic  -     TSH; Future; Expected date: 03/19/2018  Currently taking venlafaxine 100 mg twice daily, patient was advised to consider changing the doses patient refuses at this time  Encouraged to keep appointment with psychiatry scheduled,     Essential hypertension  -     Comprehensive metabolic panel; Future; Expected date: 03/19/2018  -     Lipid panel; Future; Expected date: " 03/19/2018  -     TSH; Future; Expected date: 03/19/2018   blood pressure stable today currently on lisinopril 10 mg daily, with improvement in blood pressures after increasing the dose from 5-10 mg daily    Type 2 diabetes mellitus with hyperglycemia, with long-term current use of insulin  -     Comprehensive metabolic panel; Future; Expected date: 03/19/2018  -     Lipid panel; Future; Expected date: 03/19/2018  -     Hemoglobin A1c; Future; Expected date: 03/19/2018  -     Microalbumin/creatinine urine ratio; Future; Expected date: 03/19/2018  -     Ambulatory Referral to Diabetic Education  -     Ambulatory consult to Optometry   diabetic foot exam stable today  Previously labs showed severely uncontrolled A1c, check fasting labs tomorrow  Patient needs diabetes clinic appointment in optometry  Medication compliance reinforced patient today    Goiter, nontoxic, multinodular  -     TSH; Future; Expected date: 03/19/2018   stable and asymptomatic    Hyperlipidemia, mixed  -     Lipid panel; Future; Expected date: 03/19/2018  Statin intolerance  Currently on Zetia 10 mg daily     Non compliance w medication regimen.  - reinforced on medication compliance       Iron deficiency anemia, unspecified iron deficiency anemia type  -     CBC auto differential; Future; Expected date: 03/19/2018    Will check further labs

## 2018-03-20 ENCOUNTER — LAB VISIT (OUTPATIENT)
Dept: LAB | Facility: HOSPITAL | Age: 58
End: 2018-03-20
Attending: FAMILY MEDICINE
Payer: COMMERCIAL

## 2018-03-20 DIAGNOSIS — F33.9 MAJOR DEPRESSION, RECURRENT, CHRONIC: ICD-10-CM

## 2018-03-20 DIAGNOSIS — I10 ESSENTIAL HYPERTENSION: ICD-10-CM

## 2018-03-20 DIAGNOSIS — D50.9 IRON DEFICIENCY ANEMIA, UNSPECIFIED IRON DEFICIENCY ANEMIA TYPE: ICD-10-CM

## 2018-03-20 DIAGNOSIS — Z79.4 TYPE 2 DIABETES MELLITUS WITH HYPERGLYCEMIA, WITH LONG-TERM CURRENT USE OF INSULIN: ICD-10-CM

## 2018-03-20 DIAGNOSIS — E78.2 HYPERLIPIDEMIA, MIXED: ICD-10-CM

## 2018-03-20 DIAGNOSIS — E04.2 GOITER, NONTOXIC, MULTINODULAR: ICD-10-CM

## 2018-03-20 DIAGNOSIS — E11.65 TYPE 2 DIABETES MELLITUS WITH HYPERGLYCEMIA, WITH LONG-TERM CURRENT USE OF INSULIN: ICD-10-CM

## 2018-03-20 LAB
ALBUMIN SERPL BCP-MCNC: 3.7 G/DL
ALP SERPL-CCNC: 94 U/L
ALT SERPL W/O P-5'-P-CCNC: 29 U/L
ANION GAP SERPL CALC-SCNC: 11 MMOL/L
AST SERPL-CCNC: 21 U/L
BASOPHILS # BLD AUTO: 0.06 K/UL
BASOPHILS NFR BLD: 1.1 %
BILIRUB SERPL-MCNC: 0.4 MG/DL
BUN SERPL-MCNC: 16 MG/DL
CALCIUM SERPL-MCNC: 9.3 MG/DL
CHLORIDE SERPL-SCNC: 103 MMOL/L
CHOLEST SERPL-MCNC: 274 MG/DL
CHOLEST/HDLC SERPL: 4.6 {RATIO}
CO2 SERPL-SCNC: 27 MMOL/L
CREAT SERPL-MCNC: 0.8 MG/DL
DIFFERENTIAL METHOD: ABNORMAL
EOSINOPHIL # BLD AUTO: 0.3 K/UL
EOSINOPHIL NFR BLD: 4.6 %
ERYTHROCYTE [DISTWIDTH] IN BLOOD BY AUTOMATED COUNT: 15.6 %
EST. GFR  (AFRICAN AMERICAN): >60 ML/MIN/1.73 M^2
EST. GFR  (NON AFRICAN AMERICAN): >60 ML/MIN/1.73 M^2
GLUCOSE SERPL-MCNC: 90 MG/DL
HCT VFR BLD AUTO: 35.3 %
HDLC SERPL-MCNC: 60 MG/DL
HDLC SERPL: 21.9 %
HGB BLD-MCNC: 11.6 G/DL
IMM GRANULOCYTES # BLD AUTO: 0.02 K/UL
IMM GRANULOCYTES NFR BLD AUTO: 0.4 %
LDLC SERPL CALC-MCNC: 178.4 MG/DL
LYMPHOCYTES # BLD AUTO: 2.3 K/UL
LYMPHOCYTES NFR BLD: 41.1 %
MCH RBC QN AUTO: 24.9 PG
MCHC RBC AUTO-ENTMCNC: 32.9 G/DL
MCV RBC AUTO: 76 FL
MONOCYTES # BLD AUTO: 0.4 K/UL
MONOCYTES NFR BLD: 6.9 %
NEUTROPHILS # BLD AUTO: 2.5 K/UL
NEUTROPHILS NFR BLD: 45.9 %
NONHDLC SERPL-MCNC: 214 MG/DL
NRBC BLD-RTO: 0 /100 WBC
PLATELET # BLD AUTO: 378 K/UL
PMV BLD AUTO: 9.9 FL
POTASSIUM SERPL-SCNC: 3.9 MMOL/L
PROT SERPL-MCNC: 7.5 G/DL
RBC # BLD AUTO: 4.66 M/UL
SODIUM SERPL-SCNC: 141 MMOL/L
TRIGL SERPL-MCNC: 178 MG/DL
TSH SERPL DL<=0.005 MIU/L-ACNC: 1.19 UIU/ML
WBC # BLD AUTO: 5.47 K/UL

## 2018-03-20 PROCEDURE — 83036 HEMOGLOBIN GLYCOSYLATED A1C: CPT

## 2018-03-20 PROCEDURE — 84443 ASSAY THYROID STIM HORMONE: CPT

## 2018-03-20 PROCEDURE — 36415 COLL VENOUS BLD VENIPUNCTURE: CPT | Mod: PO

## 2018-03-20 PROCEDURE — 80053 COMPREHEN METABOLIC PANEL: CPT

## 2018-03-20 PROCEDURE — 85025 COMPLETE CBC W/AUTO DIFF WBC: CPT

## 2018-03-20 PROCEDURE — 80061 LIPID PANEL: CPT

## 2018-03-21 ENCOUNTER — TELEPHONE (OUTPATIENT)
Dept: INTERNAL MEDICINE | Facility: CLINIC | Age: 58
End: 2018-03-21

## 2018-03-21 DIAGNOSIS — Z79.4 TYPE 2 DIABETES MELLITUS WITH HYPERGLYCEMIA, WITH LONG-TERM CURRENT USE OF INSULIN: Primary | ICD-10-CM

## 2018-03-21 DIAGNOSIS — D50.9 IRON DEFICIENCY ANEMIA, UNSPECIFIED IRON DEFICIENCY ANEMIA TYPE: ICD-10-CM

## 2018-03-21 DIAGNOSIS — E78.2 HYPERLIPIDEMIA, MIXED: ICD-10-CM

## 2018-03-21 DIAGNOSIS — E11.65 TYPE 2 DIABETES MELLITUS WITH HYPERGLYCEMIA, WITH LONG-TERM CURRENT USE OF INSULIN: Primary | ICD-10-CM

## 2018-03-21 LAB
ESTIMATED AVG GLUCOSE: 263 MG/DL
HBA1C MFR BLD HPLC: 10.8 %

## 2018-03-21 RX ORDER — FENOFIBRATE 54 MG/1
54 TABLET ORAL DAILY
Qty: 30 TABLET | Refills: 6 | Status: SHIPPED | OUTPATIENT
Start: 2018-03-21 | End: 2020-09-21

## 2018-03-21 RX ORDER — FERROUS SULFATE 325(65) MG
325 TABLET ORAL DAILY
Qty: 30 TABLET | Refills: 6 | Status: SHIPPED | OUTPATIENT
Start: 2018-03-21 | End: 2019-06-14 | Stop reason: SDUPTHER

## 2018-03-21 NOTE — TELEPHONE ENCOUNTER
Called and spoke with pt regarding labs, advised of medications and recommendations. Pt verbalized understanding. TNJ                  Severely uncontrolled A1c with blood glucose levels, patient currently taking Lantus 34 units daily, please advise to increase to 40 units daily   Elevated cholesterol levels noted, currently taking Zetia 10 mg but no significant response noted.   Advised to consider starting on low-dose of cholesterol medication, please check with the patient to see which cholesterol medications she has tried in the past.   Will start on TriCor 54 mg daily.   Also showing anemia likely secondary to chronic  Disease, started taking iron supplements once daily .
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
## 2018-05-07 ENCOUNTER — OFFICE VISIT (OUTPATIENT)
Dept: PSYCHIATRY | Facility: CLINIC | Age: 58
End: 2018-05-07
Payer: COMMERCIAL

## 2018-05-07 DIAGNOSIS — F41.8 ANXIETY ASSOCIATED WITH DEPRESSION: ICD-10-CM

## 2018-05-07 DIAGNOSIS — F33.9 MAJOR DEPRESSION, RECURRENT, CHRONIC: Primary | ICD-10-CM

## 2018-05-07 PROCEDURE — 90792 PSYCH DIAG EVAL W/MED SRVCS: CPT | Mod: S$GLB,,, | Performed by: PSYCHIATRY & NEUROLOGY

## 2018-05-07 RX ORDER — HYDROXYZINE HYDROCHLORIDE 50 MG/1
TABLET, FILM COATED ORAL
Qty: 120 TABLET | Refills: 2 | Status: SHIPPED | OUTPATIENT
Start: 2018-05-07 | End: 2019-03-28

## 2018-05-07 RX ORDER — VENLAFAXINE HYDROCHLORIDE 150 MG/1
300 CAPSULE, EXTENDED RELEASE ORAL DAILY
Qty: 60 CAPSULE | Refills: 2 | Status: SHIPPED | OUTPATIENT
Start: 2018-05-07 | End: 2018-08-08 | Stop reason: ALTCHOICE

## 2018-05-16 ENCOUNTER — TELEPHONE (OUTPATIENT)
Dept: RADIOLOGY | Facility: HOSPITAL | Age: 58
End: 2018-05-16

## 2018-06-01 DIAGNOSIS — F33.9 MAJOR DEPRESSION, RECURRENT, CHRONIC: ICD-10-CM

## 2018-06-01 RX ORDER — VENLAFAXINE 100 MG/1
100 TABLET ORAL 2 TIMES DAILY
Qty: 60 TABLET | Refills: 3 | OUTPATIENT
Start: 2018-06-01 | End: 2019-06-01

## 2018-06-03 PROBLEM — F41.8 ANXIETY ASSOCIATED WITH DEPRESSION: Status: ACTIVE | Noted: 2018-06-03

## 2018-06-05 ENCOUNTER — PATIENT OUTREACH (OUTPATIENT)
Dept: ADMINISTRATIVE | Facility: HOSPITAL | Age: 58
End: 2018-06-05

## 2018-06-17 ENCOUNTER — PATIENT MESSAGE (OUTPATIENT)
Dept: INTERNAL MEDICINE | Facility: CLINIC | Age: 58
End: 2018-06-17

## 2018-06-18 RX ORDER — INSULIN ASPART 100 [IU]/ML
10-13 INJECTION, SOLUTION INTRAVENOUS; SUBCUTANEOUS
Qty: 15 ML | Refills: 3 | Status: SHIPPED | OUTPATIENT
Start: 2018-06-18 | End: 2019-03-28 | Stop reason: SDUPTHER
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
## 2018-08-08 ENCOUNTER — OFFICE VISIT (OUTPATIENT)
Dept: PSYCHIATRY | Facility: CLINIC | Age: 58
End: 2018-08-08
Payer: COMMERCIAL

## 2018-08-08 DIAGNOSIS — F41.8 ANXIETY ASSOCIATED WITH DEPRESSION: ICD-10-CM

## 2018-08-08 DIAGNOSIS — F33.9 MAJOR DEPRESSION, RECURRENT, CHRONIC: Primary | ICD-10-CM

## 2018-08-08 PROCEDURE — 99214 OFFICE O/P EST MOD 30 MIN: CPT | Mod: S$GLB,,, | Performed by: PSYCHIATRY & NEUROLOGY

## 2018-08-08 PROCEDURE — 99999 PR PBB SHADOW E&M-EST. PATIENT-LVL I: CPT | Mod: PBBFAC,,, | Performed by: PSYCHIATRY & NEUROLOGY

## 2018-08-08 RX ORDER — VENLAFAXINE HYDROCHLORIDE 75 MG/1
CAPSULE, EXTENDED RELEASE ORAL
Qty: 18 CAPSULE | Refills: 0 | Status: SHIPPED | OUTPATIENT
Start: 2018-08-08 | End: 2019-03-28

## 2018-08-08 RX ORDER — TRAZODONE HYDROCHLORIDE 100 MG/1
TABLET ORAL
Qty: 30 TABLET | Refills: 2 | Status: SHIPPED | OUTPATIENT
Start: 2018-08-08 | End: 2018-10-05 | Stop reason: SDUPTHER

## 2018-08-08 RX ORDER — SERTRALINE HYDROCHLORIDE 100 MG/1
TABLET, FILM COATED ORAL
Qty: 30 TABLET | Refills: 2 | Status: SHIPPED | OUTPATIENT
Start: 2018-08-08 | End: 2018-10-05 | Stop reason: SDUPTHER

## 2018-09-28 ENCOUNTER — TELEPHONE (OUTPATIENT)
Dept: ADMINISTRATIVE | Facility: HOSPITAL | Age: 58
End: 2018-09-28

## 2018-09-28 NOTE — TELEPHONE ENCOUNTER
PER DR. MALLOY([facility]) Please advise patient to start take aspirin 81 mg daily or check to see whether she has been taking it but is not included in the medication list.

## 2018-10-05 RX ORDER — SERTRALINE HYDROCHLORIDE 100 MG/1
TABLET, FILM COATED ORAL
Qty: 30 TABLET | Refills: 1 | Status: SHIPPED | OUTPATIENT
Start: 2018-11-05 | End: 2019-01-25 | Stop reason: SDUPTHER

## 2018-10-05 RX ORDER — TRAZODONE HYDROCHLORIDE 100 MG/1
TABLET ORAL
Qty: 30 TABLET | Refills: 1 | Status: SHIPPED | OUTPATIENT
Start: 2018-11-05 | End: 2019-01-25 | Stop reason: SDUPTHER

## 2018-10-19 DIAGNOSIS — E11.9 TYPE 2 DIABETES MELLITUS WITHOUT COMPLICATION: ICD-10-CM

## 2018-10-31 DIAGNOSIS — F33.9 MAJOR DEPRESSION, RECURRENT, CHRONIC: ICD-10-CM

## 2018-10-31 RX ORDER — ESCITALOPRAM OXALATE 20 MG/1
20 TABLET ORAL DAILY
Qty: 30 TABLET | Refills: 3 | OUTPATIENT
Start: 2018-10-31 | End: 2019-10-31

## 2018-12-12 ENCOUNTER — PATIENT OUTREACH (OUTPATIENT)
Dept: ADMINISTRATIVE | Facility: HOSPITAL | Age: 58
End: 2018-12-12

## 2018-12-12 NOTE — PROGRESS NOTES
SPOKE WITH PT RE SCHEDULING APPT WITH DR. MALLOY.F/U DM/HTN. APPT SCHEDULED 12/19/19. INSTRUCTED PT ON APPT. PT VERBALIZED UNDERSTANDING.

## 2019-01-25 RX ORDER — TRAZODONE HYDROCHLORIDE 100 MG/1
TABLET ORAL
Qty: 30 TABLET | Refills: 1 | Status: SHIPPED | OUTPATIENT
Start: 2019-01-25 | End: 2019-04-10 | Stop reason: SDUPTHER

## 2019-01-25 RX ORDER — SERTRALINE HYDROCHLORIDE 100 MG/1
TABLET, FILM COATED ORAL
Qty: 30 TABLET | Refills: 1 | Status: SHIPPED | OUTPATIENT
Start: 2019-01-25 | End: 2019-04-10 | Stop reason: SDUPTHER

## 2019-03-28 ENCOUNTER — LAB VISIT (OUTPATIENT)
Dept: LAB | Facility: HOSPITAL | Age: 59
End: 2019-03-28
Attending: FAMILY MEDICINE
Payer: COMMERCIAL

## 2019-03-28 ENCOUNTER — OFFICE VISIT (OUTPATIENT)
Dept: INTERNAL MEDICINE | Facility: CLINIC | Age: 59
End: 2019-03-28
Payer: COMMERCIAL

## 2019-03-28 ENCOUNTER — TELEPHONE (OUTPATIENT)
Dept: RADIOLOGY | Facility: HOSPITAL | Age: 59
End: 2019-03-28

## 2019-03-28 VITALS
TEMPERATURE: 97 F | WEIGHT: 118.38 LBS | HEIGHT: 61 IN | OXYGEN SATURATION: 99 % | SYSTOLIC BLOOD PRESSURE: 122 MMHG | DIASTOLIC BLOOD PRESSURE: 82 MMHG | HEART RATE: 74 BPM | BODY MASS INDEX: 22.35 KG/M2

## 2019-03-28 DIAGNOSIS — E78.2 HYPERLIPIDEMIA, MIXED: ICD-10-CM

## 2019-03-28 DIAGNOSIS — Z78.9 STATIN INTOLERANCE: ICD-10-CM

## 2019-03-28 DIAGNOSIS — Z79.4 TYPE 2 DIABETES MELLITUS WITH HYPERGLYCEMIA, WITH LONG-TERM CURRENT USE OF INSULIN: ICD-10-CM

## 2019-03-28 DIAGNOSIS — I10 ESSENTIAL HYPERTENSION: ICD-10-CM

## 2019-03-28 DIAGNOSIS — F33.9 MAJOR DEPRESSION, RECURRENT, CHRONIC: ICD-10-CM

## 2019-03-28 DIAGNOSIS — Z00.00 ROUTINE GENERAL MEDICAL EXAMINATION AT A HEALTH CARE FACILITY: Primary | ICD-10-CM

## 2019-03-28 DIAGNOSIS — F41.8 ANXIETY ASSOCIATED WITH DEPRESSION: ICD-10-CM

## 2019-03-28 DIAGNOSIS — E11.65 TYPE 2 DIABETES MELLITUS WITH HYPERGLYCEMIA, WITH LONG-TERM CURRENT USE OF INSULIN: ICD-10-CM

## 2019-03-28 DIAGNOSIS — D50.0 IRON DEFICIENCY ANEMIA DUE TO CHRONIC BLOOD LOSS: ICD-10-CM

## 2019-03-28 DIAGNOSIS — R82.90 ABNORMAL URINE FINDING: Primary | ICD-10-CM

## 2019-03-28 DIAGNOSIS — E04.2 GOITER, NONTOXIC, MULTINODULAR: ICD-10-CM

## 2019-03-28 DIAGNOSIS — Z00.00 ROUTINE GENERAL MEDICAL EXAMINATION AT A HEALTH CARE FACILITY: ICD-10-CM

## 2019-03-28 LAB
ALBUMIN SERPL BCP-MCNC: 3.6 G/DL (ref 3.5–5.2)
ALBUMIN/CREAT UR: 9 UG/MG (ref 0–30)
ALP SERPL-CCNC: 95 U/L (ref 55–135)
ALT SERPL W/O P-5'-P-CCNC: 14 U/L (ref 10–44)
ANION GAP SERPL CALC-SCNC: 9 MMOL/L (ref 8–16)
AST SERPL-CCNC: 14 U/L (ref 10–40)
BACTERIA #/AREA URNS HPF: ABNORMAL /HPF
BASOPHILS # BLD AUTO: 0.07 K/UL (ref 0–0.2)
BASOPHILS NFR BLD: 1.3 % (ref 0–1.9)
BILIRUB SERPL-MCNC: 0.4 MG/DL (ref 0.1–1)
BILIRUB UR QL STRIP: NEGATIVE
BUN SERPL-MCNC: 16 MG/DL (ref 6–20)
CALCIUM SERPL-MCNC: 9.9 MG/DL (ref 8.7–10.5)
CHLORIDE SERPL-SCNC: 101 MMOL/L (ref 95–110)
CHOLEST SERPL-MCNC: 294 MG/DL (ref 120–199)
CHOLEST/HDLC SERPL: 5.2 {RATIO} (ref 2–5)
CLARITY UR: ABNORMAL
CO2 SERPL-SCNC: 28 MMOL/L (ref 23–29)
COLOR UR: YELLOW
CREAT SERPL-MCNC: 0.8 MG/DL (ref 0.5–1.4)
CREAT UR-MCNC: 111 MG/DL (ref 15–325)
DIFFERENTIAL METHOD: ABNORMAL
EOSINOPHIL # BLD AUTO: 0.3 K/UL (ref 0–0.5)
EOSINOPHIL NFR BLD: 5.1 % (ref 0–8)
ERYTHROCYTE [DISTWIDTH] IN BLOOD BY AUTOMATED COUNT: 15.7 % (ref 11.5–14.5)
EST. GFR  (AFRICAN AMERICAN): >60 ML/MIN/1.73 M^2
EST. GFR  (NON AFRICAN AMERICAN): >60 ML/MIN/1.73 M^2
ESTIMATED AVG GLUCOSE: ABNORMAL MG/DL (ref 68–131)
GLUCOSE SERPL-MCNC: 213 MG/DL (ref 70–110)
GLUCOSE UR QL STRIP: ABNORMAL
HBA1C MFR BLD HPLC: >14 % (ref 4–5.6)
HCT VFR BLD AUTO: 35.7 % (ref 37–48.5)
HDLC SERPL-MCNC: 57 MG/DL (ref 40–75)
HDLC SERPL: 19.4 % (ref 20–50)
HGB BLD-MCNC: 12.1 G/DL (ref 12–16)
HGB UR QL STRIP: NEGATIVE
IMM GRANULOCYTES # BLD AUTO: 0.01 K/UL (ref 0–0.04)
IMM GRANULOCYTES NFR BLD AUTO: 0.2 % (ref 0–0.5)
KETONES UR QL STRIP: NEGATIVE
LDLC SERPL CALC-MCNC: 204 MG/DL (ref 63–159)
LEUKOCYTE ESTERASE UR QL STRIP: NEGATIVE
LYMPHOCYTES # BLD AUTO: 2.3 K/UL (ref 1–4.8)
LYMPHOCYTES NFR BLD: 40.8 % (ref 18–48)
MCH RBC QN AUTO: 24.2 PG (ref 27–31)
MCHC RBC AUTO-ENTMCNC: 33.9 G/DL (ref 32–36)
MCV RBC AUTO: 71 FL (ref 82–98)
MICROALBUMIN UR DL<=1MG/L-MCNC: 10 UG/ML
MICROSCOPIC COMMENT: ABNORMAL
MONOCYTES # BLD AUTO: 0.4 K/UL (ref 0.3–1)
MONOCYTES NFR BLD: 6.5 % (ref 4–15)
NEUTROPHILS # BLD AUTO: 2.6 K/UL (ref 1.8–7.7)
NEUTROPHILS NFR BLD: 46.1 % (ref 38–73)
NITRITE UR QL STRIP: POSITIVE
NONHDLC SERPL-MCNC: 237 MG/DL
NRBC BLD-RTO: 0 /100 WBC
PH UR STRIP: 6 [PH] (ref 5–8)
PLATELET # BLD AUTO: 334 K/UL (ref 150–350)
PMV BLD AUTO: 10.2 FL (ref 9.2–12.9)
POTASSIUM SERPL-SCNC: 4.3 MMOL/L (ref 3.5–5.1)
PROT SERPL-MCNC: 7.6 G/DL (ref 6–8.4)
PROT UR QL STRIP: NEGATIVE
RBC # BLD AUTO: 5.01 M/UL (ref 4–5.4)
SODIUM SERPL-SCNC: 138 MMOL/L (ref 136–145)
SP GR UR STRIP: 1.01 (ref 1–1.03)
SQUAMOUS #/AREA URNS HPF: 15 /HPF
TRIGL SERPL-MCNC: 165 MG/DL (ref 30–150)
TSH SERPL DL<=0.005 MIU/L-ACNC: 1.77 UIU/ML (ref 0.4–4)
URN SPEC COLLECT METH UR: ABNORMAL
WBC # BLD AUTO: 5.52 K/UL (ref 3.9–12.7)
WBC #/AREA URNS HPF: 4 /HPF (ref 0–5)

## 2019-03-28 PROCEDURE — 87086 URINE CULTURE/COLONY COUNT: CPT

## 2019-03-28 PROCEDURE — 87088 URINE BACTERIA CULTURE: CPT

## 2019-03-28 PROCEDURE — 3074F SYST BP LT 130 MM HG: CPT | Mod: CPTII,S$GLB,, | Performed by: FAMILY MEDICINE

## 2019-03-28 PROCEDURE — 99396 PR PREVENTIVE VISIT,EST,40-64: ICD-10-PCS | Mod: S$GLB,,, | Performed by: FAMILY MEDICINE

## 2019-03-28 PROCEDURE — 81000 URINALYSIS NONAUTO W/SCOPE: CPT

## 2019-03-28 PROCEDURE — 99999 PR PBB SHADOW E&M-EST. PATIENT-LVL IV: ICD-10-PCS | Mod: PBBFAC,,, | Performed by: FAMILY MEDICINE

## 2019-03-28 PROCEDURE — 83036 HEMOGLOBIN GLYCOSYLATED A1C: CPT

## 2019-03-28 PROCEDURE — 3079F DIAST BP 80-89 MM HG: CPT | Mod: CPTII,S$GLB,, | Performed by: FAMILY MEDICINE

## 2019-03-28 PROCEDURE — 99999 PR PBB SHADOW E&M-EST. PATIENT-LVL IV: CPT | Mod: PBBFAC,,, | Performed by: FAMILY MEDICINE

## 2019-03-28 PROCEDURE — 80061 LIPID PANEL: CPT

## 2019-03-28 PROCEDURE — 3079F PR MOST RECENT DIASTOLIC BLOOD PRESSURE 80-89 MM HG: ICD-10-PCS | Mod: CPTII,S$GLB,, | Performed by: FAMILY MEDICINE

## 2019-03-28 PROCEDURE — 3074F PR MOST RECENT SYSTOLIC BLOOD PRESSURE < 130 MM HG: ICD-10-PCS | Mod: CPTII,S$GLB,, | Performed by: FAMILY MEDICINE

## 2019-03-28 PROCEDURE — 36415 COLL VENOUS BLD VENIPUNCTURE: CPT

## 2019-03-28 PROCEDURE — 84443 ASSAY THYROID STIM HORMONE: CPT

## 2019-03-28 PROCEDURE — 85025 COMPLETE CBC W/AUTO DIFF WBC: CPT

## 2019-03-28 PROCEDURE — 82043 UR ALBUMIN QUANTITATIVE: CPT

## 2019-03-28 PROCEDURE — 99396 PREV VISIT EST AGE 40-64: CPT | Mod: S$GLB,,, | Performed by: FAMILY MEDICINE

## 2019-03-28 PROCEDURE — 80053 COMPREHEN METABOLIC PANEL: CPT

## 2019-03-28 RX ORDER — INSULIN ASPART 100 [IU]/ML
10-13 INJECTION, SOLUTION INTRAVENOUS; SUBCUTANEOUS
Qty: 15 ML | Refills: 3 | Status: SHIPPED | OUTPATIENT
Start: 2019-03-28 | End: 2019-12-06 | Stop reason: SDUPTHER

## 2019-03-28 RX ORDER — INSULIN PUMP SYRINGE, 3 ML
EACH MISCELLANEOUS
Qty: 1 EACH | Refills: 0 | Status: SHIPPED | OUTPATIENT
Start: 2019-03-28 | End: 2021-01-11

## 2019-03-28 RX ORDER — LISINOPRIL 10 MG/1
10 TABLET ORAL DAILY
Qty: 90 TABLET | Refills: 3 | Status: SHIPPED | OUTPATIENT
Start: 2019-03-28 | End: 2020-09-21 | Stop reason: SDUPTHER

## 2019-03-28 RX ORDER — LANCETS
EACH MISCELLANEOUS
Qty: 100 EACH | Refills: 11 | Status: SHIPPED | OUTPATIENT
Start: 2019-03-28 | End: 2020-09-21 | Stop reason: SDUPTHER

## 2019-03-28 RX ORDER — INSULIN GLARGINE 100 [IU]/ML
34 INJECTION, SOLUTION SUBCUTANEOUS NIGHTLY
Qty: 15 ML | Refills: 3 | Status: SHIPPED | OUTPATIENT
Start: 2019-03-28 | End: 2019-07-08 | Stop reason: SDUPTHER

## 2019-03-28 NOTE — PROGRESS NOTES
Subjective:       Patient ID: Leesa Le is a 59 y.o. female.    Chief Complaint: Annual Exam    59-year-old  female patient with Patient Active Problem List:     Type 2 diabetes mellitus with hyperglycemia, with long-term current use of insulin     Goiter, nontoxic, multinodular     Statin intolerance     Hyperlipidemia, mixed     Abnormal ECG     Major depression, recurrent, chronic     Essential hypertension     Non compliance w medication regimen     Iron deficiency anemia     Anxiety associated with depression  Here for routine annual physicals.  Patient reported that she is currently taking Zoloft and trazodone for anxiety and depression has been stable.   Patient took last dose of insulin last night and requesting refills, currently not checking her blood glucose levels, requesting diabetic test supplies.   Patient has discontinued lisinopril and cholesterol medications.   Reports that she started with  just recently and will be exercising 3 days a week  Denies any extreme fatigue, chest pain or difficulty breathing, polyuria polydipsia polyphagia, tingling or numbness sensation to extremities  Patient due for eye exam and can see only [facility] secondary to her insurance  Due for mammogram and Pap smear  Has been taking iron supplements once daily    Review of Systems   Constitutional: Negative for fatigue.   Eyes: Negative for visual disturbance.   Respiratory: Negative for shortness of breath.    Cardiovascular: Negative for chest pain and leg swelling.   Gastrointestinal: Negative for abdominal pain, nausea and vomiting.   Endocrine: Negative for polydipsia, polyphagia and polyuria.   Musculoskeletal: Negative for myalgias.   Skin: Negative for rash.   Neurological: Negative for weakness, light-headedness, numbness and headaches.   Psychiatric/Behavioral: Positive for dysphoric mood. Negative for sleep disturbance. The patient is nervous/anxious.       "    /82   Pulse 74   Temp 97.3 °F (36.3 °C) (Tympanic)   Ht 5' 1" (1.549 m)   Wt 53.7 kg (118 lb 6.2 oz)   LMP 06/23/2015   SpO2 99%   BMI 22.37 kg/m²   Objective:      Physical Exam   Constitutional: She is oriented to person, place, and time. She appears well-developed and well-nourished.   HENT:   Head: Normocephalic and atraumatic.   Mouth/Throat: Oropharynx is clear and moist.   Cardiovascular: Normal rate, regular rhythm and normal heart sounds.   No murmur heard.  Pulses:       Dorsalis pedis pulses are 2+ on the right side, and 2+ on the left side.   Pulmonary/Chest: Effort normal and breath sounds normal. She has no wheezes.   Abdominal: Soft. Bowel sounds are normal. There is no tenderness.   Musculoskeletal: She exhibits no edema or tenderness.   Feet:   Right Foot:   Protective Sensation: 10 sites tested. 10 sites sensed.   Skin Integrity: Negative for callus or dry skin.   Left Foot:   Protective Sensation: 10 sites tested. 10 sites sensed.   Skin Integrity: Negative for callus or dry skin.   Neurological: She is alert and oriented to person, place, and time.   Skin: Skin is warm and dry. No rash noted.   Psychiatric: She has a normal mood and affect.         Assessment:       1. Routine general medical examination at a health care facility    2. Essential hypertension    3. Hyperlipidemia, mixed    4. Type 2 diabetes mellitus with hyperglycemia, with long-term current use of insulin    5. Statin intolerance    6. Anxiety associated with depression    7. Major depression, recurrent, chronic    8. Goiter, nontoxic, multinodular    9. Iron deficiency anemia due to chronic blood loss        Plan:   Routine general medical examination at a health care facility  -     CBC auto differential; Future; Expected date: 03/28/2019  -     Comprehensive metabolic panel; Future; Expected date: 03/28/2019  -     Lipid panel; Future; Expected date: 03/28/2019  -     TSH; Future; Expected date: 03/28/2019  - "     Mammo Digital Screening Bilat; Future; Expected date: 03/28/2019  -     Ambulatory Referral to Gynecology  Vital signs stable today.  Clinical exam stable.  Encouraged to start lifestyle modifications with low-fat and low-cholesterol diet and exercise 30 min daily  Due for Pap smear and mammogram  Refuses further vaccinations    Essential hypertension  -     Comprehensive metabolic panel; Future; Expected date: 03/28/2019  -     Lipid panel; Future; Expected date: 03/28/2019  -     TSH; Future; Expected date: 03/28/2019  -     Urinalysis; Future; Expected date: 03/28/2019  -     lisinopril 10 MG tablet; Take 1 tablet (10 mg total) by mouth once daily.  Dispense: 90 tablet; Refill: 3  Blood pressure is stable today, Will start on lisinopril 10 mg secondary to renal protection with kidneys    Hyperlipidemia, mixed  Statin intolerance  -     Lipid panel; Future; Expected date: 03/28/2019  Patient statin intolerance  Was previously taking fenofibrate and Zetia but currently not taking any of the cholesterol medication, will check fasting labs    Type 2 diabetes mellitus with hyperglycemia, with long-term current use of insulin  -     Comprehensive metabolic panel; Future; Expected date: 03/28/2019  -     Lipid panel; Future; Expected date: 03/28/2019  -     Hemoglobin A1c; Future; Expected date: 03/28/2019  -     Microalbumin/creatinine urine ratio; Future; Expected date: 03/28/2019  -     NOVOLOG FLEXPEN U-100 INSULIN 100 unit/mL InPn pen; Inject 10-13 Units into the skin 3 (three) times daily with meals. As per SSI  Dispense: 15 mL; Refill: 3  -     insulin glargine 100 units/mL (3mL) SubQ pen; Inject 34 Units into the skin every evening.  Dispense: 15 mL; Refill: 3  -     blood-glucose meter kit; To check BG 3 times daily, to use with insurance preferred meter  Dispense: 1 each; Refill: 0  -     lancets Misc; To check BG 3 times daily, to use with insurance preferred meter  Dispense: 100 each; Refill: 11  -      blood sugar diagnostic Strp; To check BG 3 times daily, to use with insurance preferred meter  Dispense: 100 each; Refill: 11  -     lisinopril 10 MG tablet; Take 1 tablet (10 mg total) by mouth once daily.  Dispense: 90 tablet; Refill: 3  Previous A1c was severely uncontrolled  Refill given on Lantus 34 units to be taken in the evening and NovoLog 10 units with 3 meals daily  Will check fasting labs  Patient was encouraged to make an eye appointment with [facility]  Diabetic foot exam stable today  Strict lifestyle changes recommended with 1800 ADA low-fat and low-cholesterol diet and exercise 30 min daily  Encouraged to start monitoring blood glucose levels closely    Anxiety associated with depression  Major depression, recurrent, chronic  Stable on Zoloft 100 mg daily and trazodone 100 mg in the evening    Goiter, nontoxic, multinodular  Stable and asymptomatic  Will plan to repeat thyroid ultrasound    Iron deficiency anemia due to chronic blood loss  Taking iron supplements daily

## 2019-03-29 ENCOUNTER — PATIENT MESSAGE (OUTPATIENT)
Dept: INTERNAL MEDICINE | Facility: CLINIC | Age: 59
End: 2019-03-29

## 2019-03-29 DIAGNOSIS — E11.65 TYPE 2 DIABETES MELLITUS WITH HYPERGLYCEMIA, WITH LONG-TERM CURRENT USE OF INSULIN: Primary | ICD-10-CM

## 2019-03-29 DIAGNOSIS — Z79.4 TYPE 2 DIABETES MELLITUS WITH HYPERGLYCEMIA, WITH LONG-TERM CURRENT USE OF INSULIN: Primary | ICD-10-CM

## 2019-03-29 DIAGNOSIS — E78.2 HYPERLIPIDEMIA, MIXED: ICD-10-CM

## 2019-03-29 DIAGNOSIS — N39.0 URINARY TRACT INFECTION WITHOUT HEMATURIA, SITE UNSPECIFIED: ICD-10-CM

## 2019-03-29 RX ORDER — SULFAMETHOXAZOLE AND TRIMETHOPRIM 800; 160 MG/1; MG/1
1 TABLET ORAL 2 TIMES DAILY
Qty: 14 TABLET | Refills: 0 | Status: SHIPPED | OUTPATIENT
Start: 2019-03-29 | End: 2019-04-05

## 2019-03-29 RX ORDER — EZETIMIBE 10 MG/1
10 TABLET ORAL DAILY
Qty: 90 TABLET | Refills: 3 | Status: SHIPPED | OUTPATIENT
Start: 2019-03-29 | End: 2020-09-21

## 2019-03-30 LAB — BACTERIA UR CULT: NORMAL

## 2019-04-10 RX ORDER — SERTRALINE HYDROCHLORIDE 100 MG/1
TABLET, FILM COATED ORAL
Qty: 30 TABLET | Refills: 1 | Status: SHIPPED | OUTPATIENT
Start: 2019-04-10 | End: 2020-09-21

## 2019-04-10 RX ORDER — TRAZODONE HYDROCHLORIDE 100 MG/1
TABLET ORAL
Qty: 30 TABLET | Refills: 1 | Status: SHIPPED | OUTPATIENT
Start: 2019-04-10 | End: 2020-09-21

## 2019-04-11 ENCOUNTER — HOSPITAL ENCOUNTER (OUTPATIENT)
Dept: RADIOLOGY | Facility: HOSPITAL | Age: 59
Discharge: HOME | End: 2019-04-11
Attending: FAMILY MEDICINE
Payer: COMMERCIAL

## 2019-04-11 DIAGNOSIS — E04.2 GOITER, NONTOXIC, MULTINODULAR: ICD-10-CM

## 2019-04-11 PROCEDURE — 76536 US EXAM OF HEAD AND NECK: CPT | Mod: TC

## 2019-04-11 PROCEDURE — 76536 US EXAM OF HEAD AND NECK: CPT | Mod: 26,,, | Performed by: RADIOLOGY

## 2019-04-11 PROCEDURE — 76536 US SOFT TISSUE HEAD NECK THYROID: ICD-10-PCS | Mod: 26,,, | Performed by: RADIOLOGY

## 2019-04-12 DIAGNOSIS — E04.2 MULTINODULAR GOITER: Primary | ICD-10-CM

## 2019-04-23 ENCOUNTER — HOSPITAL ENCOUNTER (OUTPATIENT)
Dept: RADIOLOGY | Facility: HOSPITAL | Age: 59
Discharge: HOME | End: 2019-04-23
Attending: FAMILY MEDICINE
Payer: COMMERCIAL

## 2019-04-23 VITALS — HEIGHT: 61 IN | BODY MASS INDEX: 22.28 KG/M2 | WEIGHT: 118 LBS

## 2019-04-23 DIAGNOSIS — Z00.00 ROUTINE GENERAL MEDICAL EXAMINATION AT A HEALTH CARE FACILITY: ICD-10-CM

## 2019-04-23 PROCEDURE — 77067 SCR MAMMO BI INCL CAD: CPT | Mod: TC

## 2019-04-23 PROCEDURE — 77067 SCR MAMMO BI INCL CAD: CPT | Mod: 26,,, | Performed by: RADIOLOGY

## 2019-04-23 PROCEDURE — 77063 BREAST TOMOSYNTHESIS BI: CPT | Mod: 26,,, | Performed by: RADIOLOGY

## 2019-04-23 PROCEDURE — 77063 MAMMO DIGITAL SCREENING BILAT WITH TOMOSYNTHESIS_CAD: ICD-10-PCS | Mod: 26,,, | Performed by: RADIOLOGY

## 2019-04-23 PROCEDURE — 77067 MAMMO DIGITAL SCREENING BILAT WITH TOMOSYNTHESIS_CAD: ICD-10-PCS | Mod: 26,,, | Performed by: RADIOLOGY

## 2019-04-29 ENCOUNTER — OFFICE VISIT (OUTPATIENT)
Dept: ENDOCRINOLOGY | Facility: CLINIC | Age: 59
End: 2019-04-29
Payer: COMMERCIAL

## 2019-04-29 ENCOUNTER — LAB VISIT (OUTPATIENT)
Dept: LAB | Facility: HOSPITAL | Age: 59
End: 2019-04-29
Attending: INTERNAL MEDICINE
Payer: COMMERCIAL

## 2019-04-29 VITALS
SYSTOLIC BLOOD PRESSURE: 124 MMHG | HEIGHT: 61 IN | WEIGHT: 129.44 LBS | DIASTOLIC BLOOD PRESSURE: 76 MMHG | HEART RATE: 96 BPM | BODY MASS INDEX: 24.44 KG/M2

## 2019-04-29 DIAGNOSIS — E04.2 MULTIPLE THYROID NODULES: Primary | ICD-10-CM

## 2019-04-29 DIAGNOSIS — E04.2 MULTIPLE THYROID NODULES: ICD-10-CM

## 2019-04-29 DIAGNOSIS — Z98.890 HISTORY OF THYROID SURGERY: ICD-10-CM

## 2019-04-29 PROCEDURE — 3008F BODY MASS INDEX DOCD: CPT | Mod: CPTII,S$GLB,, | Performed by: INTERNAL MEDICINE

## 2019-04-29 PROCEDURE — 99999 PR PBB SHADOW E&M-EST. PATIENT-LVL IV: CPT | Mod: PBBFAC,,, | Performed by: INTERNAL MEDICINE

## 2019-04-29 PROCEDURE — 84439 ASSAY OF FREE THYROXINE: CPT

## 2019-04-29 PROCEDURE — 3078F DIAST BP <80 MM HG: CPT | Mod: CPTII,S$GLB,, | Performed by: INTERNAL MEDICINE

## 2019-04-29 PROCEDURE — 99999 PR PBB SHADOW E&M-EST. PATIENT-LVL IV: ICD-10-PCS | Mod: PBBFAC,,, | Performed by: INTERNAL MEDICINE

## 2019-04-29 PROCEDURE — 3074F PR MOST RECENT SYSTOLIC BLOOD PRESSURE < 130 MM HG: ICD-10-PCS | Mod: CPTII,S$GLB,, | Performed by: INTERNAL MEDICINE

## 2019-04-29 PROCEDURE — 84480 ASSAY TRIIODOTHYRONINE (T3): CPT

## 2019-04-29 PROCEDURE — 36415 COLL VENOUS BLD VENIPUNCTURE: CPT

## 2019-04-29 PROCEDURE — 99204 OFFICE O/P NEW MOD 45 MIN: CPT | Mod: S$GLB,,, | Performed by: INTERNAL MEDICINE

## 2019-04-29 PROCEDURE — 3008F PR BODY MASS INDEX (BMI) DOCUMENTED: ICD-10-PCS | Mod: CPTII,S$GLB,, | Performed by: INTERNAL MEDICINE

## 2019-04-29 PROCEDURE — 99204 PR OFFICE/OUTPT VISIT, NEW, LEVL IV, 45-59 MIN: ICD-10-PCS | Mod: S$GLB,,, | Performed by: INTERNAL MEDICINE

## 2019-04-29 PROCEDURE — 84443 ASSAY THYROID STIM HORMONE: CPT

## 2019-04-29 PROCEDURE — 3074F SYST BP LT 130 MM HG: CPT | Mod: CPTII,S$GLB,, | Performed by: INTERNAL MEDICINE

## 2019-04-29 PROCEDURE — 3078F PR MOST RECENT DIASTOLIC BLOOD PRESSURE < 80 MM HG: ICD-10-PCS | Mod: CPTII,S$GLB,, | Performed by: INTERNAL MEDICINE

## 2019-04-29 NOTE — LETTER
April 29, 2019      Rosangela Cat MD  [address] [place] [address]           [facility]  [address] [facility] [address]  Phone: [phone]  Fax: [phone]          Patient: Leesa Le   MR Number: [id number]   YOB: 1960   Date of Visit: 4/29/2019       Dear Dr. Rosangela Cat:    Thank you for referring Leesa Le to me for evaluation. Attached you will find relevant portions of my assessment and plan of care.    If you have questions, please do not hesitate to call me. I look forward to following Leesa Le along with you.    Sincerely,    Kourtney Chacon MD    Enclosure  CC:  No Recipients    If you would like to receive this communication electronically, please contact [email or web address] or [phone] to request more information on [address] Link access.    For providers and/or their staff who would like to refer a patient to [facility], please contact us through our one-stop-shop provider referral line, [facility], at [phone].    If you feel you have received this communication in error or would no longer like to receive these types of communications, please e-mail [email or web address]

## 2019-04-30 LAB
T3 SERPL-MCNC: 74 NG/DL (ref 60–180)
T4 FREE SERPL-MCNC: 0.91 NG/DL (ref 0.71–1.51)
TSH SERPL DL<=0.005 MIU/L-ACNC: 0.81 UIU/ML (ref 0.4–4)

## 2019-05-07 ENCOUNTER — PATIENT OUTREACH (OUTPATIENT)
Dept: ADMINISTRATIVE | Facility: HOSPITAL | Age: 59
End: 2019-05-07

## 2019-05-23 ENCOUNTER — OFFICE VISIT (OUTPATIENT)
Dept: OTOLARYNGOLOGY | Facility: CLINIC | Age: 59
End: 2019-05-23
Payer: COMMERCIAL

## 2019-05-23 VITALS
TEMPERATURE: 97 F | WEIGHT: 124.56 LBS | BODY MASS INDEX: 23.54 KG/M2 | DIASTOLIC BLOOD PRESSURE: 77 MMHG | SYSTOLIC BLOOD PRESSURE: 142 MMHG | HEART RATE: 81 BPM

## 2019-05-23 DIAGNOSIS — E89.0 HISTORY OF PARTIAL THYROIDECTOMY: ICD-10-CM

## 2019-05-23 DIAGNOSIS — E04.2 NONTOXIC MULTINODULAR GOITER: Primary | ICD-10-CM

## 2019-05-23 PROCEDURE — 10005 FNA BX W/US GDN 1ST LES: CPT | Mod: S$GLB,,, | Performed by: OTOLARYNGOLOGY

## 2019-05-23 PROCEDURE — 10005 PR FINE NEEDLE ASP BIOPSY, W/US GUIDANCE, 1ST LESION: ICD-10-PCS | Mod: S$GLB,,, | Performed by: OTOLARYNGOLOGY

## 2019-05-23 PROCEDURE — 99244 OFF/OP CNSLTJ NEW/EST MOD 40: CPT | Mod: 25,S$GLB,, | Performed by: OTOLARYNGOLOGY

## 2019-05-23 PROCEDURE — 99999 PR PBB SHADOW E&M-EST. PATIENT-LVL II: CPT | Mod: PBBFAC,,, | Performed by: OTOLARYNGOLOGY

## 2019-05-23 PROCEDURE — 88173 CYTOPATH EVAL FNA REPORT: CPT | Mod: 26,,, | Performed by: PATHOLOGY

## 2019-05-23 PROCEDURE — 99244 PR OFFICE CONSULTATION,LEVEL IV: ICD-10-PCS | Mod: 25,S$GLB,, | Performed by: OTOLARYNGOLOGY

## 2019-05-23 PROCEDURE — 88173 CYTOLOGY SPECIMEN-FNA NON-RADIOLOGY CLINICIAN PERFORMED W/O ON SITE: ICD-10-PCS | Mod: 26,,, | Performed by: PATHOLOGY

## 2019-05-23 PROCEDURE — 88173 CYTOPATH EVAL FNA REPORT: CPT | Performed by: PATHOLOGY

## 2019-05-23 PROCEDURE — 99999 PR PBB SHADOW E&M-EST. PATIENT-LVL II: ICD-10-PCS | Mod: PBBFAC,,, | Performed by: OTOLARYNGOLOGY

## 2019-05-23 NOTE — LETTER
May 23, 2019      Kourtney Chacon MD  [address]  [address]  [facility]  [place] [address]           [facility]  [address] [facility]  [place] [address]  Phone: [phone]  Fax: [phone]          Patient: Leesa Le   MR Number: [id number]   YOB: 1960   Date of Visit: 5/23/2019       Dear Dr. Kourtney Chacon:    Thank you for referring Leesa Le to me for evaluation. Attached you will find relevant portions of my assessment and plan of care.    If you have questions, please do not hesitate to call me. I look forward to following Leesa Le along with you.    Sincerely,    Gasper Francisco MD    Enclosure  CC:  No Recipients    If you would like to receive this communication electronically, please contact [email or web address] or [phone] to request more information on Capt[employer] Link access.    For providers and/or their staff who would like to refer a patient to [facility], please contact us through our one-stop-shop provider referral line, [facility], at [phone].    If you feel you have received this communication in error or would no longer like to receive these types of communications, please e-mail [email or web address]

## 2019-05-23 NOTE — PROGRESS NOTES
Referring Provider:    Kourtney Chacon Md  [address]  [address]  [facility] [place] [address]  Subjective:   Patient: Leesa Le [id number], :1960   Visit date:2019 3:53 PM    Chief Complaint:  Other (FNA in office)    HPI:  Leesa is a 59 y.o. female who I was asked to see in consultation for evaluation of the following issue(s):    Pt states 90% of her thyroid was removed > 20 years ago bc the thyroid was overactive.  Consultation and biopsy requested by Dr. Yasmin Chacon.    COMPRESSIVE SYMPTOMS OR MINOR RISK FACTORS FOR THYROID CANCER:  Increasing in size over the past 6 months:  No  Dysphagia: occasional  Dyspnea on exertion:  No  Orthopnea:  No  Hemoptysis:  No  Voice changes:  No  Pain:  No  AGE >45  Yes   FEMALE Yes    HIGH RISK HISTORY FOR THYROID CANCER:  Thyroid cancer in 1 or more first degree relatives:  No  History of radiation:  No  Prior thyroidectomy with dx of thyroid cancer:  No  PET positive nodule:  No  Multiple Endocrine Neoplasia:  No  Familial Medullary Thyroid Cancer:  No    (2009 REVISED AMERICAN THYROID ASSOCIATION MANAGEMENT GUIDELINES FOR PATIENTS WITH THYROID NODULES AND DIFFERENTIATED THYROID CANCER)      Lab Results   Component Value Date    TSH 0.805 2019    TSH 1.770 2019    TSH 1.191 2018    TSH 0.663 2017    TSH 1.480 2017    CALCIUM 9.9 2019    CALCIUM 9.3 2018    CALCIUM 9.1 2017    CALCIUM 9.7 2017    CALCIUM 9.4 2017           Review of Systems:  Negative unless checked off.  Gen:  ?fever   ?fatigue  HENT:  ?nosebleeds  ?dental problem   Eyes:  ?photophobia  ?visual disturbance  Resp:  ?chest tightness ?wheezing  Card:  ?chest pain  ?leg swelling  GI:  ?abdominal pain ?blood in stool  :  ?dysuria  ?hematuria  Musc:  ?joint swelling  ?gait problem  Skin:  ?color change  ?pallor  Neuro:  ?seizures  ?numbness  Hem:  ?bruise/bleed easily  Psych:   ?hallucinations  ?behavioral problems  Allergy/Imm: is allergic to penicillins and statins-hmg-coa reductase inhibitors.    Her meds, allergies, medical, surgical, social & family histories were reviewed & updated:  -     She has a current medication list which includes the following prescription(s): bd ultra-fine mini pen needle, blood sugar diagnostic, blood-glucose meter, ezetimibe, fenofibrate, ferrous sulfate, insulin, pen needle, diabetic, lancets, lisinopril, novolog flexpen u-100 insulin, sertraline, and trazodone.  -     She  has a past medical history of Abnormal Pap smear of vagina, Arthritis, Diabetes mellitus type II, Hyperlipidemia, Hypertension, and Thyroid disease.   -     She does not have any pertinent problems on file.   -     She  has a past surgical history that includes Appendectomy; Thyroidectomy, partial; Tubal ligation; Tumor excision; Cardiac catheterization; Mouth floor mass excision (Bilateral); Colonoscopy (N/A, 6/23/2017); and Breast biopsy (Right).  -     She  reports that she quit smoking about 12 years ago. Her smoking use included cigarettes. She has a 8.00 pack-year smoking history. She has never used smokeless tobacco. She reports that she drinks about 12.6 oz of alcohol per week. She reports that she does not use drugs.  -     Her family history includes Alzheimer's disease in her father; Cancer in her maternal aunt; Colon polyps in her maternal uncle; Diabetes in her maternal grandmother and paternal grandmother; Ulcers in her mother.  -     She is allergic to penicillins and statins-hmg-coa reductase inhibitors.    Objective:   Physical Exam:  Vitals:  BP (!) 142/77   Pulse 81   Temp 97.3 °F (36.3 °C) (Tympanic)   Wt 56.5 kg (124 lb 9 oz)   LMP 06/23/2015   BMI 23.54 kg/m²   General appearance:  Well developed, well nourished    Eyes:  Extraocular motions intact, PERRL    Communication:  no hoarseness, no dysphonia    Ears:  Otoscopy of external auditory canals and  tympanic membranes was normal, clinical speech reception thresholds grossly intact, no mass/lesion of auricle.  Nose:  No masses/lesions of external nose, nasal mucosa, septum, and turbinates were within normal limits.  Mouth:  No mass/lesion of lips, teeth, gums, hard/soft palate, tongue, tonsils, or oropharynx.    Cardiovascular:  No pedal edema; Radial Pulses +2     Neck & Lymphatics:  No cervical lymphadenopathy, no neck mass/crepitus/ asymmetry, trachea is midline, no thyroid enlargement/tenderness/mass.    Psych: Oriented x3,  Alert with normal mood and affect.     Respiration/Chest:  Symmetric expansion during respiration, normal respiratory effort.    Skin:  Warm and intact. No ulcerations of face, scalp, neck.      ULTRASOUND:  Results for orders placed during the hospital encounter of 04/11/19   US Soft Tissue Head Neck Thyroid    Narrative EXAMINATION:  US SOFT TISSUE HEAD NECK THYROID    CLINICAL HISTORY:  Nontoxic multinodular goiter    TECHNIQUE:  Ultrasound of the thyroid and cervical lymph nodes was performed.    COMPARISON:  05/15/2017    FINDINGS:  The thyroid gland is heterogeneous in echotexture.  The isthmus measures 2.1 mm in thickness.  The right lobe of the thyroid gland measures 5.6 cm in length.  The left lobe of the thyroid gland measures 2.3 cm in length.    The 3 largest nodules within the right lobe of the thyroid gland are measured and measure 1.7 x 1.5 x 1.5 cm within the upper pole, 2.5 x 2.1 x 2.8 cm within the midpole and 2.1 x 1.3 x 2.2 cm within the lower pole.  All of these nodules appear to be predominantly solid with a few cystic spaces.  The nodules are thought to be unchanged in appearance and size.  Please note that the difference in size within the lower pole of the right lobe nodule is thought to be secondary to differences in image acquisition rather than true growth.      Impression Essentially stable exam      Electronically signed by: Luis Cat  DO  Date:    04/12/2019  Time:    08:24           PATHOLOGY:  FINAL PATHOLOGIC DIAGNOSIS  1. Right upper thyroid, FNA:  [facility] Thyroid Cytology Category: Benign  2. Right mid thyroid, FNA:  [facility] Thyroid Cytology Category: Benign  3. Right lower thyroid, FNA:  [facility] Thyroid Cytology Category: Benign  [facility]  Diagnosed by: Thompson Hodges M.D.  (Electronically Signed: 2015-09-09 16:21:21)  Assessment & Plan:   Leesa was seen today for other.    Diagnoses and all orders for this visit:    Nontoxic multinodular goiter  -     Cytology Specimen-FNA NON-Radiology Clinician Performed Without On Site Pathologist Adequacy    History of partial thyroidectomy  -     Cytology Specimen-FNA NON-Radiology Clinician Performed Without On Site Pathologist Adequacy      Leesa has presents with a complex thyroid problem.  The imaging and laboratory values were reviewed at length.  Leesa does not have compressive symptoms or cosmetic deformity from the size of the mass.  Leesa does not have a HIGH RISK HISTORY for thyroid cancer.      Multiinstitutional Analysis of Thyroid Nodule Risk Stratification Using the American College of Radiology Thyroid Imaging Reporting and Data System    ?  TR1 (0 POINTS): BENIGN  o no FNA required; risk of malignancy 0.3%  ? TR2  (2 POINTS): NOT SUSPICIOUS  o No FNA required; risk of malignancy 1.5%  ? TR3  (3 POINTS): MILDLY SUSPICIOUS; risk of malignancy 4.8%  o ?2.5 cm FNA  o ?1.5 cm follow up, follow up: 1, 3 and 5 years  ? TR4  (4-6 POINTS): MODERATELY SUSPICIOUS; risk of malignancy 9.1%  o ?1.5 cm FNA  o ?1.0 cm follow up, follow up: 1, 2, 3 and 5 years  ? TR5  (7 OR MORE POINTS):  HIGHLY SUSPICIOUS; risk of malignancy 35%  o ?1.0 cm FNA  o ?0.5 cm follow up, annual follow up for up to 5 years          Bilateral nodules: No  Nodules 3cm or greater: No    Based on a lengthy discussion of treatment options and the above information, my recommendation is US guided biopsy of  right mid lobe nodule measuring 2.5x2.8cm.              Procedure: Ultrasound-guided FNA of right thyroid nodule    Clinical History:    thyroid nodule(s)    Technique/findings: After the risks, benefits and options of the planned procedure were explained to the patient in full detail, the patient expressed complete understanding and gave signed informed consent.  The skin overlying this area was prepped and draped in the usual sterile fashion. A timeout was performed. 1% lidocaine was used as a local anesthetic.  The mass/nodule was visualized with ultrasound and each needle was visualized to enter the intended biopsy site. Each nodule had 3 passes with 25 gauge needles and 1 22 gauge needle.  These were placed separately onto slides.    Locations biopsied:  1. Right mid lobe nodule measuring 2.5 x 2.1 x 2.8 cm       Post procedure ultrasound fail to demonstrate evidence for a post procedure hemorrhage or other complication. A sterile dressing was applied.  The patient tolerated the procedure well without complication comment departing the ultrasound suite in unchanged condition.   Impression       1. Successful ultrasound-guided FNA.      Gasper Francisco    05/23/2019   3:59 PM

## 2019-05-27 ENCOUNTER — TELEPHONE (OUTPATIENT)
Dept: OTOLARYNGOLOGY | Facility: CLINIC | Age: 59
End: 2019-05-27

## 2019-05-27 DIAGNOSIS — E04.2 MULTIPLE THYROID NODULES: Primary | ICD-10-CM

## 2019-05-27 NOTE — TELEPHONE ENCOUNTER
Called and informed pt of biopsy results and to repeat US in a year.          ----- Message from Lucia Hatch PA-C sent at 5/27/2019  3:06 PM CDT -----  Please let pt know biopsy of her thyroid nodule is benign, we will plan to repeat US in one year. Thank you!

## 2019-06-14 DIAGNOSIS — D50.9 IRON DEFICIENCY ANEMIA, UNSPECIFIED IRON DEFICIENCY ANEMIA TYPE: ICD-10-CM

## 2019-06-14 RX ORDER — PEN NEEDLE, DIABETIC 30 GX3/16"
NEEDLE, DISPOSABLE MISCELLANEOUS
Qty: 100 EACH | Refills: 11 | Status: SHIPPED | OUTPATIENT
Start: 2019-06-14 | End: 2020-09-21 | Stop reason: SDUPTHER

## 2019-06-14 RX ORDER — FERROUS SULFATE 325(65) MG
325 TABLET ORAL DAILY
Qty: 30 TABLET | Refills: 6 | Status: SHIPPED | OUTPATIENT
Start: 2019-06-14 | End: 2020-09-21

## 2019-06-27 RX ORDER — TRAZODONE HYDROCHLORIDE 100 MG/1
TABLET ORAL
Qty: 30 TABLET | Refills: 1 | OUTPATIENT
Start: 2019-06-27

## 2019-06-27 RX ORDER — SERTRALINE HYDROCHLORIDE 100 MG/1
TABLET, FILM COATED ORAL
Qty: 30 TABLET | Refills: 1 | OUTPATIENT
Start: 2019-06-27

## 2019-07-08 DIAGNOSIS — Z79.4 TYPE 2 DIABETES MELLITUS WITH HYPERGLYCEMIA, WITH LONG-TERM CURRENT USE OF INSULIN: ICD-10-CM

## 2019-07-08 DIAGNOSIS — E11.65 TYPE 2 DIABETES MELLITUS WITH HYPERGLYCEMIA, WITH LONG-TERM CURRENT USE OF INSULIN: ICD-10-CM

## 2019-07-08 RX ORDER — INSULIN GLARGINE 100 [IU]/ML
INJECTION, SOLUTION SUBCUTANEOUS
Qty: 15 SYRINGE | Refills: 3 | Status: SHIPPED | OUTPATIENT
Start: 2019-07-08 | End: 2020-09-21 | Stop reason: SDUPTHER

## 2019-12-06 DIAGNOSIS — Z79.4 TYPE 2 DIABETES MELLITUS WITH HYPERGLYCEMIA, WITH LONG-TERM CURRENT USE OF INSULIN: ICD-10-CM

## 2019-12-06 DIAGNOSIS — E11.65 TYPE 2 DIABETES MELLITUS WITH HYPERGLYCEMIA, WITH LONG-TERM CURRENT USE OF INSULIN: ICD-10-CM

## 2019-12-06 RX ORDER — INSULIN ASPART 100 [IU]/ML
12 INJECTION, SOLUTION INTRAVENOUS; SUBCUTANEOUS
Qty: 15 SYRINGE | Refills: 3 | Status: SHIPPED | OUTPATIENT
Start: 2019-12-06 | End: 2020-09-21 | Stop reason: SDUPTHER

## 2019-12-12 ENCOUNTER — PATIENT OUTREACH (OUTPATIENT)
Dept: ADMINISTRATIVE | Facility: HOSPITAL | Age: 59
End: 2019-12-12

## 2019-12-17 ENCOUNTER — PATIENT OUTREACH (OUTPATIENT)
Dept: ADMINISTRATIVE | Facility: HOSPITAL | Age: 59
End: 2019-12-17

## 2019-12-17 NOTE — PROGRESS NOTES
Spoke with pt re scheduling appt with Dr. Emory russo f/u. Appt scheduled 12/31/19. Instructed pt on appt. Pt verbalized understanding

## 2019-12-26 ENCOUNTER — PATIENT OUTREACH (OUTPATIENT)
Dept: ADMINISTRATIVE | Facility: HOSPITAL | Age: 59
End: 2019-12-26

## 2020-01-03 DIAGNOSIS — E11.9 TYPE 2 DIABETES MELLITUS WITHOUT COMPLICATION: ICD-10-CM

## 2020-08-17 ENCOUNTER — PATIENT OUTREACH (OUTPATIENT)
Dept: ADMINISTRATIVE | Facility: HOSPITAL | Age: 60
End: 2020-08-17

## 2020-08-17 DIAGNOSIS — Z12.31 ENCOUNTER FOR SCREENING MAMMOGRAM FOR BREAST CANCER: Primary | ICD-10-CM

## 2020-08-17 NOTE — PROGRESS NOTES
Working the [employer] not seen in 12 months report     Care Everywhere- no records found.  Lab Iván-No records found   Immunizations updated    Assisted to schedule annual physical and mammo. Will come in fasting to do labs same day. Offered to schedule eye exam and she said [facility] did not take her eye insurance. Strongly encouraged to get and eye exam.

## 2020-09-19 ENCOUNTER — PATIENT MESSAGE (OUTPATIENT)
Dept: ADMINISTRATIVE | Facility: OTHER | Age: 60
End: 2020-09-19

## 2020-09-21 ENCOUNTER — OFFICE VISIT (OUTPATIENT)
Dept: INTERNAL MEDICINE | Facility: CLINIC | Age: 60
End: 2020-09-21
Payer: COMMERCIAL

## 2020-09-21 ENCOUNTER — LAB VISIT (OUTPATIENT)
Dept: LAB | Facility: HOSPITAL | Age: 60
End: 2020-09-21
Payer: COMMERCIAL

## 2020-09-21 ENCOUNTER — HOSPITAL ENCOUNTER (OUTPATIENT)
Dept: RADIOLOGY | Facility: HOSPITAL | Age: 60
Discharge: HOME | End: 2020-09-21
Attending: FAMILY MEDICINE
Payer: COMMERCIAL

## 2020-09-21 VITALS
SYSTOLIC BLOOD PRESSURE: 120 MMHG | WEIGHT: 122.13 LBS | HEART RATE: 78 BPM | TEMPERATURE: 97 F | DIASTOLIC BLOOD PRESSURE: 80 MMHG | HEIGHT: 61 IN | OXYGEN SATURATION: 98 % | BODY MASS INDEX: 23.06 KG/M2

## 2020-09-21 DIAGNOSIS — E04.2 MULTIPLE THYROID NODULES: ICD-10-CM

## 2020-09-21 DIAGNOSIS — E04.2 GOITER, NONTOXIC, MULTINODULAR: ICD-10-CM

## 2020-09-21 DIAGNOSIS — Z98.890 HISTORY OF THYROID SURGERY: ICD-10-CM

## 2020-09-21 DIAGNOSIS — F10.10 ALCOHOL ABUSE: ICD-10-CM

## 2020-09-21 DIAGNOSIS — Z00.00 ROUTINE GENERAL MEDICAL EXAMINATION AT A HEALTH CARE FACILITY: Primary | ICD-10-CM

## 2020-09-21 DIAGNOSIS — Z79.4 TYPE 2 DIABETES MELLITUS WITH HYPERGLYCEMIA, WITH LONG-TERM CURRENT USE OF INSULIN: ICD-10-CM

## 2020-09-21 DIAGNOSIS — E11.65 TYPE 2 DIABETES MELLITUS WITH HYPERGLYCEMIA, WITH LONG-TERM CURRENT USE OF INSULIN: ICD-10-CM

## 2020-09-21 DIAGNOSIS — F41.8 ANXIETY ASSOCIATED WITH DEPRESSION: ICD-10-CM

## 2020-09-21 DIAGNOSIS — I15.2 HYPERTENSION ASSOCIATED WITH TYPE 2 DIABETES MELLITUS: ICD-10-CM

## 2020-09-21 DIAGNOSIS — Z91.199 NON COMPLIANCE WITH MEDICAL TREATMENT: ICD-10-CM

## 2020-09-21 DIAGNOSIS — E78.5 HYPERLIPIDEMIA ASSOCIATED WITH TYPE 2 DIABETES MELLITUS: ICD-10-CM

## 2020-09-21 DIAGNOSIS — Z78.9 STATIN INTOLERANCE: ICD-10-CM

## 2020-09-21 DIAGNOSIS — E11.59 HYPERTENSION ASSOCIATED WITH TYPE 2 DIABETES MELLITUS: ICD-10-CM

## 2020-09-21 DIAGNOSIS — F33.9 MAJOR DEPRESSION, RECURRENT, CHRONIC: ICD-10-CM

## 2020-09-21 DIAGNOSIS — Z01.419 WELL WOMAN EXAM: ICD-10-CM

## 2020-09-21 DIAGNOSIS — D50.0 IRON DEFICIENCY ANEMIA DUE TO CHRONIC BLOOD LOSS: ICD-10-CM

## 2020-09-21 DIAGNOSIS — G47.33 OSA (OBSTRUCTIVE SLEEP APNEA): ICD-10-CM

## 2020-09-21 DIAGNOSIS — Z12.31 ENCOUNTER FOR SCREENING MAMMOGRAM FOR BREAST CANCER: ICD-10-CM

## 2020-09-21 DIAGNOSIS — Z00.00 ROUTINE GENERAL MEDICAL EXAMINATION AT A HEALTH CARE FACILITY: ICD-10-CM

## 2020-09-21 DIAGNOSIS — E11.69 HYPERLIPIDEMIA ASSOCIATED WITH TYPE 2 DIABETES MELLITUS: ICD-10-CM

## 2020-09-21 LAB
ALBUMIN/CREAT UR: 12.3 UG/MG (ref 0–30)
BILIRUB UR QL STRIP: NEGATIVE
CLARITY UR: CLEAR
COLOR UR: YELLOW
CREAT UR-MCNC: 122 MG/DL (ref 15–325)
GLUCOSE UR QL STRIP: ABNORMAL
HGB UR QL STRIP: NEGATIVE
KETONES UR QL STRIP: NEGATIVE
LEUKOCYTE ESTERASE UR QL STRIP: NEGATIVE
MICROALBUMIN UR DL<=1MG/L-MCNC: 15 UG/ML
NITRITE UR QL STRIP: NEGATIVE
PH UR STRIP: 6 [PH] (ref 5–8)
PROT UR QL STRIP: NEGATIVE
SP GR UR STRIP: 1.02 (ref 1–1.03)
URN SPEC COLLECT METH UR: ABNORMAL

## 2020-09-21 PROCEDURE — 81003 URINALYSIS AUTO W/O SCOPE: CPT

## 2020-09-21 PROCEDURE — 77067 SCR MAMMO BI INCL CAD: CPT | Mod: TC

## 2020-09-21 PROCEDURE — 99999 PR PBB SHADOW E&M-EST. PATIENT-LVL V: ICD-10-PCS | Mod: PBBFAC,,, | Performed by: FAMILY MEDICINE

## 2020-09-21 PROCEDURE — 77063 MAMMO DIGITAL SCREENING BILAT WITH TOMOSYNTHESIS_CAD: ICD-10-PCS | Mod: 26,,, | Performed by: RADIOLOGY

## 2020-09-21 PROCEDURE — 99396 PR PREVENTIVE VISIT,EST,40-64: ICD-10-PCS | Mod: S$GLB,,, | Performed by: FAMILY MEDICINE

## 2020-09-21 PROCEDURE — 77067 SCR MAMMO BI INCL CAD: CPT | Mod: 26,,, | Performed by: RADIOLOGY

## 2020-09-21 PROCEDURE — 3079F PR MOST RECENT DIASTOLIC BLOOD PRESSURE 80-89 MM HG: ICD-10-PCS | Mod: CPTII,S$GLB,, | Performed by: FAMILY MEDICINE

## 2020-09-21 PROCEDURE — 99999 PR PBB SHADOW E&M-EST. PATIENT-LVL V: CPT | Mod: PBBFAC,,, | Performed by: FAMILY MEDICINE

## 2020-09-21 PROCEDURE — 3074F SYST BP LT 130 MM HG: CPT | Mod: CPTII,S$GLB,, | Performed by: FAMILY MEDICINE

## 2020-09-21 PROCEDURE — 99396 PREV VISIT EST AGE 40-64: CPT | Mod: S$GLB,,, | Performed by: FAMILY MEDICINE

## 2020-09-21 PROCEDURE — 3074F PR MOST RECENT SYSTOLIC BLOOD PRESSURE < 130 MM HG: ICD-10-PCS | Mod: CPTII,S$GLB,, | Performed by: FAMILY MEDICINE

## 2020-09-21 PROCEDURE — 82043 UR ALBUMIN QUANTITATIVE: CPT

## 2020-09-21 PROCEDURE — 3008F BODY MASS INDEX DOCD: CPT | Mod: CPTII,S$GLB,, | Performed by: FAMILY MEDICINE

## 2020-09-21 PROCEDURE — 77067 MAMMO DIGITAL SCREENING BILAT WITH TOMOSYNTHESIS_CAD: ICD-10-PCS | Mod: 26,,, | Performed by: RADIOLOGY

## 2020-09-21 PROCEDURE — 77063 BREAST TOMOSYNTHESIS BI: CPT | Mod: 26,,, | Performed by: RADIOLOGY

## 2020-09-21 PROCEDURE — 3079F DIAST BP 80-89 MM HG: CPT | Mod: CPTII,S$GLB,, | Performed by: FAMILY MEDICINE

## 2020-09-21 PROCEDURE — 3008F PR BODY MASS INDEX (BMI) DOCUMENTED: ICD-10-PCS | Mod: CPTII,S$GLB,, | Performed by: FAMILY MEDICINE

## 2020-09-21 RX ORDER — INSULIN ASPART 100 [IU]/ML
12 INJECTION, SOLUTION INTRAVENOUS; SUBCUTANEOUS
Qty: 15 SYRINGE | Refills: 3 | Status: SHIPPED | OUTPATIENT
Start: 2020-09-21 | End: 2021-07-12 | Stop reason: SDUPTHER

## 2020-09-21 RX ORDER — INSULIN GLARGINE 100 [IU]/ML
INJECTION, SOLUTION SUBCUTANEOUS
Qty: 15 SYRINGE | Refills: 3 | Status: SHIPPED | OUTPATIENT
Start: 2020-09-21 | End: 2020-12-14 | Stop reason: SDUPTHER

## 2020-09-21 RX ORDER — LANCETS
EACH MISCELLANEOUS
Qty: 100 EACH | Refills: 11 | Status: SHIPPED | OUTPATIENT
Start: 2020-09-21

## 2020-09-21 RX ORDER — PEN NEEDLE, DIABETIC 30 GX3/16"
NEEDLE, DISPOSABLE MISCELLANEOUS
Qty: 100 EACH | Refills: 11 | Status: SHIPPED | OUTPATIENT
Start: 2020-09-21 | End: 2022-09-27 | Stop reason: SDUPTHER

## 2020-09-21 RX ORDER — LISINOPRIL 10 MG/1
10 TABLET ORAL DAILY
Qty: 90 TABLET | Refills: 3 | Status: SHIPPED | OUTPATIENT
Start: 2020-09-21 | End: 2021-10-08

## 2020-09-21 NOTE — PROGRESS NOTES
Subjective:       Patient ID: Leesa Le is a 60 y.o. female.    Chief Complaint: Annual Exam    60-year-old female patient with Patient Active Problem List:     Type 2 diabetes mellitus with hyperglycemia, with long-term current use of insulin     Goiter, nontoxic, multinodular     Statin intolerance     Hyperlipidemia associated with type 2 diabetes mellitus     Abnormal ECG     Major depression, recurrent, chronic     Hypertension associated with type 2 diabetes mellitus     Non compliance w medication regimen     Iron deficiency anemia     Anxiety associated with depression     Multiple thyroid nodules     History of thyroid surgery  Here for routine annual physicals and reports that patient has been not monitoring her blood glucose levels regularly, reports her sugars have been fluctuating in the range of 300s to 500s occasionally, has been taking her insulin but has discontinued all the other medications.  Reports that she has been skipping meals, reports that she has been stressed out and has been dependent on alcohol daily.   Denies any suicidal homicidal thoughts, felt worse when taking Zoloft and has discontinued more than a year ago.   Denies any discomfort with swallowing, chest pain shortness of breath or palpitations, tingling or numbness sensation to extremities.   Reports having sleep apnea and was placed on CPAP machine but has lost it couple of years ago, has been having difficulty falling and staying asleep    Review of Systems   Constitutional: Negative for fatigue.   Eyes: Negative for visual disturbance.   Respiratory: Negative for shortness of breath.    Cardiovascular: Negative for chest pain and leg swelling.   Gastrointestinal: Negative for abdominal pain, nausea and vomiting.   Endocrine: Negative for polydipsia and polyuria.   Musculoskeletal: Negative for myalgias.   Skin: Negative for rash.   Neurological: Negative for weakness, light-headedness, numbness and headaches.  "  Psychiatric/Behavioral: Positive for decreased concentration and sleep disturbance. Negative for self-injury and suicidal ideas. The patient is nervous/anxious.          /80 (BP Location: Right arm, Patient Position: Sitting, BP Method: Small (Manual))   Pulse 78   Temp 97.2 °F (36.2 °C) (Tympanic)   Ht 5' 1" (1.549 m)   Wt 55.4 kg (122 lb 2.2 oz)   LMP 06/23/2015   SpO2 98%   BMI 23.08 kg/m²   Objective:      Physical Exam  Constitutional:       Appearance: She is well-developed.   HENT:      Head: Normocephalic and atraumatic.   Cardiovascular:      Rate and Rhythm: Normal rate and regular rhythm.      Pulses:           Dorsalis pedis pulses are 1+ on the right side and 1+ on the left side.      Heart sounds: Normal heart sounds. No murmur.   Pulmonary:      Effort: Pulmonary effort is normal.      Breath sounds: Normal breath sounds. No wheezing.   Abdominal:      General: Bowel sounds are normal.      Palpations: Abdomen is soft.      Tenderness: There is no abdominal tenderness.   Feet:      Right foot:      Protective Sensation: 10 sites tested. 10 sites sensed.      Skin integrity: Skin integrity normal.      Left foot:      Protective Sensation: 10 sites tested. 10 sites sensed.      Skin integrity: Skin integrity normal.   Skin:     General: Skin is warm and dry.      Findings: No rash.   Neurological:      Mental Status: She is alert and oriented to person, place, and time.   Psychiatric:         Mood and Affect: Mood normal.           Assessment/Plan:   1. Routine general medical examination at a health care facility  - CBC auto differential; Future  - Comprehensive metabolic panel; Future  - Lipid Panel; Future  - TSH; Future  - Urinalysis; Future  - HIV 1/2 Ag/Ab (4th Gen); Future  Vital signs stable today.  Clinical exam stable  Continue lifestyle modifications with low-fat and low-cholesterol diet and exercise 30 min daily  Refuses further vaccinations    2. Hypertension associated with " "type 2 diabetes mellitus  - Comprehensive metabolic panel; Future  - Lipid Panel; Future  - TSH; Future  - Urinalysis; Future  - lisinopriL 10 MG tablet; Take 1 tablet (10 mg total) by mouth once daily.  Dispense: 90 tablet; Refill: 3  Blood pressure is stable, will start on lisinopril 10 mg secondary to diabetes for renal protection    3. Hyperlipidemia associated with type 2 diabetes mellitus  5. Statin intolerance   Lipid Panel; Future  Unable to take statins in the past and has not been taking fenofibrate  Strict lifestyle changes recommended with low-fat and low-cholesterol diet  Will check fasting labs    4. Type 2 diabetes mellitus with hyperglycemia, with long-term current use of insulin  - Comprehensive metabolic panel; Future  - Lipid Panel; Future  - Hemoglobin A1C; Future  - Microalbumin/creatinine urine ratio; Future  - Ambulatory referral/consult to Diabetic Advanced Practice Providers (Medical Management); Future  - insulin (BASAGLAR KWIKPEN U-100 INSULIN) glargine 100 units/mL (3mL) SubQ pen; INJECT 34 UNITS INTO THE SKIN EVERY EVENING.  Dispense: 15 Syringe; Refill: 3  - blood sugar diagnostic Strp; To check BG 3 times daily, to use with insurance preferred meter  Dispense: 100 each; Refill: 11  - pen needle, diabetic (BD ULTRA-FINE MINI PEN NEEDLE) 31 gauge x 3/16" Ndle; To take insulin daily  Dispense: 100 each; Refill: 11  - lancets Misc; To check BG 3 times daily, to use with insurance preferred meter  Dispense: 100 each; Refill: 11  - insulin aspart U-100 (NOVOLOG FLEXPEN U-100 INSULIN) 100 unit/mL (3 mL) InPn pen; Inject 12 Units into the skin 3 (three) times daily with meals. As per SSI  Dispense: 15 Syringe; Refill: 3  - lisinopriL 10 MG tablet; Take 1 tablet (10 mg total) by mouth once daily.  Dispense: 90 tablet; Refill: 3  Patient has been noncompliant with taking her medications in the past  Refills will be given on NovoLog insulin as well as Basaglar insulin  Strict lifestyle changes " recommended with 1800 ADA low-fat and low-cholesterol diet and exercise 30 min daily  Diabetic test supplies has been given to patient today and medication compliance discussed in we detail  Will refer to diabetes clinic for close monitoring and management of blood glucose levels, severely uncontrolled A1c in the past    6. Multiple thyroid nodules  - TSH; Future  7. History of thyroid surgery  - TSH; Future  8. Goiter, nontoxic, multinodular  Stable and asymptomatic    9. Major depression, recurrent, chronic  - Ambulatory referral/consult to Psychology; Future  10. Anxiety associated with depression  - Ambulatory referral/consult to Psychology; Future  Currently not taking any medication-Zoloft which has not been helpful in the past and made her feel sick  Encouraged to avoid alcohol intake  Will refer to Psychology for counseling    11. Iron deficiency anemia due to chronic blood loss  - CBC auto differential; Future  Will check labs    12. Alcohol abuse  - Comprehensive metabolic panel; Future  - Ambulatory referral/consult to Psychology; Future  Encouraged to quit alcohol intake and will refer to Psychology for counseling    13. Non compliance with medical treatment  - Ambulatory referral/consult to Diabetic Advanced Practice Providers (Medical Management); Future    14. GENTRY (obstructive sleep apnea)  - Ambulatory referral/consult to Pulmonology; Future  Reports having CPAP machine in the past and has lost it, will refer to pulmonology for further evaluation    15. Well woman exam  - Ambulatory referral/consult to Gynecology; Future   Due for Pap smear
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
## 2020-09-22 ENCOUNTER — TELEPHONE (OUTPATIENT)
Dept: PULMONOLOGY | Facility: CLINIC | Age: 60
End: 2020-09-22

## 2020-09-22 ENCOUNTER — OFFICE VISIT (OUTPATIENT)
Dept: PULMONOLOGY | Facility: CLINIC | Age: 60
End: 2020-09-22
Payer: COMMERCIAL

## 2020-09-22 ENCOUNTER — OFFICE VISIT (OUTPATIENT)
Dept: OBSTETRICS AND GYNECOLOGY | Facility: CLINIC | Age: 60
End: 2020-09-22
Payer: COMMERCIAL

## 2020-09-22 VITALS
HEIGHT: 61 IN | WEIGHT: 122.56 LBS | RESPIRATION RATE: 18 BRPM | BODY MASS INDEX: 23.14 KG/M2 | HEART RATE: 88 BPM | SYSTOLIC BLOOD PRESSURE: 122 MMHG | DIASTOLIC BLOOD PRESSURE: 80 MMHG | OXYGEN SATURATION: 99 %

## 2020-09-22 VITALS
BODY MASS INDEX: 23.14 KG/M2 | HEIGHT: 61 IN | SYSTOLIC BLOOD PRESSURE: 128 MMHG | WEIGHT: 122.56 LBS | DIASTOLIC BLOOD PRESSURE: 84 MMHG

## 2020-09-22 DIAGNOSIS — R53.83 FEELING TIRED: ICD-10-CM

## 2020-09-22 DIAGNOSIS — Z01.419 ENCOUNTER FOR GYNECOLOGICAL EXAMINATION WITHOUT ABNORMAL FINDING: Primary | ICD-10-CM

## 2020-09-22 DIAGNOSIS — E04.2 MULTIPLE THYROID NODULES: ICD-10-CM

## 2020-09-22 DIAGNOSIS — F33.9 MAJOR DEPRESSION, RECURRENT, CHRONIC: ICD-10-CM

## 2020-09-22 DIAGNOSIS — G47.33 OSA (OBSTRUCTIVE SLEEP APNEA): Primary | ICD-10-CM

## 2020-09-22 DIAGNOSIS — D50.0 IRON DEFICIENCY ANEMIA DUE TO CHRONIC BLOOD LOSS: ICD-10-CM

## 2020-09-22 DIAGNOSIS — F43.10 PTSD (POST-TRAUMATIC STRESS DISORDER): ICD-10-CM

## 2020-09-22 DIAGNOSIS — E11.69 HYPERLIPIDEMIA ASSOCIATED WITH TYPE 2 DIABETES MELLITUS: ICD-10-CM

## 2020-09-22 DIAGNOSIS — F41.8 ANXIETY ASSOCIATED WITH DEPRESSION: ICD-10-CM

## 2020-09-22 DIAGNOSIS — E78.5 HYPERLIPIDEMIA ASSOCIATED WITH TYPE 2 DIABETES MELLITUS: ICD-10-CM

## 2020-09-22 DIAGNOSIS — Z79.4 TYPE 2 DIABETES MELLITUS WITH HYPERGLYCEMIA, WITH LONG-TERM CURRENT USE OF INSULIN: ICD-10-CM

## 2020-09-22 DIAGNOSIS — E11.59 HYPERTENSION ASSOCIATED WITH TYPE 2 DIABETES MELLITUS: ICD-10-CM

## 2020-09-22 DIAGNOSIS — E11.65 TYPE 2 DIABETES MELLITUS WITH HYPERGLYCEMIA, WITH LONG-TERM CURRENT USE OF INSULIN: ICD-10-CM

## 2020-09-22 DIAGNOSIS — Z01.419 WELL WOMAN EXAM: ICD-10-CM

## 2020-09-22 DIAGNOSIS — F51.04 PSYCHOPHYSIOLOGICAL INSOMNIA: ICD-10-CM

## 2020-09-22 DIAGNOSIS — I15.2 HYPERTENSION ASSOCIATED WITH TYPE 2 DIABETES MELLITUS: ICD-10-CM

## 2020-09-22 DIAGNOSIS — R06.83 SNORING: ICD-10-CM

## 2020-09-22 DIAGNOSIS — R40.0 DAYTIME SLEEPINESS: ICD-10-CM

## 2020-09-22 PROCEDURE — 3079F PR MOST RECENT DIASTOLIC BLOOD PRESSURE 80-89 MM HG: ICD-10-PCS | Mod: CPTII,S$GLB,, | Performed by: NURSE PRACTITIONER

## 2020-09-22 PROCEDURE — 99386 PREV VISIT NEW AGE 40-64: CPT | Mod: S$GLB,,, | Performed by: NURSE PRACTITIONER

## 2020-09-22 PROCEDURE — 3008F BODY MASS INDEX DOCD: CPT | Mod: CPTII,S$GLB,, | Performed by: NURSE PRACTITIONER

## 2020-09-22 PROCEDURE — 3046F HEMOGLOBIN A1C LEVEL >9.0%: CPT | Mod: CPTII,S$GLB,, | Performed by: NURSE PRACTITIONER

## 2020-09-22 PROCEDURE — 3079F DIAST BP 80-89 MM HG: CPT | Mod: CPTII,S$GLB,, | Performed by: NURSE PRACTITIONER

## 2020-09-22 PROCEDURE — 99999 PR PBB SHADOW E&M-EST. PATIENT-LVL III: CPT | Mod: PBBFAC,,, | Performed by: NURSE PRACTITIONER

## 2020-09-22 PROCEDURE — 99999 PR PBB SHADOW E&M-EST. PATIENT-LVL IV: CPT | Mod: PBBFAC,,, | Performed by: NURSE PRACTITIONER

## 2020-09-22 PROCEDURE — 99204 OFFICE O/P NEW MOD 45 MIN: CPT | Mod: S$GLB,,, | Performed by: NURSE PRACTITIONER

## 2020-09-22 PROCEDURE — 87624 HPV HI-RISK TYP POOLED RSLT: CPT

## 2020-09-22 PROCEDURE — 3074F SYST BP LT 130 MM HG: CPT | Mod: CPTII,S$GLB,, | Performed by: NURSE PRACTITIONER

## 2020-09-22 PROCEDURE — 3074F PR MOST RECENT SYSTOLIC BLOOD PRESSURE < 130 MM HG: ICD-10-PCS | Mod: CPTII,S$GLB,, | Performed by: NURSE PRACTITIONER

## 2020-09-22 PROCEDURE — 3008F PR BODY MASS INDEX (BMI) DOCUMENTED: ICD-10-PCS | Mod: CPTII,S$GLB,, | Performed by: NURSE PRACTITIONER

## 2020-09-22 PROCEDURE — 99999 PR PBB SHADOW E&M-EST. PATIENT-LVL III: ICD-10-PCS | Mod: PBBFAC,,, | Performed by: NURSE PRACTITIONER

## 2020-09-22 PROCEDURE — 3046F PR MOST RECENT HEMOGLOBIN A1C LEVEL > 9.0%: ICD-10-PCS | Mod: CPTII,S$GLB,, | Performed by: NURSE PRACTITIONER

## 2020-09-22 PROCEDURE — 99999 PR PBB SHADOW E&M-EST. PATIENT-LVL IV: ICD-10-PCS | Mod: PBBFAC,,, | Performed by: NURSE PRACTITIONER

## 2020-09-22 PROCEDURE — 99386 PR PREVENTIVE VISIT,NEW,40-64: ICD-10-PCS | Mod: S$GLB,,, | Performed by: NURSE PRACTITIONER

## 2020-09-22 PROCEDURE — 99204 PR OFFICE/OUTPT VISIT, NEW, LEVL IV, 45-59 MIN: ICD-10-PCS | Mod: S$GLB,,, | Performed by: NURSE PRACTITIONER

## 2020-09-22 PROCEDURE — 88175 CYTOPATH C/V AUTO FLUID REDO: CPT

## 2020-09-22 NOTE — PATIENT INSTRUCTIONS

## 2020-09-22 NOTE — PATIENT INSTRUCTIONS
What Are Snoring and Obstructive Sleep Apnea?  If youve ever had a stuffed-up nose, you know the feeling of trying to breathe through a very narrow passageway. This is what happens in your throat when you snore. While you sleep, structures in your throat partially block your air passage, making the passage narrow and hard to breathe through. If the entire passage becomes blocked and you cant breathe at all, you have sleep apnea.      Snoring Obstructive sleep apnea   Snoring  If your throat structures are too large or the muscles relax too much during sleep, the air passage may be partially blocked. As air from the nose or mouth passes around this blockage, the throat structures vibrate, causing the familiar sound of snoring. At times, this sound can be so loud that snorers wake up others, or even themselves, during the night. Snoring gets worse as more and more of the air passage is blocked.  Obstructive sleep apnea  If the structures completely block the throat, air cant flow to the lungs at all. This is called apnea (meaning no breathing). Since the lungs arent getting fresh air, the brain tells the body to wake up just enough to tighten the muscles and unblock the air passage. With a loud gasp, breathing begins again. This process may be repeated over and over again throughout the night, making your sleep fragmented with a lighter stage of sleep. Even though you do not remember waking up many times during the night to a lighter sleep, you feel tired the next day. The lack of sleep and fresh air can also strain your lungs, heart, and other organs, leading to problems such as high blood pressure, heart attack, or stroke.  Problems in the nose and jaw  Problems in the structure of the nose may obstruct breathing. A crooked (deviated) septum or swollen turbinates can make snoring worse or lead to apnea. Also, a receding jaw may make the tongue sit too far back, so its more likely to block the airway when  youre asleep.        Date Last Reviewed: 7/18/2015  © 6660-3068 [employer]. [address] [place] [address]. All rights reserved. This information is not intended as a substitute for professional medical care. Always follow your healthcare professional's instructions.        Continuous Positive Air Pressure (CPAP)     A mask over the nose gently directs air into the throat to keep the airway open.   Continuous positive air pressure (CPAP) uses gentle air pressure to hold the airway open. CPAP is often the most effective treatment for sleep apnea and severe snoring. It works very well for many people. But keep in mind that it can take several adjustments before the setup is right for you.  How CPAP works  The CPAP machine  is a small portable pump beside the bed. The pump sends air through a hose, which is held over your nose and mouth by a mask. Mild air pressure is gently pushed through your airway. The air pressure nudges sagging tissues aside. This widens the airway so you can breathe better. CPAP may be combined with other kinds of therapy for sleep apnea.  Types of air pressure treatments  There are different types of CPAP. Your doctor or CPAP technician will help you decide which type is best for you:  · Basic CPAP keeps the pressure constant all night long.  · A bilevel device (BiPAP) provides more pressure when you breathe in and less when you breathe out. A BiPAP machine also may be set to provide automatic breaths to maintain breathing if you stop breathing while sleeping.  · An autoCPAP device automatically adjusts pressure throughout the night and in response to changes such as body position, sleep stage, and snoring.  Date Last Reviewed: 8/10/2015  © 3387-1235 [employer]. [address] [place], [place] [address]. All rights reserved. This information is not intended as a substitute for professional medical care. Always follow your healthcare professional's  instructions.

## 2020-09-22 NOTE — ASSESSMENT & PLAN NOTE
Not currently on medication, symptoms, encouraged considering reestablishing care with psychiatry.

## 2020-09-22 NOTE — ASSESSMENT & PLAN NOTE
Diagnosis by psychiatry in about 2017, no longer following with psychiatry. Not currently on medication, symptoms, encouraged considering reestablishing care with psychiatry.

## 2020-09-22 NOTE — TELEPHONE ENCOUNTER
----- Message from Jyotsna Julio sent at 9/22/2020  9:11 AM CDT -----  [facility]

## 2020-09-22 NOTE — PROGRESS NOTES
CC: Well woman exam    Leesa Le is a 60 y.o. female  presents for well woman exam.  LMP: Patient's last menstrual period was 2015..  No issues, problems, or complaints.    Last seen in 2016 - mmg done today.     Past Medical History:   Diagnosis Date    Abnormal Pap smear of vagina     Arthritis     Left shoulder    Diabetes mellitus type II     Dr Ayala manages DM     Hyperlipidemia     Hypertension     Thyroid disease     Dr Torres marrioneaux- [facility]     Past Surgical History:   Procedure Laterality Date    APPENDECTOMY      BREAST BIOPSY Right     about 5 yrs ago    CARDIAC CATHETERIZATION      COLONOSCOPY N/A 2017    Procedure: COLONOSCOPY;  Surgeon: Buzz Snyder MD;  Location: [facility];  Service: Endoscopy;  Laterality: N/A;    MOUTH FLOOR MASS EXCISION Bilateral     Lower jaw bones , Bilateral    THYROIDECTOMY, PARTIAL      TUBAL LIGATION      TUMOR EXCISION      mouth     Social History     Socioeconomic History    Marital status:      Spouse name: Not on file    Number of children: 3    Years of education: Not on file    Highest education level: Not on file   Occupational History     Employer: [employer]   Social Needs    Financial resource strain: Not on file    Food insecurity     Worry: Not on file     Inability: Not on file    Transportation needs     Medical: Not on file     Non-medical: Not on file   Tobacco Use    Smoking status: Former Smoker     Packs/day: 1.00     Years: 15.00     Pack years: 15.00     Types: Cigarettes     Start date:      Quit date: 2007     Years since quittin.7    Smokeless tobacco: Never Used    Tobacco comment: smoked off and on from [id number] about 15 years total years smoking.   Substance and Sexual Activity    Alcohol use: Yes     Alcohol/week: 21.0 standard drinks     Types: 21 Glasses of wine per week    Drug use: No    Sexual activity: Yes     Partners: Male   Lifestyle     "Physical activity     Days per week: Not on file     Minutes per session: Not on file    Stress: Not on file   Relationships    Social connections     Talks on phone: Not on file     Gets together: Not on file     Attends Temple service: Not on file     Active member of club or organization: Not on file     Attends meetings of clubs or organizations: Not on file     Relationship status: Not on file   Other Topics Concern    Not on file   Social History Narrative    , 3 kids, [employer].     Family History   Problem Relation Age of Onset    Ulcers Mother     Alzheimer's disease Father     Cancer Maternal Aunt         bone, lung    Diabetes Maternal Grandmother     Diabetes Paternal Grandmother     Colon polyps Maternal Uncle      OB History        7    Para   7    Term   4       3    AB        Living   3       SAB        TAB        Ectopic        Multiple        Live Births                     /84   Ht 5' 1" (1.549 m)   Wt 55.6 kg (122 lb 9.2 oz)   LMP 2015   BMI 23.16 kg/m²       ROS:  GENERAL: Denies weight gain or weight loss. Feeling well overall.   SKIN: Denies rash or lesions.   HEAD: Denies head injury or headache.   NODES: Denies enlarged lymph nodes.   CHEST: Denies chest pain or shortness of breath.   CARDIOVASCULAR: Denies palpitations or left sided chest pain.   ABDOMEN: No abdominal pain, constipation, diarrhea, nausea, vomiting or rectal bleeding.   URINARY: No frequency, dysuria, hematuria, or burning on urination.  REPRODUCTIVE: See HPI.   BREASTS: The patient performs breast self-examination and denies pain, lumps, or nipple discharge.   HEMATOLOGIC: No easy bruisability or excessive bleeding.   MUSCULOSKELETAL: Denies joint pain or swelling.   NEUROLOGIC: Denies syncope or weakness.   PSYCHIATRIC: Denies depression, anxiety or mood swings.    PHYSICAL EXAM:  APPEARANCE: Well nourished, well developed, in no acute distress.  AFFECT: WNL, alert and oriented x " 3  SKIN: No acne or hirsutism  NECK: Neck symmetric without masses or thyromegaly  NODES: No inguinal, cervical, axillary, or femoral lymph node enlargement  CHEST: Good respiratory effect  ABDOMEN: Soft.  No tenderness or masses.  No hepatosplenomegaly.  No hernias.  BREASTS: Symmetrical, no skin changes or visible lesions.  No palpable masses, nipple discharge bilaterally.  PELVIC: Normal external genitalia without lesions.  Normal hair distribution.  Adequate perineal body, normal urethral meatus.  Vagina moist and well rugated without lesions or discharge.  Cervix pink, without lesions, discharge or tenderness.  No significant cystocele or rectocele.  Bimanual exam shows uterus to be normal size, regular, mobile and nontender.  Adnexa without masses or tenderness.    EXTREMITIES: No edema.  Physical Exam    1. Encounter for gynecological examination without abnormal finding  Liquid-Based Pap Smear, Screening    HPV High Risk Genotypes, PCR   2. Well woman exam  Ambulatory referral/consult to Gynecology    AND PLAN:    Patient was counseled today on A.C.S. Pap guidelines and recommendations for yearly pelvic exams, mammograms and monthly self breast exams; to see her PCP for other health maintenance.
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
## 2020-09-22 NOTE — LETTER
September 22, 2020      Rosangela Cat MD  [address] [place] [address]           [facility]  [address] [facility] [facility] [place] [address]  Phone: [phone]  Fax: [phone]          Patient: Leesa Le   MR Number: [id number]   YOB: 1960   Date of Visit: 9/22/2020       Dear Dr. Rosangela Cat:    Thank you for referring Leesa Le to me for evaluation. Attached you will find relevant portions of my assessment and plan of care.    If you have questions, please do not hesitate to call me. I look forward to following Leesa Le along with you.    Sincerely,    Conchis Andre, NP    Enclosure  CC:  No Recipients    If you would like to receive this communication electronically, please contact [email or web address] or [phone] to request more information on [employer] Link access.    For providers and/or their staff who would like to refer a patient to [facility], please contact us through our one-stop-shop provider referral line, [facility], at [phone].    If you feel you have received this communication in error or would no longer like to receive these types of communications, please e-mail [email or web address]

## 2020-09-22 NOTE — LETTER
September 22, 2020      Rosangela Cat MD  [address] [facility]  [place] [address]           [facility]  [place]  [address] [facility] [place] [address]  Phone: [phone]  Fax: [phone]          Patient: Leesa Le   MR Number: [id number]   YOB: 1960   Date of Visit: 9/22/2020       Dear Dr. Rosangela Cat:    Thank you for referring Leesa Le to me for evaluation. Attached you will find relevant portions of my assessment and plan of care.    If you have questions, please do not hesitate to call me. I look forward to following Leesa Le along with you.    Sincerely,    Chanel Figueroa, NP    Enclosure  CC:  No Recipients    If you would like to receive this communication electronically, please contact [email or web address] or [phone] to request more information on [email or web address] Link access.    For providers and/or their staff who would like to refer a patient to [facility], please contact us through our one-stop-shop provider referral line, [facility], at [phone].    If you feel you have received this communication in error or would no longer like to receive these types of communications, please e-mail [email or web address]

## 2020-09-25 LAB
HPV HR 12 DNA SPEC QL NAA+PROBE: NEGATIVE
HPV16 AG SPEC QL: NEGATIVE
HPV18 DNA SPEC QL NAA+PROBE: NEGATIVE

## 2020-09-30 ENCOUNTER — PROCEDURE VISIT (OUTPATIENT)
Dept: SLEEP MEDICINE | Facility: CLINIC | Age: 60
End: 2020-09-30
Payer: COMMERCIAL

## 2020-09-30 DIAGNOSIS — E11.69 HYPERLIPIDEMIA ASSOCIATED WITH TYPE 2 DIABETES MELLITUS: ICD-10-CM

## 2020-09-30 DIAGNOSIS — R53.83 FEELING TIRED: ICD-10-CM

## 2020-09-30 DIAGNOSIS — I15.2 HYPERTENSION ASSOCIATED WITH TYPE 2 DIABETES MELLITUS: ICD-10-CM

## 2020-09-30 DIAGNOSIS — E78.5 HYPERLIPIDEMIA ASSOCIATED WITH TYPE 2 DIABETES MELLITUS: ICD-10-CM

## 2020-09-30 DIAGNOSIS — E04.2 MULTIPLE THYROID NODULES: ICD-10-CM

## 2020-09-30 DIAGNOSIS — R40.0 DAYTIME SLEEPINESS: ICD-10-CM

## 2020-09-30 DIAGNOSIS — F51.04 PSYCHOPHYSIOLOGICAL INSOMNIA: ICD-10-CM

## 2020-09-30 DIAGNOSIS — E11.65 TYPE 2 DIABETES MELLITUS WITH HYPERGLYCEMIA, WITH LONG-TERM CURRENT USE OF INSULIN: ICD-10-CM

## 2020-09-30 DIAGNOSIS — F41.8 ANXIETY ASSOCIATED WITH DEPRESSION: ICD-10-CM

## 2020-09-30 DIAGNOSIS — E11.59 HYPERTENSION ASSOCIATED WITH TYPE 2 DIABETES MELLITUS: ICD-10-CM

## 2020-09-30 DIAGNOSIS — R06.83 SNORING: ICD-10-CM

## 2020-09-30 DIAGNOSIS — F33.9 MAJOR DEPRESSION, RECURRENT, CHRONIC: ICD-10-CM

## 2020-09-30 DIAGNOSIS — Z79.4 TYPE 2 DIABETES MELLITUS WITH HYPERGLYCEMIA, WITH LONG-TERM CURRENT USE OF INSULIN: ICD-10-CM

## 2020-09-30 DIAGNOSIS — F43.10 PTSD (POST-TRAUMATIC STRESS DISORDER): ICD-10-CM

## 2020-09-30 DIAGNOSIS — G47.33 OSA (OBSTRUCTIVE SLEEP APNEA): Primary | ICD-10-CM

## 2020-09-30 PROCEDURE — 95800 PR SLEEP STUDY, UNATTENDED, RECORD HEART RATE/O2 SAT/RESP ANAL/SLEEP TIME: ICD-10-PCS | Mod: 26,,, | Performed by: INTERNAL MEDICINE

## 2020-09-30 PROCEDURE — 95800 SLP STDY UNATTENDED: CPT | Mod: 26,,, | Performed by: INTERNAL MEDICINE

## 2020-09-30 PROCEDURE — 95800 SLP STDY UNATTENDED: CPT

## 2020-09-30 NOTE — Clinical Note
HISTORY OF PRESENT ILLNESS  Idalia Haley is a 77 y.o. female. HPI  Chief Complaint   Patient presents with    Dizziness     X 1 MONTH; INFREQUENT FOR OVER SIX MONTHS; [address]; TREMORS     hX OF vertigo. C/O hA  That are sharp on sides of head that are brief and new, tremors, feels like the room is spinning. Expressed concern regarding Bp medication causing dizziness. States tremors are not new but seems to be exaggerated by the dizziness. States HA also started and exacerbated by dizziness. Positive for orho static BP changes that leads to dizziness. REquest flu vaccine. Review of Systems   Constitutional: Negative for chills, fever and malaise/fatigue. HENT: Positive for congestion. Negative for ear pain, hearing loss, sinus pain and sore throat. Eyes: Negative. Respiratory: Negative. Cardiovascular: Negative. Negative for chest pain and leg swelling. Gastrointestinal: Negative. Genitourinary: Negative. Musculoskeletal: Negative. Skin: Negative. Physical Exam   Constitutional: She is oriented to person, place, and time. She appears well-developed and well-nourished. No distress. HENT:   Head: Normocephalic and atraumatic. Positive for fluid behind right TM with no Sx of infection   Eyes: Conjunctivae are normal.   Cardiovascular: Normal rate, regular rhythm, normal heart sounds and intact distal pulses. Exam reveals no gallop and no friction rub. No murmur heard. Pulmonary/Chest: Effort normal and breath sounds normal. No respiratory distress. She has no wheezes. She has no rales. Musculoskeletal: Normal range of motion. Neurological: She is alert and oriented to person, place, and time. Skin: Skin is warm and dry. She is not diaphoretic. Nursing note and vitals reviewed. Plan of care and AVS reviewed with patient who verbalized understanding.     ASSESSMENT and PLAN    ICD-10-CM ICD-9-CM    1. Dizziness R42 780.4 meclizine (ANTIVERT) 25 mg PHYSICIAN INTERPRETATION AND COMMENTS: Findings are consistent with severe, positional obstructive sleep apnea  (GENTRY), with indications of respiratory control instability. AHI was 39.0/hr with 5.8 hours sleep. Very loud snoring. SpO2 amanda  64.3%. Indications of cardiac dysrhythmia are recorded.Please consult cardiology. Based on the American academy [employer]  practice parameter of CPAP would be the guideline recommendation of choice. Other therapies may include ENT procedures were  appropriate, significant weight loss. Inspire hypoglossal nerve stimulator and nasal PROVENT or mandibular advancement device  may also be considered. Close follow up to ensure resolution of symptoms. INLAB CPAP titration  CLINICAL HISTORY: 60 year old female presented with: 13 inch neck, BMI of 24, an Jones sleepiness score of 7, history of  diabetes, depression, a previous diagnosis of GENTRY and symptoms of nocturnal snoring, waking up choking and witnessed apneas.    tablet   2. Encounter for immunization Z23 V03.89 INFLUENZA VIRUS VACCINE, HIGH DOSE SEASONAL, PRESERVATIVE FREE      ADMIN INFLUENZA VIRUS VAC      CANCELED: INFLUENZA VIRUS VAC QUAD,SPLIT,PRESV FREE SYRINGE IM   REviewed with patient use of meclizine. If does not resolve dizziness F/u with Dr. Cindy Gardiner regarding concerns about BP medications.

## 2020-10-01 ENCOUNTER — PATIENT MESSAGE (OUTPATIENT)
Dept: PULMONOLOGY | Facility: CLINIC | Age: 60
End: 2020-10-01

## 2020-10-01 DIAGNOSIS — Z79.4 TYPE 2 DIABETES MELLITUS WITH HYPERGLYCEMIA, WITH LONG-TERM CURRENT USE OF INSULIN: ICD-10-CM

## 2020-10-01 DIAGNOSIS — F33.9 MAJOR DEPRESSION, RECURRENT, CHRONIC: ICD-10-CM

## 2020-10-01 DIAGNOSIS — I15.2 HYPERTENSION ASSOCIATED WITH TYPE 2 DIABETES MELLITUS: ICD-10-CM

## 2020-10-01 DIAGNOSIS — E78.5 HYPERLIPIDEMIA ASSOCIATED WITH TYPE 2 DIABETES MELLITUS: ICD-10-CM

## 2020-10-01 DIAGNOSIS — I49.9 CARDIAC ARRHYTHMIA, UNSPECIFIED CARDIAC ARRHYTHMIA TYPE: ICD-10-CM

## 2020-10-01 DIAGNOSIS — E11.59 HYPERTENSION ASSOCIATED WITH TYPE 2 DIABETES MELLITUS: ICD-10-CM

## 2020-10-01 DIAGNOSIS — E11.65 TYPE 2 DIABETES MELLITUS WITH HYPERGLYCEMIA, WITH LONG-TERM CURRENT USE OF INSULIN: ICD-10-CM

## 2020-10-01 DIAGNOSIS — G47.33 OSA (OBSTRUCTIVE SLEEP APNEA): Primary | ICD-10-CM

## 2020-10-01 DIAGNOSIS — E11.69 HYPERLIPIDEMIA ASSOCIATED WITH TYPE 2 DIABETES MELLITUS: ICD-10-CM

## 2020-10-01 DIAGNOSIS — F43.10 PTSD (POST-TRAUMATIC STRESS DISORDER): ICD-10-CM

## 2020-10-01 DIAGNOSIS — F41.8 ANXIETY ASSOCIATED WITH DEPRESSION: ICD-10-CM

## 2020-10-01 NOTE — PROCEDURES
Home Sleep Studies    Date/Time: 9/30/2020 8:45 AM  Performed by: Landon Kong MD  Authorized by: Conchis Andre NP       PHYSICIAN INTERPRETATION AND COMMENTS: Findings are consistent with severe, positional obstructive sleep apnea  (GENTRY), with indications of respiratory control instability. AHI was 39.0/hr with 5.8 hours sleep. Very loud snoring. SpO2 amanda  64.3%. Indications of cardiac dysrhythmia are recorded.Please consult cardiology. Based on the American academy Sleep Medicine  practice parameter of CPAP would be the guideline recommendation of choice. Other therapies may include ENT procedures were  appropriate, significant weight loss. Inspire hypoglossal nerve stimulator and nasal PROVENT or mandibular advancement device  may also be considered. Close follow up to ensure resolution of symptoms. INLAB CPAP titration  CLINICAL HISTORY: 60 year old female presented with: 13 inch neck, BMI of 24, an Nunda sleepiness score of 7, history of  diabetes, depression, a previous diagnosis of GENTRY and symptoms of nocturnal snoring, waking up choking and witnessed apneas.  Based on the clinical history, the patient has a high pre-test probability of having moderate GENTRY. STOP BANG score 5

## 2020-10-01 NOTE — TELEPHONE ENCOUNTER
Orders Placed This Encounter   Procedures    CPAP Titration (Must have dx of GENTRY from previous sleep study.)     Standing Status:   Future     Standing Expiration Date:   10/1/2021     1. GENTRY (obstructive sleep apnea)  CPAP Titration (Must have dx of GENTRY from previous sleep study.)   2. PTSD (post-traumatic stress disorder)  CPAP Titration (Must have dx of GENTRY from previous sleep study.)   3. Type 2 diabetes mellitus with hyperglycemia, with long-term current use of insulin  CPAP Titration (Must have dx of GENTRY from previous sleep study.)   4. Hypertension associated with type 2 diabetes mellitus  CPAP Titration (Must have dx of GENTRY from previous sleep study.)   5. Hyperlipidemia associated with type 2 diabetes mellitus  CPAP Titration (Must have dx of GENTRY from previous sleep study.)   6. Major depression, recurrent, chronic  CPAP Titration (Must have dx of GENTRY from previous sleep study.)   7. Anxiety associated with depression  CPAP Titration (Must have dx of GENTRY from previous sleep study.)   8. Cardiac arrhythmia, unspecified cardiac arrhythmia type  CPAP Titration (Must have dx of GENTRY from previous sleep study.)
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
## 2020-10-07 ENCOUNTER — OFFICE VISIT (OUTPATIENT)
Dept: DIABETES | Facility: CLINIC | Age: 60
End: 2020-10-07
Payer: COMMERCIAL

## 2020-10-07 ENCOUNTER — PATIENT OUTREACH (OUTPATIENT)
Dept: ADMINISTRATIVE | Facility: OTHER | Age: 60
End: 2020-10-07

## 2020-10-07 VITALS — HEIGHT: 61 IN | WEIGHT: 123.88 LBS | BODY MASS INDEX: 23.39 KG/M2

## 2020-10-07 DIAGNOSIS — E78.5 HYPERLIPIDEMIA DUE TO TYPE 1 DIABETES MELLITUS: ICD-10-CM

## 2020-10-07 DIAGNOSIS — Z98.890 HISTORY OF THYROID SURGERY: ICD-10-CM

## 2020-10-07 DIAGNOSIS — E10.69 HYPERLIPIDEMIA DUE TO TYPE 1 DIABETES MELLITUS: ICD-10-CM

## 2020-10-07 DIAGNOSIS — Z91.199 NON COMPLIANCE WITH MEDICAL TREATMENT: ICD-10-CM

## 2020-10-07 DIAGNOSIS — E10.65 TYPE 1 DIABETES MELLITUS WITH HYPERGLYCEMIA, WITH LONG-TERM CURRENT USE OF INSULIN: Primary | ICD-10-CM

## 2020-10-07 DIAGNOSIS — E04.2 MULTIPLE THYROID NODULES: ICD-10-CM

## 2020-10-07 DIAGNOSIS — E10.59 HYPERTENSION ASSOCIATED WITH TYPE 1 DIABETES MELLITUS: ICD-10-CM

## 2020-10-07 DIAGNOSIS — E10.649 HYPOGLYCEMIA DUE TO TYPE 1 DIABETES MELLITUS: ICD-10-CM

## 2020-10-07 DIAGNOSIS — G47.33 OSA (OBSTRUCTIVE SLEEP APNEA): ICD-10-CM

## 2020-10-07 DIAGNOSIS — Z78.9 STATIN INTOLERANCE: ICD-10-CM

## 2020-10-07 DIAGNOSIS — I15.2 HYPERTENSION ASSOCIATED WITH TYPE 1 DIABETES MELLITUS: ICD-10-CM

## 2020-10-07 LAB
FINAL PATHOLOGIC DIAGNOSIS: NORMAL
GLUCOSE SERPL-MCNC: 230 MG/DL (ref 70–110)
Lab: NORMAL

## 2020-10-07 PROCEDURE — 99999 PR PBB SHADOW E&M-EST. PATIENT-LVL IV: ICD-10-PCS | Mod: PBBFAC,,, | Performed by: PHYSICIAN ASSISTANT

## 2020-10-07 PROCEDURE — 3046F HEMOGLOBIN A1C LEVEL >9.0%: CPT | Mod: CPTII,S$GLB,, | Performed by: PHYSICIAN ASSISTANT

## 2020-10-07 PROCEDURE — 99999 PR PBB SHADOW E&M-EST. PATIENT-LVL IV: CPT | Mod: PBBFAC,,, | Performed by: PHYSICIAN ASSISTANT

## 2020-10-07 PROCEDURE — 3046F PR MOST RECENT HEMOGLOBIN A1C LEVEL > 9.0%: ICD-10-PCS | Mod: CPTII,S$GLB,, | Performed by: PHYSICIAN ASSISTANT

## 2020-10-07 PROCEDURE — 99215 PR OFFICE/OUTPT VISIT, EST, LEVL V, 40-54 MIN: ICD-10-PCS | Mod: S$GLB,,, | Performed by: PHYSICIAN ASSISTANT

## 2020-10-07 PROCEDURE — 99215 OFFICE O/P EST HI 40 MIN: CPT | Mod: S$GLB,,, | Performed by: PHYSICIAN ASSISTANT

## 2020-10-07 PROCEDURE — 82962 GLUCOSE BLOOD TEST: CPT | Mod: S$GLB,,, | Performed by: PHYSICIAN ASSISTANT

## 2020-10-07 PROCEDURE — 3008F PR BODY MASS INDEX (BMI) DOCUMENTED: ICD-10-PCS | Mod: CPTII,S$GLB,, | Performed by: PHYSICIAN ASSISTANT

## 2020-10-07 PROCEDURE — 82962 POCT GLUCOSE, HAND-HELD DEVICE: ICD-10-PCS | Mod: S$GLB,,, | Performed by: PHYSICIAN ASSISTANT

## 2020-10-07 PROCEDURE — 3008F BODY MASS INDEX DOCD: CPT | Mod: CPTII,S$GLB,, | Performed by: PHYSICIAN ASSISTANT

## 2020-10-07 NOTE — PATIENT INSTRUCTIONS
CURRENT DM MEDICATIONS:    Basaglar 32 units at night   Novolog 8 Units with sliding scale three times daily with meals - 15 mins before meals  o BG 61 - 80: -1 units  o BG 81 - 140: +0 units  o  - 180: +1 units  o  - 220: +2 units  o  - 260: +3 units   o  - 300: +4 units  o  - 340: +5 units  o > 340: + 5 units

## 2020-10-07 NOTE — PROGRESS NOTES
PCP: Rosangela Cat MD    Subjective:     Chief Complaint: Diabetes - Established Patient    HISTORY OF PRESENT ILLNESS: 60 y.o.   female presenting for diabetes management visit.   Patient has previously been established with the Diabetes Management Department and was last seen by Dhara Gilmore NP on 04/30/14.   Patient is new to me and is here for establishment of future care .   Patient has had Type I diabetes due to low C peptide since 10 years ago.  Pertinent to decision making is the following comorbidities: HTN, HLD and Multinodular goiter s/p thyroid surgery, GENTRY on CPAP, Statin Intolerance, and Medical Noncompliance  Patient has the following Diabetes complications: without complications  She  has attended diabetes education in the past. Patient admits she has not seen a Diabetic Educator in several years.     Patient's most recent A1c of 13.9% was completed 3 weeks ago.   Patient states since Her last A1c Her blood glucose levels have been low overnight.   Patient monitors blood glucose 4 times per day with meter : Fasting, Before Lunch, Before Dinner and Before Bed.   Patient blood glucose monitoring device will not be uploaded into [employer] today secondary to patient forgetting device.   Per patient, blood sugars ranging from 200 - 500 throughout the day with lows to 50s overnight. Per patient, sometimes medication adherence is an issue.   Patient endorses the following diabetes related symptoms: Polydipsia and Polyuria.   Patient is due today for the following diabetes-related health maintenance standards: Foot Exam  and Eye Exam.   She denies recent hospital admissions or emergency room visits. Patient denies known DKA admissions.  She voices having hypoglycemia overnight down to 50s.   Patient's concerns today include glycemic control.   Patient medication regimen is as below.     CURRENT DM MEDICATIONS:    Basaglar 34 units qhs    Novolog 8 Units with sliding scale TID wm - as  "below  o BG 61 - 80: -1 units  o BG 81 - 140: +0 units  o  - 180: +1 units  o  - 220: +2 units  o  - 260: +3 units   o  - 300: +4 units  o  - 340: +5 units  o > 340: + 5 units        Patient has failed the following Diabetes medications:    Glucovance       Labs Reviewed.       Lab Results   Component Value Date    CPEPTIDE 0.76 (L) 10/08/2020     No results found for: GLUTAMICACID    Height: 5' 1" (154.9 cm)  //  Weight: 56.2 kg (123 lb 14.4 oz), Body mass index is 23.41 kg/m².  Her blood sugar in clinic today is:    Lab Results   Component Value Date    POCGLU 230 (A) 10/07/2020       Review of Systems   Constitutional: Negative for activity change, appetite change, chills and fever.   HENT: Negative for dental problem, mouth sores, nosebleeds, sore throat and trouble swallowing.    Eyes: Negative for pain and discharge.   Respiratory: Negative for shortness of breath, wheezing and stridor.    Cardiovascular: Negative for chest pain, palpitations and leg swelling.   Gastrointestinal: Negative for abdominal pain, diarrhea, nausea and vomiting.   Endocrine: Negative for polydipsia, polyphagia and polyuria.   Genitourinary: Negative for dysuria, frequency and urgency.   Musculoskeletal: Negative for joint swelling and myalgias.   Skin: Negative for rash and wound.   Neurological: Negative for dizziness, syncope, weakness and headaches.   Psychiatric/Behavioral: Negative for behavioral problems and dysphoric mood.         Diabetes Management Status  Statin: Not taking  ACE/ARB: Taking    Screening or Prevention Patient's value Goal Complete/Controlled?   HgA1C Testing and Control   Lab Results   Component Value Date    HGBA1C 13.9 (H) 09/21/2020      Annually/Less than 8% No   Lipid profile : 09/21/2020 Annually Yes   LDL control Lab Results   Component Value Date    LDLCALC 184.2 (H) 09/21/2020    Annually/Less than 100 mg/dl  No   Nephropathy screening Lab Results   Component Value " Date    MICALBCREAT 12.3 2020     Lab Results   Component Value Date    PROTEINUA Negative 2020    Annually Yes   Blood pressure BP Readings from Last 1 Encounters:   20 128/84    Less than 140/90 Yes   Dilated retinal exam : 2017 Annually No    Foot exam   : 2019 Annually No     Social History     Socioeconomic History    Marital status:      Spouse name: Not on file    Number of children: 3    Years of education: Not on file    Highest education level: Not on file   Occupational History     Employer: [employer]   Social Needs    Financial resource strain: Not on file    Food insecurity     Worry: Not on file     Inability: Not on file    Transportation needs     Medical: Not on file     Non-medical: Not on file   Tobacco Use    Smoking status: Former Smoker     Packs/day: 1.00     Years: 15.00     Pack years: 15.00     Types: Cigarettes     Start date:      Quit date: 2007     Years since quittin.7    Smokeless tobacco: Never Used    Tobacco comment: smoked off and on from [id number] about 15 years total years smoking.   Substance and Sexual Activity    Alcohol use: Yes     Alcohol/week: 21.0 standard drinks     Types: 21 Glasses of wine per week    Drug use: No    Sexual activity: Yes     Partners: Male   Lifestyle    Physical activity     Days per week: Not on file     Minutes per session: Not on file    Stress: Not on file   Relationships    Social connections     Talks on phone: Not on file     Gets together: Not on file     Attends Baptism service: Not on file     Active member of club or organization: Not on file     Attends meetings of clubs or organizations: Not on file     Relationship status: Not on file   Other Topics Concern    Not on file   Social History Narrative    , 3 kids, [employer].     Past Medical History:   Diagnosis Date    Abnormal Pap smear of vagina     Arthritis     Left shoulder    Diabetes mellitus  type II     Dr Ayala manages DM     Hyperlipidemia     Hypertension     Thyroid disease     Dr Torres marrioneaux- goitre+       Objective:        Physical Exam  Constitutional:       General: She is not in acute distress.     Appearance: She is well-developed. She is not diaphoretic.   HENT:      Head: Normocephalic and atraumatic.      Right Ear: External ear normal.      Left Ear: External ear normal.      Nose: Nose normal.   Eyes:      General:         Right eye: No discharge.         Left eye: No discharge.      Pupils: Pupils are equal, round, and reactive to light.   Neck:      Musculoskeletal: Normal range of motion and neck supple.   Cardiovascular:      Rate and Rhythm: Normal rate and regular rhythm.      Pulses:           Dorsalis pedis pulses are 2+ on the right side and 2+ on the left side.        Posterior tibial pulses are 2+ on the right side and 2+ on the left side.      Heart sounds: Normal heart sounds.   Pulmonary:      Effort: Pulmonary effort is normal.      Breath sounds: Normal breath sounds.   Abdominal:      Palpations: Abdomen is soft.   Musculoskeletal: Normal range of motion.      Right foot: Normal range of motion. No deformity.      Left foot: Normal range of motion. No deformity.   Feet:      Right foot:      Protective Sensation: 6 sites tested. 6 sites sensed.      Skin integrity: Dry skin present. No ulcer, blister, skin breakdown, erythema, warmth or callus.      Left foot:      Protective Sensation: 6 sites tested. 6 sites sensed.      Skin integrity: Dry skin present. No ulcer, blister, skin breakdown, erythema, warmth or callus.   Skin:     General: Skin is warm and dry.      Capillary Refill: Capillary refill takes less than 2 seconds.   Neurological:      Mental Status: She is oriented to person, place, and time.   Psychiatric:         Behavior: Behavior normal.         Thought Content: Thought content normal.         Judgment: Judgment normal.           Assessment / Plan:      Type 2 diabetes mellitus with hyperglycemia, with long-term current use of insulin  -     Ambulatory referral/consult to Diabetic Advanced Practice Providers (Medical Management)  -     POCT Glucose, Hand-Held Device  -     Glutamic Acid Decarboxylase; Future; Expected date: 10/07/2020  -     C-Peptide; Future; Expected date: 10/07/2020  -     Glucose, Fasting; Future; Expected date: 10/07/2020  -     Ambulatory referral/consult to Diabetes Education; Future; Expected date: 10/14/2020  -     GLUCOSE MONITORING CONTINUOUS MIN 72 HOURS; Future    Hypoglycemia associated with type 2 diabetes mellitus  -     GLUCOSE MONITORING CONTINUOUS MIN 72 HOURS; Future    Non compliance with medical treatment  -     Ambulatory referral/consult to Diabetic Advanced Practice Providers (Medical Management)    Hypertension associated with type 2 diabetes mellitus    Hyperlipidemia associated with type 2 diabetes mellitus    Multiple thyroid nodules    History of thyroid surgery    Statin intolerance    GENTRY (obstructive sleep apnea)      Additional Plan Details:    - POCT Glucose  - Encouraged continuation of lifestyle changes including regular exercise and limiting carbohydrates to 30-45 grams per meal threes times daily and 15 grams per snack with a limit of two daily.   - Encouraged continued monitoring of blood glucose with maintenance of 4 times daily at Fasting, Before Lunch, Before Dinner and Before Bed. Professional CGM to be placed today to evaluate overnight hypoglycemia. Dexcom paperwork today.   - Current DM Medication Regimen: Change Basaglar to 32 units. Continue Novolog 8 units with ss TID wm - per below. FOCUS ON ADHERENCE. Will update recommendations following Professional CGM review.   - Health Maintenance standards addressed today: Foot Exam - completed today and documented in physical exam with feedback to patient about proper diabetic foot care and findings and Eye Exam - will be completed outside of [facility]  and patient will schedule  - Referral to CDE for establishment of care for comprehensive Diabetes Education  - Fasting labs scheduled  - Nursing Visit: Patient is age 79 or younger with an A1c of 7.5 or greater and will need Professional CGM off in 2 weeks.   - Follow up in 6 weeks with A1c prior. Follow up CDE in 2 weeks - same day as CGM download.     Novolog 8 Units with sliding scale three times daily with meals - 15 mins before meals  o BG 61 - 80: -1 units  o BG 81 - 140: +0 units  o  - 180: +1 units  o  - 220: +2 units  o  - 260: +3 units   o  - 300: +4 units  o  - 340: +5 units  o > 340: + 5 units     Blakeney McKnight, PA-C [facility]      A total of 60 minutes was spent in face to face time, of which over 50 % was spent in counseling patient on disease process, complications, treatment, and side effects of medications.    ADDENDUM:   C peptide level was resulted following visit which showed patient now Type I diabetes due to low C peptide. Will addend chart and diagnoses to reflect.

## 2020-10-07 NOTE — LETTER
October 15, 2020      Rosangela Cat MD  [address] [facility]  [place] [address]           [facility]  [address] [facility]  [place] [address]  Phone: [phone]  Fax: [phone]          Patient: Leesa Le   MR Number: [id number]   YOB: 1960   Date of Visit: 10/7/2020       Dear Dr. Rosangela Cat:    Thank you for referring Leesa Le to me for evaluation. Attached you will find relevant portions of my assessment and plan of care.    If you have questions, please do not hesitate to call me. I look forward to following Leesa Le along with you.    Sincerely,    Bulmaro Aguilera PA-C    Enclosure  CC:  No Recipients    If you would like to receive this communication electronically, please contact [email or web address] or [phone] to request more information on "[employer] Link access.    For providers and/or their staff who would like to refer a patient to [facility], please contact us through our one-stop-shop provider referral line, [facility] [place], at [phone].    If you feel you have received this communication in error or would no longer like to receive these types of communications, please e-mail [email or web address]

## 2020-10-08 ENCOUNTER — PATIENT MESSAGE (OUTPATIENT)
Dept: OBSTETRICS AND GYNECOLOGY | Facility: CLINIC | Age: 60
End: 2020-10-08

## 2020-10-08 ENCOUNTER — LAB VISIT (OUTPATIENT)
Dept: LAB | Facility: HOSPITAL | Age: 60
End: 2020-10-08
Attending: PHYSICIAN ASSISTANT
Payer: COMMERCIAL

## 2020-10-08 DIAGNOSIS — Z79.4 TYPE 2 DIABETES MELLITUS WITH HYPERGLYCEMIA, WITH LONG-TERM CURRENT USE OF INSULIN: ICD-10-CM

## 2020-10-08 DIAGNOSIS — Z03.818 ENCOUNTER FOR OBSERVATION FOR SUSPECTED EXPOSURE TO OTHER BIOLOGICAL AGENTS RULED OUT: ICD-10-CM

## 2020-10-08 DIAGNOSIS — E11.65 TYPE 2 DIABETES MELLITUS WITH HYPERGLYCEMIA, WITH LONG-TERM CURRENT USE OF INSULIN: ICD-10-CM

## 2020-10-08 LAB
C PEPTIDE SERPL-MCNC: 0.76 NG/ML (ref 0.78–5.19)
GLUCOSE SERPL-MCNC: 160 MG/DL (ref 70–110)

## 2020-10-08 PROCEDURE — 84681 ASSAY OF C-PEPTIDE: CPT

## 2020-10-08 PROCEDURE — 86341 ISLET CELL ANTIBODY: CPT

## 2020-10-08 PROCEDURE — 36415 COLL VENOUS BLD VENIPUNCTURE: CPT

## 2020-10-08 PROCEDURE — 82947 ASSAY GLUCOSE BLOOD QUANT: CPT

## 2020-10-15 LAB — GAD65 AB SER-SCNC: 0 NMOL/L

## 2020-10-18 ENCOUNTER — LAB VISIT (OUTPATIENT)
Dept: URGENT CARE | Facility: CLINIC | Age: 60
End: 2020-10-18
Payer: COMMERCIAL

## 2020-10-18 DIAGNOSIS — Z03.818 ENCOUNTER FOR OBSERVATION FOR SUSPECTED EXPOSURE TO OTHER BIOLOGICAL AGENTS RULED OUT: ICD-10-CM

## 2020-10-18 PROCEDURE — U0003 INFECTIOUS AGENT DETECTION BY NUCLEIC ACID (DNA OR RNA); SEVERE ACUTE RESPIRATORY SYNDROME CORONAVIRUS 2 (SARS-COV-2) (CORONAVIRUS DISEASE [COVID-19]), AMPLIFIED PROBE TECHNIQUE, MAKING USE OF HIGH THROUGHPUT TECHNOLOGIES AS DESCRIBED BY CMS-2020-01-R: HCPCS

## 2020-10-19 LAB — SARS-COV-2 RNA RESP QL NAA+PROBE: NOT DETECTED

## 2020-10-20 ENCOUNTER — OFFICE VISIT (OUTPATIENT)
Dept: PSYCHIATRY | Facility: CLINIC | Age: 60
End: 2020-10-20
Payer: COMMERCIAL

## 2020-10-20 DIAGNOSIS — F33.2 SEVERE RECURRENT MAJOR DEPRESSION WITHOUT PSYCHOTIC FEATURES: ICD-10-CM

## 2020-10-20 PROCEDURE — 99999 PR PBB SHADOW E&M-EST. PATIENT-LVL I: ICD-10-PCS | Mod: PBBFAC,,, | Performed by: SOCIAL WORKER

## 2020-10-20 PROCEDURE — 90791 PSYCH DIAGNOSTIC EVALUATION: CPT | Mod: S$GLB,,, | Performed by: SOCIAL WORKER

## 2020-10-20 PROCEDURE — 99999 PR PBB SHADOW E&M-EST. PATIENT-LVL I: CPT | Mod: PBBFAC,,, | Performed by: SOCIAL WORKER

## 2020-10-20 PROCEDURE — 90791 PR PSYCHIATRIC DIAGNOSTIC EVALUATION: ICD-10-PCS | Mod: S$GLB,,, | Performed by: SOCIAL WORKER
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
## 2020-10-20 NOTE — LETTER
October 21, 2020        Rosangela Cat MD  [address] [place] [address]             [facility]  [address] [place] [address]  Phone: [phone]  Fax: [phone]   Patient: Leesa Le   MR Number: [id number]   YOB: 1960   Date of Visit: 10/20/2020       Dear Dr. Cat:    Thank you for referring Leesa Le to me for evaluation. Below are the relevant portions of my assessment and plan of care.    If you have questions, please do not hesitate to call me. I look forward to following Leesa along with you.    Sincerely,      Rickey Winkler, PRABHAKAR           CC  No Recipients

## 2020-10-21 ENCOUNTER — CLINICAL SUPPORT (OUTPATIENT)
Dept: DIABETES | Facility: CLINIC | Age: 60
End: 2020-10-21
Payer: COMMERCIAL

## 2020-10-21 ENCOUNTER — HOSPITAL ENCOUNTER (OUTPATIENT)
Dept: SLEEP MEDICINE | Facility: HOSPITAL | Age: 60
Discharge: HOME | End: 2020-10-21
Attending: NURSE PRACTITIONER
Payer: COMMERCIAL

## 2020-10-21 VITALS — HEIGHT: 61 IN | WEIGHT: 121.5 LBS | BODY MASS INDEX: 22.94 KG/M2

## 2020-10-21 DIAGNOSIS — E10.649 HYPOGLYCEMIA DUE TO TYPE 1 DIABETES MELLITUS: ICD-10-CM

## 2020-10-21 DIAGNOSIS — E11.59 HYPERTENSION ASSOCIATED WITH TYPE 2 DIABETES MELLITUS: ICD-10-CM

## 2020-10-21 DIAGNOSIS — F33.9 MAJOR DEPRESSION, RECURRENT, CHRONIC: ICD-10-CM

## 2020-10-21 DIAGNOSIS — E11.65 TYPE 2 DIABETES MELLITUS WITH HYPERGLYCEMIA, WITH LONG-TERM CURRENT USE OF INSULIN: ICD-10-CM

## 2020-10-21 DIAGNOSIS — I15.2 HYPERTENSION ASSOCIATED WITH TYPE 2 DIABETES MELLITUS: ICD-10-CM

## 2020-10-21 DIAGNOSIS — Z79.4 TYPE 2 DIABETES MELLITUS WITH HYPERGLYCEMIA, WITH LONG-TERM CURRENT USE OF INSULIN: ICD-10-CM

## 2020-10-21 DIAGNOSIS — F41.8 ANXIETY ASSOCIATED WITH DEPRESSION: ICD-10-CM

## 2020-10-21 DIAGNOSIS — G47.33 OSA (OBSTRUCTIVE SLEEP APNEA): ICD-10-CM

## 2020-10-21 DIAGNOSIS — F43.10 PTSD (POST-TRAUMATIC STRESS DISORDER): ICD-10-CM

## 2020-10-21 DIAGNOSIS — E10.65 TYPE 1 DIABETES MELLITUS WITH HYPERGLYCEMIA, WITH LONG-TERM CURRENT USE OF INSULIN: Primary | ICD-10-CM

## 2020-10-21 DIAGNOSIS — E78.5 HYPERLIPIDEMIA ASSOCIATED WITH TYPE 2 DIABETES MELLITUS: ICD-10-CM

## 2020-10-21 DIAGNOSIS — I49.9 CARDIAC ARRHYTHMIA, UNSPECIFIED CARDIAC ARRHYTHMIA TYPE: ICD-10-CM

## 2020-10-21 DIAGNOSIS — E10.65 TYPE 1 DIABETES MELLITUS WITH HYPERGLYCEMIA, WITH LONG-TERM CURRENT USE OF INSULIN: ICD-10-CM

## 2020-10-21 DIAGNOSIS — E11.69 HYPERLIPIDEMIA ASSOCIATED WITH TYPE 2 DIABETES MELLITUS: ICD-10-CM

## 2020-10-21 PROCEDURE — 99999 PR PBB SHADOW E&M-EST. PATIENT-LVL I: CPT | Mod: PBBFAC,,,

## 2020-10-21 PROCEDURE — 99999 PR PBB SHADOW E&M-EST. PATIENT-LVL III: ICD-10-PCS | Mod: PBBFAC,,, | Performed by: DIETITIAN, REGISTERED

## 2020-10-21 PROCEDURE — 95811 PR POLYSOMNOGRAPHY W/CPAP: ICD-10-PCS | Mod: 26,,, | Performed by: INTERNAL MEDICINE

## 2020-10-21 PROCEDURE — 99499 NO LOS: ICD-10-PCS | Mod: S$GLB,,, | Performed by: PEDIATRICS

## 2020-10-21 PROCEDURE — G0108 PR DIAB MANAGE TRN  PER INDIV: ICD-10-PCS | Mod: S$GLB,,, | Performed by: DIETITIAN, REGISTERED

## 2020-10-21 PROCEDURE — 99999 PR PBB SHADOW E&M-EST. PATIENT-LVL I: ICD-10-PCS | Mod: PBBFAC,,,

## 2020-10-21 PROCEDURE — 95251 CONT GLUC MNTR ANALYSIS I&R: CPT | Mod: S$GLB,,, | Performed by: PHYSICIAN ASSISTANT

## 2020-10-21 PROCEDURE — 95811 POLYSOM 6/>YRS CPAP 4/> PARM: CPT | Mod: 26,,, | Performed by: INTERNAL MEDICINE

## 2020-10-21 PROCEDURE — G0108 DIAB MANAGE TRN  PER INDIV: HCPCS | Mod: S$GLB,,, | Performed by: DIETITIAN, REGISTERED

## 2020-10-21 PROCEDURE — 95251 PR GLUCOSE MONITOR, 72 HOUR, PHYS INTERP: ICD-10-PCS | Mod: S$GLB,,, | Performed by: PHYSICIAN ASSISTANT

## 2020-10-21 PROCEDURE — 95811 POLYSOM 6/>YRS CPAP 4/> PARM: CPT

## 2020-10-21 PROCEDURE — 99999 PR PBB SHADOW E&M-EST. PATIENT-LVL III: CPT | Mod: PBBFAC,,, | Performed by: DIETITIAN, REGISTERED

## 2020-10-21 PROCEDURE — 99499 UNLISTED E&M SERVICE: CPT | Mod: S$GLB,,, | Performed by: PEDIATRICS

## 2020-10-21 NOTE — Clinical Note
No Significant Obstructive Sleep apnea(GENTRY) Overall AHI 3.1/hr.  Optimal pressure attained. BIPAP 14/10 was optimal.  EKG showed no cardiac abnormalities.  No snoring was audible during this study.  No Significant Central Sleep Apnea (CSA)  No significant periodic leg movements(PLMs) during sleep.  Adam reduced sleep efficiency, long primary sleep latency, normal REM sleep latency and normal slow wave  latency.  Supplemental oxygen was not administered during the study.  Leesa Le - 1960  Page 2 of 3  Electronically Authenticated By Landon Kong MD on 10/23/2020 08:19 PM, from [phone]  Landon Kong MD  NPI: [id number]  RECOMMENDATIONS  MISCELLANEOUS  Obstructive Sleep Apnea (G47.33)  Trial of BiPAP therapy on 14/10 cm H2O with a Small size Resmed Nasal Mask Airfit N20 mask and heated  humidification.  Avoid alcohol, sedatives and other CNS depressants that may worsen sleep apnea and disrupt normal sleep  architecture.

## 2020-10-21 NOTE — LETTER
October 22, 2020        Bulmaro Aguilera PA-C  [address] [facility] [address]             [facility]  [address] [facility] [address]  Phone: [phone]  Fax: [phone]   Patient: Leesa Le   MR Number: [id number]   YOB: 1960   Date of Visit: 10/21/2020       Dear Dr. Aguilera:    Thank you for referring Leesa Le to me for evaluation. Below are the relevant portions of my assessment and plan of care.            If you have questions, please do not hesitate to call me. I look forward to following Leesa along with you.    Sincerely,      Jacob Sauer RD           CC  No Recipients
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
## 2020-10-21 NOTE — PROGRESS NOTES
Nursing Visit for Provider Bulmaro Aguilera PA-C in Diabetes - 10/21/2020    Date of Last Visit: 10/07/2020    Medication Regimen (Listed in HPI):   · Basaglar 32 units at night  · Novolog 8 Units with sliding scale three times daily with meals or ISF 40   ? BG 61 - 80: -1 units  ? BG 81 - 140: +0 units  ?  - 180: +1 units  ?  - 220: +2 units  ?  - 260: +3 units   ?  - 300: +4 units  ?  - 340: +5 units  ? > 340: + 5 units      Medications reviewed with Patient who states taking as advised in Regimen Change:   yes    Nursing Visit Reason:   Download of Blood Glucose Meter/CGM/Pump     The following was uploaded to Media Section:   CGM - Professional     Interpretation of CGM as follows:  Dates Reviewed: 10/07/20 - 10/21/20  CGM Time in Use: 100%  Average Blood Glucose: 151 mg/dL  Variability Below Median:  41.1%  Estimated A1C: 6.9%  Time in Glucose Target Range: 62%  Time Above Glucose Target Range: 30% (8% -  Very High)  Time Below Glucose Target Range: 8% (3% - Very Low)     Trends:   - Postprandial hyperglycemia followed by hypoglycemia  - Some episodes of postprandial hypoglycemia  - Basal coverage does not appear to be contributing to hypoglycemia    Patient Comments/Discussion:   - Patient had CGM off visit and initial CDE consultation with Jacob Sauer on same day. Collaborative review between OJ and CDE and medical decision   making was made.   - During meeting with Jacob Sauer CDE patient admits that at times she takes 8 units with sliding scale following meal and uses correction scale with   postprandial hyperglycemia. Counseled patient on why dosing needed to be given prior to eating and post meal using just correction dosing.     Changes to Regimen:   - Continue Basaglar 32 units qhs. May need to decrease basal coverage in the future, but will defer for now since not contributing to hypoglycemia.   - Change Novolog 6 Units with sliding scale three times daily with  meals or ISF 40   ?  - 180: +1 units  ?  - 220: +2 units  ?  - 260: +3 units   ?  - 300: +4 units  ?  - 340: +5 units  ? > 340: + 5 units   - Encouraged patient to take Novolog dosing to before meal. If patient forgets, advised to take post meal per correction dosing only.   - Dexcom CGM pending for patient. Will place sample today.     Interpretation included trends and recommended changes to regimen reviewed with patient.     Bulmaro Aguilera PA-C  Diabetes Management

## 2020-10-21 NOTE — PATIENT INSTRUCTIONS
Continue 32 units of Basaglar     Take Novolog before meals  Base dose of 6 units of Novolog for food  For correction of BG add the following to the base dose of 6 units:   ? BG 61 - 80: -1 units  ? BG 81 - 140: +0 units  ?  - 180: +1 units  ?  - 220: +2 units  ?  - 260: +3 units   ?  - 300: +4 units  ?  - 340: +5 units  ? > 340: + 5 units     If you forget to take Novolog before eating and you check BG 2 hours later, use the correction portion of the sliding scale for insulin dose.

## 2020-10-24 NOTE — PROCEDURES
"No Significant Obstructive Sleep apnea(GENTRY) Overall AHI 3.1/hr.  Optimal pressure attained. BIPAP 14/10 was optimal.  EKG showed no cardiac abnormalities.  No snoring was audible during this study.  No Significant Central Sleep Apnea (CSA)  No significant periodic leg movements(PLMs) during sleep.  Adam reduced sleep efficiency, long primary sleep latency, normal REM sleep latency and normal slow wave  latency.  Supplemental oxygen was not administered during the study.  Leesa Le - 1960  Page 2 of 3  Electronically Authenticated By Landon Kong MD on 10/23/2020 08:19 PM, from [phone]  Landon Kong MD  NPI: [id number]  RECOMMENDATIONS  MISCELLANEOUS  Obstructive Sleep Apnea (G47.33)  Trial of BiPAP therapy on 14/10 cm H2O with a Small size Resmed Nasal Mask Airfit N20 mask and heated  humidification.  Avoid alcohol, sedatives and other CNS depressants that may worsen sleep apnea and disrupt normal sleep  architecture.  Sleep hygiene should be reviewed to assess factors that may improve sleep quality.  Weight management and regular exercise should be initiated or continued.    See imported Sleep Study result in "Chart Review" under the   "Media tab".      (This Sleep Study was interpreted by a Board Certified Sleep   Specialist who conducted an epoch-by-epoch review of the entire   raw data recording.)     (The indication for this sleep study was reviewed and deemed   appropriate by [facility] Practice Parameters or other reasons by a   Board Certified Sleep Specialist.)    Landon Kong MD     "

## 2020-10-26 ENCOUNTER — PATIENT MESSAGE (OUTPATIENT)
Dept: PULMONOLOGY | Facility: CLINIC | Age: 60
End: 2020-10-26

## 2020-10-26 DIAGNOSIS — G47.33 OSA (OBSTRUCTIVE SLEEP APNEA): Primary | ICD-10-CM

## 2020-10-26 NOTE — TELEPHONE ENCOUNTER
Orders Placed This Encounter   Procedures    BIPAP FOR HOME USE     Home Sleep Study 2 nights 9/27/2020, 9/28/2020 Findings are consistent with severe, positional obstructive sleep apnea  (GENTRY), with indications of respiratory control instability. AHI was 39.0/hr with 5.8 hours sleep.  PAP titration 10/21/2020 CPAP not tolerate. BIPAP 14/10 optimal. Trial of BiPAP therapy on 14/10 cm H2O with a Small size Resmed Nasal Mask Airfit N20 mask and heated humidification.     Order Specific Question:   Type:     Answer:   BiPap     Order Specific Question:   BiPap setting (cmH20) Inspiratory:     Answer:   14     Order Specific Question:   BiPap setting (cmH20) Expiratory:     Answer:   10     Order Specific Question:   Length of need (1-99 months):     Answer:   99     Order Specific Question:   Humidification:     Answer:   Heated     Order Specific Question:   Type of mask:     Answer:   Nasal     Order Specific Question:   Accessories needed:     Answer:   Cushions     Order Specific Question:   Accessories needed:     Answer:   Chin strap     Order Specific Question:   Accessories needed:     Answer:   Filters     Order Specific Question:   Accessories needed:     Answer:   Headgear     Order Specific Question:   Accessories needed:     Answer:   Humidifier chamber     Order Specific Question:   Accessories needed:     Answer:   Tubing     Order Specific Question:   BiPap supply refills:     Answer:   12     1. GENTRY (obstructive sleep apnea)  BIPAP FOR HOME USE

## 2020-11-04 ENCOUNTER — CLINICAL SUPPORT (OUTPATIENT)
Dept: DIABETES | Facility: CLINIC | Age: 60
End: 2020-11-04
Payer: COMMERCIAL

## 2020-11-04 VITALS — HEIGHT: 61 IN | WEIGHT: 124.13 LBS | BODY MASS INDEX: 23.43 KG/M2

## 2020-11-04 DIAGNOSIS — E10.65 TYPE 1 DIABETES MELLITUS WITH HYPERGLYCEMIA, WITH LONG-TERM CURRENT USE OF INSULIN: Primary | ICD-10-CM

## 2020-11-04 PROCEDURE — G0108 PR DIAB MANAGE TRN  PER INDIV: ICD-10-PCS | Mod: S$GLB,,, | Performed by: DIETITIAN, REGISTERED

## 2020-11-04 PROCEDURE — 99999 PR PBB SHADOW E&M-EST. PATIENT-LVL II: CPT | Mod: PBBFAC,,, | Performed by: DIETITIAN, REGISTERED

## 2020-11-04 PROCEDURE — 99999 PR PBB SHADOW E&M-EST. PATIENT-LVL II: ICD-10-PCS | Mod: PBBFAC,,, | Performed by: DIETITIAN, REGISTERED

## 2020-11-04 PROCEDURE — G0108 DIAB MANAGE TRN  PER INDIV: HCPCS | Mod: S$GLB,,, | Performed by: DIETITIAN, REGISTERED
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
## 2020-11-05 ENCOUNTER — PATIENT MESSAGE (OUTPATIENT)
Dept: PULMONOLOGY | Facility: CLINIC | Age: 60
End: 2020-11-05

## 2020-11-10 ENCOUNTER — TELEPHONE (OUTPATIENT)
Dept: PULMONOLOGY | Facility: CLINIC | Age: 60
End: 2020-11-10

## 2020-11-10 DIAGNOSIS — G47.33 OSA (OBSTRUCTIVE SLEEP APNEA): Primary | ICD-10-CM

## 2020-11-10 NOTE — TELEPHONE ENCOUNTER
----- Message from Jessica Courtney sent at 11/7/2020 11:56 AM CST -----  Regarding: Sleep Study.  The patient needs to prove CPAP was tried and failed before getting a BIPAP unit. The titration study says cpap was initiated but the graph doesn't show it was tried. I am unable to process the patients order for BIPAP with this sleep study.

## 2020-11-10 NOTE — TELEPHONE ENCOUNTER
[facility] from [facility], TAWNYA             The patient needs to prove CPAP was tried and failed before getting a BIPAP unit. The titration study says cpap was initiated but the graph doesn't show it was tried. I am unable to process the patients order for BIPAP with this sleep study.

## 2020-11-10 NOTE — TELEPHONE ENCOUNTER
After review of sleep study insurance will not provide covered with BiPAP approved optimal on PAP titration.  Related to CPAP titration levels were not documented in the graft.  Patient confirms that she did not sleep well that night when PAP testing was done in the sleep lab.    She is not agreeable to return to the sleep lab for repeat titration.  She is agreeable to begin auto CPAP.  Plan re-evaluation between 30 in 90 days determine if CPAP is effective.  If not effective then will plan transition to BiPAP.    Orders Placed This Encounter   Procedures    CPAP FOR HOME USE     Home Sleep Study 9/27/2020, 9/28/2020 severe obstructive sleep apnea AHI 39.0.     Order Specific Question:   Length of need (1-99 months):     Answer:   99     Order Specific Question:   Type ():     Answer:   Auto CPAP     Order Specific Question:   Auto CPAP pressure setting range (cmH20):     Answer:   4-20     Order Specific Question:   Humidification (/):     Answer:   Heated     Order Specific Question:   Choose ONE mask type and its corresponding cushions and/or pillows:     Answer:    Full Face Mask, 1 per 90 days:  Full Face Cushion, (3 per 90 days)     Order Specific Question:   Choose EITHER Heated or Non-Heated Tubjing     Answer:    Non-Heated Tubing, 1 per 90 days     Order Specific Question:   Number of Days Needed:     Answer:   99     Order Specific Question:   All other supplies as needed as listed below:     Answer:    Headgear, 1 per 180 days     Order Specific Question:   All other supplies as needed as listed below:     Answer:    Chin Strap, 1 per 180 days     Order Specific Question:   All other supplies as needed as listed below:     Answer:    Disposable Filter, 6 per 90 days     Order Specific Question:   All other supplies as needed as listed below:     Answer:    Non-Disposable Filter, 1 per 180 days     Order Specific Question:   All other supplies as needed  as listed below:     Answer:    Humidifier Chamber, 1 per 180 days     1. GENTRY (obstructive sleep apnea)  CPAP FOR HOME USE     '

## 2020-12-13 ENCOUNTER — PATIENT OUTREACH (OUTPATIENT)
Dept: ADMINISTRATIVE | Facility: OTHER | Age: 60
End: 2020-12-13

## 2020-12-14 ENCOUNTER — CLINICAL SUPPORT (OUTPATIENT)
Dept: DIABETES | Facility: CLINIC | Age: 60
End: 2020-12-14
Payer: COMMERCIAL

## 2020-12-14 ENCOUNTER — OFFICE VISIT (OUTPATIENT)
Dept: DIABETES | Facility: CLINIC | Age: 60
End: 2020-12-14
Payer: COMMERCIAL

## 2020-12-14 ENCOUNTER — LAB VISIT (OUTPATIENT)
Dept: LAB | Facility: HOSPITAL | Age: 60
End: 2020-12-14
Attending: FAMILY MEDICINE
Payer: COMMERCIAL

## 2020-12-14 VITALS
HEIGHT: 61 IN | SYSTOLIC BLOOD PRESSURE: 146 MMHG | DIASTOLIC BLOOD PRESSURE: 84 MMHG | BODY MASS INDEX: 22.81 KG/M2 | HEART RATE: 90 BPM | WEIGHT: 120.81 LBS | TEMPERATURE: 97 F

## 2020-12-14 DIAGNOSIS — Z98.890 HISTORY OF THYROID SURGERY: ICD-10-CM

## 2020-12-14 DIAGNOSIS — E11.9 TYPE 2 DIABETES MELLITUS WITHOUT COMPLICATION: ICD-10-CM

## 2020-12-14 DIAGNOSIS — Z78.9 STATIN INTOLERANCE: ICD-10-CM

## 2020-12-14 DIAGNOSIS — E10.59 HYPERTENSION ASSOCIATED WITH TYPE 1 DIABETES MELLITUS: ICD-10-CM

## 2020-12-14 DIAGNOSIS — E10.65 TYPE 1 DIABETES MELLITUS WITH HYPERGLYCEMIA, WITH LONG-TERM CURRENT USE OF INSULIN: Primary | ICD-10-CM

## 2020-12-14 DIAGNOSIS — G47.33 OSA (OBSTRUCTIVE SLEEP APNEA): ICD-10-CM

## 2020-12-14 DIAGNOSIS — I15.2 HYPERTENSION ASSOCIATED WITH TYPE 1 DIABETES MELLITUS: ICD-10-CM

## 2020-12-14 DIAGNOSIS — E04.2 MULTIPLE THYROID NODULES: ICD-10-CM

## 2020-12-14 DIAGNOSIS — E10.69 HYPERLIPIDEMIA DUE TO TYPE 1 DIABETES MELLITUS: ICD-10-CM

## 2020-12-14 DIAGNOSIS — E10.649 HYPOGLYCEMIA DUE TO TYPE 1 DIABETES MELLITUS: ICD-10-CM

## 2020-12-14 DIAGNOSIS — Z91.199 NON COMPLIANCE WITH MEDICAL TREATMENT: ICD-10-CM

## 2020-12-14 DIAGNOSIS — E78.5 HYPERLIPIDEMIA DUE TO TYPE 1 DIABETES MELLITUS: ICD-10-CM

## 2020-12-14 LAB — GLUCOSE SERPL-MCNC: 256 MG/DL (ref 70–110)

## 2020-12-14 PROCEDURE — 3008F BODY MASS INDEX DOCD: CPT | Mod: CPTII,S$GLB,, | Performed by: PHYSICIAN ASSISTANT

## 2020-12-14 PROCEDURE — 99999 PR PBB SHADOW E&M-EST. PATIENT-LVL II: ICD-10-PCS | Mod: PBBFAC,,, | Performed by: DIETITIAN, REGISTERED

## 2020-12-14 PROCEDURE — 99999 PR PBB SHADOW E&M-EST. PATIENT-LVL II: CPT | Mod: PBBFAC,,, | Performed by: DIETITIAN, REGISTERED

## 2020-12-14 PROCEDURE — 3079F PR MOST RECENT DIASTOLIC BLOOD PRESSURE 80-89 MM HG: ICD-10-PCS | Mod: CPTII,S$GLB,, | Performed by: PHYSICIAN ASSISTANT

## 2020-12-14 PROCEDURE — 3008F PR BODY MASS INDEX (BMI) DOCUMENTED: ICD-10-PCS | Mod: CPTII,S$GLB,, | Performed by: PHYSICIAN ASSISTANT

## 2020-12-14 PROCEDURE — 3079F DIAST BP 80-89 MM HG: CPT | Mod: CPTII,S$GLB,, | Performed by: PHYSICIAN ASSISTANT

## 2020-12-14 PROCEDURE — 3077F PR MOST RECENT SYSTOLIC BLOOD PRESSURE >= 140 MM HG: ICD-10-PCS | Mod: CPTII,S$GLB,, | Performed by: PHYSICIAN ASSISTANT

## 2020-12-14 PROCEDURE — 99214 PR OFFICE/OUTPT VISIT, EST, LEVL IV, 30-39 MIN: ICD-10-PCS | Mod: S$GLB,,, | Performed by: PHYSICIAN ASSISTANT

## 2020-12-14 PROCEDURE — 99999 PR PBB SHADOW E&M-EST. PATIENT-LVL III: ICD-10-PCS | Mod: PBBFAC,,, | Performed by: PHYSICIAN ASSISTANT

## 2020-12-14 PROCEDURE — 83036 HEMOGLOBIN GLYCOSYLATED A1C: CPT

## 2020-12-14 PROCEDURE — G0108 DIAB MANAGE TRN  PER INDIV: HCPCS | Mod: S$GLB,,, | Performed by: DIETITIAN, REGISTERED

## 2020-12-14 PROCEDURE — 3046F PR MOST RECENT HEMOGLOBIN A1C LEVEL > 9.0%: ICD-10-PCS | Mod: CPTII,S$GLB,, | Performed by: PHYSICIAN ASSISTANT

## 2020-12-14 PROCEDURE — 3077F SYST BP >= 140 MM HG: CPT | Mod: CPTII,S$GLB,, | Performed by: PHYSICIAN ASSISTANT

## 2020-12-14 PROCEDURE — 3046F HEMOGLOBIN A1C LEVEL >9.0%: CPT | Mod: CPTII,S$GLB,, | Performed by: PHYSICIAN ASSISTANT

## 2020-12-14 PROCEDURE — 36415 COLL VENOUS BLD VENIPUNCTURE: CPT

## 2020-12-14 PROCEDURE — 95251 PR GLUCOSE MONITOR, 72 HOUR, PHYS INTERP: ICD-10-PCS | Mod: S$GLB,,, | Performed by: PHYSICIAN ASSISTANT

## 2020-12-14 PROCEDURE — G0108 PR DIAB MANAGE TRN  PER INDIV: ICD-10-PCS | Mod: S$GLB,,, | Performed by: DIETITIAN, REGISTERED

## 2020-12-14 PROCEDURE — 99214 OFFICE O/P EST MOD 30 MIN: CPT | Mod: S$GLB,,, | Performed by: PHYSICIAN ASSISTANT

## 2020-12-14 PROCEDURE — 99999 PR PBB SHADOW E&M-EST. PATIENT-LVL III: CPT | Mod: PBBFAC,,, | Performed by: PHYSICIAN ASSISTANT

## 2020-12-14 PROCEDURE — 95251 CONT GLUC MNTR ANALYSIS I&R: CPT | Mod: S$GLB,,, | Performed by: PHYSICIAN ASSISTANT

## 2020-12-14 RX ORDER — INSULIN GLARGINE 100 [IU]/ML
37 INJECTION, SOLUTION SUBCUTANEOUS DAILY
Qty: 35 ML | Refills: 3 | Status: SHIPPED | OUTPATIENT
Start: 2020-12-14 | End: 2022-09-28

## 2020-12-14 NOTE — PROGRESS NOTES
PCP: Rosangela Cat MD    Subjective:     Chief Complaint: Diabetes - Established Patient    HISTORY OF PRESENT ILLNESS: 60 y.o.   female presenting for diabetes management visit.   Patient has previously been established with the Diabetes Management Department and was last seen by myself on 10/07/20.   Patient has had Type I diabetes due to low C peptide since 10 years ago.  Pertinent to decision making is the following comorbidities: HTN, HLD and Multinodular goiter s/p thyroid surgery, GENTRY on CPAP, Statin Intolerance, and Medical Noncompliance  Patient has the following Diabetes complications: without complications  She  has attended diabetes education in the past.      Patient's most recent A1c of 13.9% was completed 3 months ago.   Patient states since Her last A1c Her blood glucose levels have been both high and low throughout the day .   Patient monitors blood glucose 4 times per day and Continuously with personal CGM Dexcom.   Patient blood glucose monitoring device will be uploaded into Media [facility] today.  Interpretation of CGM includes an average blood glucose of 197 mg/dL with a standard deviation of 45. GMI of N/A%. Patient's time in range of 35%, time above range of 64%, and time below range of <1%.   Patients records show hyperglycemia at baseline with some correction following postprandial excursions. Patient admits she is missing doses of Novolog 2-3 times per week. Patient also using full Novolog dose to correct overnight.   Patient endorses the following diabetes related symptoms: Polydipsia and Polyuria.   Patient is due today for the following diabetes-related health maintenance standards: Eye Exam and Flu Shot.   She denies recent hospital admissions or emergency room visits. Patient denies known DKA admissions.  She voices having hypoglycemia after correction overnight.   Patient's concerns today include glycemic control.   Patient medication regimen is as below.     CURRENT DM  "MEDICATIONS:    Basaglar 34 units qhs  · Novolog 8 Units with sliding scale three times daily with meals - 15 mins before meals  o BG 61 - 80: -1 units  o BG 81 - 140: +0 units  o  - 180: +1 units  o  - 220: +2 units  o  - 260: +3 units   o  - 300: +4 units  o  - 340: +5 units  o > 340: + 5 units     Patient has failed the following Diabetes medications:    Glucovance       Labs Reviewed.       Lab Results   Component Value Date    CPEPTIDE 0.76 (L) 10/08/2020     Lab Results   Component Value Date    GLUTAMICACID 0.00 10/08/2020       Height: 5' 1" (154.9 cm)  //  Weight: 54.8 kg (120 lb 13 oz), Body mass index is 22.83 kg/m².  Her blood sugar in clinic today is:    Lab Results   Component Value Date    POCGLU 256 (A) 12/14/2020       Review of Systems   Constitutional: Negative for activity change, appetite change, chills and fever.   HENT: Negative for dental problem, mouth sores, nosebleeds, sore throat and trouble swallowing.    Eyes: Negative for pain and discharge.   Respiratory: Negative for shortness of breath, wheezing and stridor.    Cardiovascular: Negative for chest pain, palpitations and leg swelling.   Gastrointestinal: Negative for abdominal pain, diarrhea, nausea and vomiting.   Endocrine: Negative for polydipsia, polyphagia and polyuria.   Genitourinary: Negative for dysuria, frequency and urgency.   Musculoskeletal: Negative for joint swelling and myalgias.   Skin: Negative for rash and wound.   Neurological: Negative for dizziness, syncope, weakness and headaches.   Psychiatric/Behavioral: Negative for behavioral problems and dysphoric mood.         Diabetes Management Status  Statin: Not taking  ACE/ARB: Taking    Screening or Prevention Patient's value Goal Complete/Controlled?   HgA1C Testing and Control   Lab Results   Component Value Date    HGBA1C 13.9 (H) 09/21/2020      Annually/Less than 8% No   Lipid profile : 09/21/2020 Annually Yes   LDL control Lab " Results   Component Value Date    LDLCALC 184.2 (H) 2020    Annually/Less than 100 mg/dl  No   Nephropathy screening Lab Results   Component Value Date    MICALBCREAT 12.3 2020     Lab Results   Component Value Date    PROTEINUA Negative 2020    Annually Yes   Blood pressure BP Readings from Last 1 Encounters:   20 (!) 146/84    Less than 140/90 Yes   Dilated retinal exam : 2017 Annually No    Foot exam   : 10/07/2020 Annually No     Social History     Socioeconomic History    Marital status:      Spouse name: Not on file    Number of children: 3    Years of education: Not on file    Highest education level: Not on file   Occupational History     Employer: [employer]   Social Needs    Financial resource strain: Not on file    Food insecurity     Worry: Not on file     Inability: Not on file    Transportation needs     Medical: Not on file     Non-medical: Not on file   Tobacco Use    Smoking status: Former Smoker     Packs/day: 1.00     Years: 15.00     Pack years: 15.00     Types: Cigarettes     Start date:      Quit date: 2007     Years since quittin.9    Smokeless tobacco: Never Used    Tobacco comment: smoked off and on from [id number] about 15 years total years smoking.   Substance and Sexual Activity    Alcohol use: Yes     Alcohol/week: 21.0 standard drinks     Types: 21 Glasses of wine per week    Drug use: No    Sexual activity: Yes     Partners: Male   Lifestyle    Physical activity     Days per week: Not on file     Minutes per session: Not on file    Stress: Not on file   Relationships    Social connections     Talks on phone: Not on file     Gets together: Not on file     Attends [employer] service: Not on file     Active member of club or organization: Not on file     Attends meetings of clubs or organizations: Not on file     Relationship status: Not on file   Other Topics Concern    Not on file   Social History Narrative     , 3 kids, [employer].     Past Medical History:   Diagnosis Date    Abnormal Pap smear of vagina     Arthritis     Left shoulder    Diabetes mellitus type II     Dr Ayala manages DM     Hyperlipidemia     Hypertension     Thyroid disease     Dr Melissa fontainerioneaux- goitre+       Objective:        Physical Exam  Constitutional:       General: She is not in acute distress.     Appearance: She is well-developed. She is not diaphoretic.   HENT:      Head: Normocephalic and atraumatic.      Right Ear: External ear normal.      Left Ear: External ear normal.      Nose: Nose normal.   Eyes:      General:         Right eye: No discharge.         Left eye: No discharge.      Pupils: Pupils are equal, round, and reactive to light.   Neck:      Musculoskeletal: Normal range of motion and neck supple.   Cardiovascular:      Rate and Rhythm: Normal rate and regular rhythm.      Heart sounds: Normal heart sounds.   Pulmonary:      Effort: Pulmonary effort is normal.      Breath sounds: Normal breath sounds.   Abdominal:      Palpations: Abdomen is soft.   Musculoskeletal: Normal range of motion.   Skin:     General: Skin is warm and dry.      Capillary Refill: Capillary refill takes less than 2 seconds.   Neurological:      Mental Status: She is alert and oriented to person, place, and time.      Motor: No abnormal muscle tone.      Coordination: Coordination normal.   Psychiatric:         Behavior: Behavior normal.         Thought Content: Thought content normal.         Judgment: Judgment normal.           Assessment / Plan:     Type 1 diabetes mellitus with hyperglycemia, with long-term current use of insulin  -     POCT Glucose, Hand-Held Device  -     insulin (BASAGLAR KWIKPEN U-100 INSULIN) glargine 100 units/mL (3mL) SubQ pen; Inject 37 Units into the skin once daily. INJECT 34 UNITS INTO THE SKIN EVERY EVENING.  Dispense: 35 mL; Refill: 3  -     Hemoglobin A1C; Standing    Hypoglycemia due to type 1 diabetes  mellitus  -     POCT Glucose, Hand-Held Device    Non compliance with medical treatment    Hypertension associated with type 1 diabetes mellitus    Hyperlipidemia due to type 1 diabetes mellitus  -     POCT Glucose, Hand-Held Device    Multiple thyroid nodules    History of thyroid surgery    Statin intolerance    GENTRY (obstructive sleep apnea)      Additional Plan Details:    - POCT Glucose  - Encouraged continuation of lifestyle changes including regular exercise and limiting carbohydrates to 30-45 grams per meal threes times daily and 15 grams per snack with a limit of two daily.   - Encouraged continued monitoring of blood glucose with maintenance of 4 times daily and Continuously with personal CGM Dexcom.   - Current DM Medication Regimen: Change Basaglar to 37 units. Continue Novolog meal sized dosing and correction dosing per scale below.   - Health Maintenance standards addressed today: Eye Exam - will be completed outside of [facility] and patient will schedule and Flu Shot - declined  - Nursing Visit: Patient is age 79 or younger with an A1c of 7.5 or greater and will not need nursing visit at this time .   - Follow up in 3 months with A1c prior. Will alternate follow up with CDE.     Meal Size:    - Not Eatin units of Novolog ( + correction )  - Small Meal: 5 units of Novolog ( + correction )  - Medium Meal: 8 units of Novolog ( + correction )  - Large Meal: 10 units of Novolog ( + correction )     To Correct Blood Sugar:   o BG 61 - 80: -1 units  o BG 81 - 140: +0 units  o  - 180: +1 units  o  - 220: +2 units  o  - 260: +3 units   o  - 300: +4 units  o  - 340: +5 units  o > 340: + 5 units       Blakeney McKnight, PA-C [facility]      A total of 30 minutes was spent in face to face time, of which over 50 % was spent in counseling patient on disease process, complications, treatment, and side effects of medications.

## 2020-12-14 NOTE — PATIENT INSTRUCTIONS
CURRENT DM MEDICATIONS:    Basaglar 37 units at night  · Novolog as below - before every meal (3x/day) and every 3 hours as needed     Meal Size:    - Not Eatin units of Novolog ( + correction )  - Small Meal: 5 units of Novolog ( + correction )  - Medium Meal: 8 units of Novolog ( + correction )  - Large Meal: 10 units of Novolog ( + correction )     To Correct Blood Sugar:   o BG 61 - 80: -1 units  o BG 81 - 140: +0 units  o  - 180: +1 units  o  - 220: +2 units  o  - 260: +3 units   o  - 300: +4 units  o  - 340: +5 units  o > 340: + 5 units

## 2020-12-14 NOTE — PROGRESS NOTES
Diabetes Care Specialist Progress Note  Author: Jacob Sauer RD  Date: 12/14/2020    Program Intake  Reason for Diabetes Program Visit:: Initial Diabetes Assessment  Current diabetes risk level:: high  In the last 12 months, have you:: none  Permission to speak with others about care:: no    Clinical    Patient Health Rating  Compared to other people your age, how would you rate your health?: Fair    Problem Review  Reviewed Problem List with Patient: yes  Active comorbidities affecting diabetes self-care.: yes  Comorbidities: Hypertension  Reviewed health maintenance: yes    Clinical Assessment  Current Diabetes Treatment: Diet, Insulin  Have you ever experienced hypoglycemia (low blood sugar)?: yes  In the last month, how often have you experienced low blood sugar?: other (see comments)(2 times per month - usually during the night)  Are you able to tell when your blood sugar is low?: Yes  What symptoms do you experience?: Anxious/nervous, Shaky, Rapid heartbeat  Have you ever been hospitalized because your blood sugar was too low?: no  How do you treat hypoglycemia (low blood sugar)?: 5-6 pieces of hard candy(peppermints or gummy fruit slices)  Have you ever experienced hyperglycemia (high blood sugar)?: yes  Are you able to tell when your blood sugar is high?: once a day  What are your symptoms?: fatigue, other (see comments)(nervous)  Have you ever been hospitalized because your blood sugar was high?: no    Medication Information  How do you obtain your medications?: Patient drives  How many days a week do you miss your medications?: 2(sometimes forgets to take Novolog before meal)  Do you use a pill box or medication chart to help you manage your medications?: No  Do you sometimes have difficulty refilling your medications?: No  Medication adherence impacting ability to self-manage diabetes?: Yes    Labs  Do you have regular lab work to monitor your medications?: Yes  Type of Regular Lab Work: A1c,  Cholesterol, Microalbumin, CBC, BMP  Where do you get your labs drawn?: [facility]  Lab Compliance Barriers: No    Nutritional Status  Diet: Diabetic diet  Change in appetite?: No(poor appetite, but no change recently)  Dentation:: Wears Dentures  Recent Changes in Weight: No Recent Weight Change  Current nutritional status an area of need that is impacting patient's ability to self-manage diabetes?: No    Additional Social History    Support  Does anyone support you with your diabetes care?: no  Comment: : pt is independant  Who takes you to your medical appointments?: patient    Access to Mass Media & Technology  Does the patient have access to any of the following devices or technologies?: Smart phone, Internet Access, Home computer  Media or technology needs impacting ability to self-manage diabetes?: No    Cognitive/Behavioral Health  Alert and Oriented: Yes  Difficulty Thinking: No  Requires Prompting: No  Requires assistance for routine expression?: No  Cognitive or behavioral barriers impacting ability to self-manage diabetes?: No    Culture/Restoration  Culture or Pentecostal beliefs that may impact ability to access healthcare: No    Communication  Language preference: English  Hearing Problems: No  Vision Problems: No  Communication needs impacting ability to self-manage diabetes?: No    Health Literacy  Preferred Learning Method: Face to Face  How often do you need to have someone help you read instructions, pamphlets, or written material from your doctor or pharmacy?: Never  Health literacy needs impacting ability to self-manage diabetes?: No      Diabetes Self-Management Skills Assessment    Diabetes Disease Process/Treatment Options  Patient/caregiver able to state what happens when someone has diabetes.: yes  Patient/caregiver knows what type of diabetes they have.: yes  Diabetes Type : Type I  Patient/caregiver able to identify at least three signs and symptoms of diabetes.: yes  Identified signs and  symptoms:: blurred vision, fatigue, increased thirst  Patient able to identify at least three risk factors for diabetes.: no  Diabetes Disease Process/Treatment Options: Skills Assessment Completed: Yes  Assessment indicates:: Adequate understanding  Area of need?: No    Nutrition/Healthy Eating  Challenges to healthy eating:: other (see comments)(poor appetite //  pt feels she does not eat enough // tries not to eat carbs, because she often eats too much)  Method of carbohydrate measurement:: measuring cups/spoons, plate method  Patient can identify foods that impact blood sugar.: yes  Patient-identified foods:: fruit/fruit juice, starchy vegetables (corn, peas, beans), starches (bread, pasta, rice, cereal), sweets  Nutrition/Healthy Eating Skills Assessment Completed:: Yes  Assessment indicates:: Adequate understanding  Area of need?: Yes    24 hour recall:     Breakfast: 1 cup oatmeal, water     Lunch:  none   Dinner: neno hen, steamed broccoli and cauliflower, water     Snacks:   Midmorning: none  Midafternoon: none  Bedtime: none     Pt often just nibbles on her food and may not finish her meal    Physical Activity/Exercise  Patient's daily activity level:: sedentary  Patient formally exercises outside of work.: no  Reasons for not exercising:: other (see comments)(no motivation to exercise)  Patient can identify forms of physical activity.: yes  Stated forms of physical activity:: any movement performed by muscles that uses energy  Patient can identify reasons why exercise/physical activity is important in diabetes management.: yes  Identified reasons:: helps insulin work better, lowers blood glucose, blood pressure, and cholesterol  Physical Activity/Exercise Skills Assessment Completed: : Yes  Assessment indicates:: Instruction Needed  Area of need?: Yes    Medications  Patient is able to describe current diabetes management routine.: yes  Diabetes management routine:: diet, insulin  Patient is able to  identify current diabetes medications, dosages, and appropriate timing of medications.: yes  Patient understands the purpose of the medications taken for diabetes.: yes  Patient reports problems or concerns with current medication regimen.: no  Medication Skills Assessment Completed:: Yes  Assessment indicates:: Instruction Needed  Area of need?: Yes    CURRENT DM MEDICATIONS:    Basaglar, 34 units - raised to 37 units today    Novolog, 6 units plus SS - new dosing given today   ? BG 61 - 80: -1 units  ? BG 81 - 140: +0 units  ?  - 180: +1 units  ?  - 220: +2 units  ?  - 260: +3 units   ?  - 300: +4 units  ?  - 340: +5 units  ? > 340: + 5 units     Home Blood Glucose Monitoring  Patient states that blood sugar is checked at home daily.: yes  Monitoring Method:: personal continuous glucose monitor  Personal CGM type:: Dexcom  Patient is able to use personal CGM appropriately.: yes  CGM Report reviewed?: yes  Home Blood Glucose Monitoring Skills Assessment Completed: : Yes  Assessment indicates:: Adequate understanding  Area of need?: No    Clarity report in media:  avg B  Time in range: 34%   High:  56%  Very high:  9%  Low:  <1%  Very low:  <1%    Acute Complications  Patient is able to identify types of acute complications: Yes  Patient Identified:: Hypoglycemia, Hyperglycemia, Diabetic Ketoacidosis  Patient is able to state the basic meaning of hypoglycemia?: Yes  Able to state the blood sugar range for hypoglycemia?: yes  Patient stated range:: less than 70  Patient can identify general symptoms of hypoglycemia: yes  Patient identified:: shakiness  Able to state proper treatment of hypoglycemia?: yes  Patient identified:: 1/2 can soda/fruit juice, 5-6 pieces of hard candy  Patient is able to state the basic meaning of hyperglycemia?: Yes  Patient able to state proper treatment of hyperglycemia?: yes  Patient identified:: increase water intake, monitor blood sugar, take  medication as recommended  Patient able to verbalize sick day plan?: no  Acute Complications Skills Assessment Completed: : Yes  Assessment indicates:: Adequate understanding  Area of need?: No    Chronic Complications  Chronic Complications Skills Assessment Completed: : No  Deferred due to:: Time    Psychosocial/Coping  Patient can identify ways of coping with chronic disease.: no (see comments)  Psychosocial/Coping Skills Assessment Completed: : Yes  Assessment indicates:: Other (comment)(Pt is experiencing depression - has been to therapy in the past but stopped going)  Area of need?: Yes      Diabetes Self Support Plan         Assessment Summary and Plan    Based on today's diabetes care assessment, the following areas of need were identified:      Social 12/14/2020   Access to [employer] No   Cognitive/Behavioral Health No   Culture/Pentecostalism No   Communication No   Health Literacy No        Clinical 12/14/2020   Medication Adherence Yes   Lab Compliance No   Nutritional Status No        Diabetes Self-Management Skills 12/14/2020   Diabetes Disease Process/Treatment Options No   Nutrition/Healthy Eating Yes   Physical Activity/Exercise Yes - deferred, pt not ready to make a goal    Medication Yes   Home Blood Glucose Monitoring No   Acute Complications No   Psychosocial/Coping Yes - deferred but recommended f/u with therapist for depression          Today's interventions were provided through individual discussion, instruction, and written materials were provided.      Patient verbalized understanding of instruction and written materials.  Pt was able to return back demonstration of instructions today. Patient understood key points, needs reinforcement and further instruction.     Diabetes Self-Management Care Plan:    Today's Diabetes Self-Management Care Plan was developed with Leesa's input. Leesa has agreed to work toward the following goal(s) to improve his/her overall diabetes control.      Care  Plan: Diabetes Management   Updates made since 2020 12:00 AM      Problem: Healthy Eating       Goal: Eat 2-3 meals daily with 30-45g of Carbohydrate per meal.    Start Date: 2020   Priority: High   Barriers: Lack of Motivation to Change   Note:    Pt can use Glucerna or Boost Glucose Control as meal replacement.     Barrier to change - poor appetite     Task: Reviewed Blood Sugar Goals after eating and explained how carbohydrate sources, nonstarchy vegetables, protein and fat affects blood sugar control. Completed 2020      Task: Discussed the importance of balancing carbohydrates with each meal using portion control techniques to count carbohydrate grams or servings, label reading to identify servings size and amount of total carbohydrate per serving, the plate method, other Completed 2020      Task: Provided visual examples using dry measuring cups, food models, and other familiar objects such as computer mouse, deck or cards, tennis ball etc. to help with visualization of portions. Completed 2020      Task: Recommended replacing beverages containing high sugar content with noncaloric/sugar free options and/or water. Completed 2020      Problem: Medications       Goal: Patient Agrees to take Diabetes Medication(s) as prescribed.       Task: Reviewed with patient his/her daily regimen for taking diabetes medication(s).    Note:    Pt was instructed by Bulmaro Aguilera to increase Basaglar to 37 uints.   Also gave new SS for Novolog.   Gave examples of small, medium and large meals for dosing of Novolog.     Small meal = 5-15 grams of carb  Medium meal = 20-30 grams of carb  Large meal = 45 grams or greater      Meal Size:               - Not Eatin units of Novolog ( + correction )  - Small Meal: 5 units of Novolog ( + correction )  - Medium Meal: 8 units of Novolog ( + correction )  - Large Meal: 10 units of Novolog ( + correction )                 To Correct Blood Sugar:    ? BG 61 - 80: -1 units  ? BG 81 - 140: +0 units  ?  - 180: +1 units  ?  - 220: +2 units  ?  - 260: +3 units   ?  - 300: +4 units  ?  - 340: +5 units  ? > 340: + 5 units        Task: Discussed guidelines for preventing, detecting and treating hypoglycemia and hyperglycemia and reviewed the importance of meal and medication timing with diabetes mediations for prevention of hypoglycemia and maximum drug benefit.           Follow Up Plan     Follow up in about 6 weeks (around 1/25/2021) for diabetes education .   Will alternate appts with Bulmaro Aguilera.     Today's care plan and follow up schedule was discussed with patient.  Leesa verbalized understanding of the care plan, goals, and agrees to follow up plan.        The patient was encouraged to communicate with his/her health care provider/physician and care team regarding his/her condition(s) and treatment.  I provided the patient with my contact information today and encouraged to contact me via phone or [facility]'s Patient Portal as needed.     Length of Visit   Total Time: 0 Minutes

## 2020-12-14 NOTE — PATIENT INSTRUCTIONS
Small meal = 5-15 grams of carb  Medium meal = 20-30 grams of carb  Large meal = 45 grams or greater      Meal Size:               - Not Eatin units of Novolog ( + correction )  - Small Meal: 5 units of Novolog ( + correction )  - Medium Meal: 8 units of Novolog ( + correction )  - Large Meal: 10 units of Novolog ( + correction )                 To Correct Blood Sugar:   ? BG 61 - 80: -1 units  ? BG 81 - 140: +0 units  ?  - 180: +1 units  ?  - 220: +2 units  ?  - 260: +3 units   ?  - 300: +4 units  ?  - 340: +5 units  ? > 340: + 5 units

## 2020-12-15 LAB
ESTIMATED AVG GLUCOSE: 223 MG/DL (ref 68–131)
HBA1C MFR BLD HPLC: 9.4 % (ref 4–5.6)

## 2020-12-18 ENCOUNTER — PATIENT OUTREACH (OUTPATIENT)
Dept: ADMINISTRATIVE | Facility: HOSPITAL | Age: 60
End: 2020-12-18

## 2020-12-18 NOTE — PROGRESS NOTES
Working A1c report:     Pt most recent A1c from 12/2020 is above goal but down from last one in 09/2020. Changed HM modifier to recheck in 3 months. Lab scheduled 03/2020.

## 2020-12-28 ENCOUNTER — OFFICE VISIT (OUTPATIENT)
Dept: PULMONOLOGY | Facility: CLINIC | Age: 60
End: 2020-12-28
Payer: COMMERCIAL

## 2020-12-28 VITALS
WEIGHT: 120.81 LBS | OXYGEN SATURATION: 98 % | BODY MASS INDEX: 22.81 KG/M2 | RESPIRATION RATE: 16 BRPM | HEIGHT: 61 IN | DIASTOLIC BLOOD PRESSURE: 80 MMHG | SYSTOLIC BLOOD PRESSURE: 124 MMHG | HEART RATE: 82 BPM

## 2020-12-28 DIAGNOSIS — I15.2 HYPERTENSION ASSOCIATED WITH TYPE 2 DIABETES MELLITUS: ICD-10-CM

## 2020-12-28 DIAGNOSIS — R10.9 RIGHT-SIDED ABDOMINAL PAIN OF UNKNOWN CAUSE: ICD-10-CM

## 2020-12-28 DIAGNOSIS — G47.33 OSA ON CPAP: Primary | ICD-10-CM

## 2020-12-28 DIAGNOSIS — E11.65 TYPE 2 DIABETES MELLITUS WITH HYPERGLYCEMIA, WITH LONG-TERM CURRENT USE OF INSULIN: ICD-10-CM

## 2020-12-28 DIAGNOSIS — F43.10 PTSD (POST-TRAUMATIC STRESS DISORDER): ICD-10-CM

## 2020-12-28 DIAGNOSIS — F33.9 MAJOR DEPRESSION, RECURRENT, CHRONIC: ICD-10-CM

## 2020-12-28 DIAGNOSIS — Z79.4 TYPE 2 DIABETES MELLITUS WITH HYPERGLYCEMIA, WITH LONG-TERM CURRENT USE OF INSULIN: ICD-10-CM

## 2020-12-28 DIAGNOSIS — E11.59 HYPERTENSION ASSOCIATED WITH TYPE 2 DIABETES MELLITUS: ICD-10-CM

## 2020-12-28 PROCEDURE — 99214 PR OFFICE/OUTPT VISIT, EST, LEVL IV, 30-39 MIN: ICD-10-PCS | Mod: S$GLB,,, | Performed by: NURSE PRACTITIONER

## 2020-12-28 PROCEDURE — 99999 PR PBB SHADOW E&M-EST. PATIENT-LVL IV: ICD-10-PCS | Mod: PBBFAC,,, | Performed by: NURSE PRACTITIONER

## 2020-12-28 PROCEDURE — 3046F HEMOGLOBIN A1C LEVEL >9.0%: CPT | Mod: CPTII,S$GLB,, | Performed by: NURSE PRACTITIONER

## 2020-12-28 PROCEDURE — 3008F BODY MASS INDEX DOCD: CPT | Mod: CPTII,S$GLB,, | Performed by: NURSE PRACTITIONER

## 2020-12-28 PROCEDURE — 3008F PR BODY MASS INDEX (BMI) DOCUMENTED: ICD-10-PCS | Mod: CPTII,S$GLB,, | Performed by: NURSE PRACTITIONER

## 2020-12-28 PROCEDURE — 3074F SYST BP LT 130 MM HG: CPT | Mod: CPTII,S$GLB,, | Performed by: NURSE PRACTITIONER

## 2020-12-28 PROCEDURE — 99999 PR PBB SHADOW E&M-EST. PATIENT-LVL IV: CPT | Mod: PBBFAC,,, | Performed by: NURSE PRACTITIONER

## 2020-12-28 PROCEDURE — 3079F DIAST BP 80-89 MM HG: CPT | Mod: CPTII,S$GLB,, | Performed by: NURSE PRACTITIONER

## 2020-12-28 PROCEDURE — 3046F PR MOST RECENT HEMOGLOBIN A1C LEVEL > 9.0%: ICD-10-PCS | Mod: CPTII,S$GLB,, | Performed by: NURSE PRACTITIONER

## 2020-12-28 PROCEDURE — 99214 OFFICE O/P EST MOD 30 MIN: CPT | Mod: S$GLB,,, | Performed by: NURSE PRACTITIONER

## 2020-12-28 PROCEDURE — 3074F PR MOST RECENT SYSTOLIC BLOOD PRESSURE < 130 MM HG: ICD-10-PCS | Mod: CPTII,S$GLB,, | Performed by: NURSE PRACTITIONER

## 2020-12-28 PROCEDURE — 3079F PR MOST RECENT DIASTOLIC BLOOD PRESSURE 80-89 MM HG: ICD-10-PCS | Mod: CPTII,S$GLB,, | Performed by: NURSE PRACTITIONER

## 2020-12-28 RX ORDER — MELOXICAM 15 MG/1
TABLET ORAL
COMMUNITY
Start: 2020-11-16 | End: 2021-01-08 | Stop reason: ALTCHOICE

## 2020-12-28 RX ORDER — HYDROCODONE BITARTRATE AND ACETAMINOPHEN 7.5; 325 MG/1; MG/1
1 TABLET ORAL EVERY 8 HOURS PRN
COMMUNITY
Start: 2020-12-22 | End: 2021-01-11

## 2020-12-28 NOTE — PROGRESS NOTES
"    Subjective:      Patient ID: Leesa Le is a 60 y.o. female.    Chief Complaint: Sleep Apnea    HPI: Leesa Le presents to clinic for follow up for GENTRY with initial CPAP complaince assessment.  She is on Auto CPAP of 4-20 cmH2O pressure which is optimally controlling sleep apnea with apneic index (AHI) 9.3 events an hour with a large mask leak of 3 hours 23 minutes.   She is compliant with CPAP use. Complaince download today reveals 80.0% of days with greater than 4 hours of device use.   Patient reports benefit from CPAP use. No longer feeling as fatigued since CPAP.  Patient reports no complaints with CPAP over all, states struggles some times, but feels she is "getting there" and wants to continue use of CPAP.   Full face mask is tolerated. She tired the nasal mask and prefers Full face mask.   [place]    Patient has complaint of pins and needles sensation of abdomen x 2 weeks, inquires if related to CPAP, advised not likely and important to follow up with her primary care provider or GI for further evaluation. Advised may try otc pepcid.     Previous Report Reviewed: lab reports and office notes     Past Medical History: The following portions of the patient's history were reviewed and updated as appropriate:   She  has a past surgical history that includes Appendectomy; Thyroidectomy, partial; Tubal ligation; Tumor excision; Cardiac catheterization; Mouth floor mass excision (Bilateral); Colonoscopy (N/A, 6/23/2017); and Breast biopsy (Right).  Her family history includes Alzheimer's disease in her father; Cancer in her maternal aunt; Colon polyps in her maternal uncle; Diabetes in her maternal grandmother and paternal grandmother; Ulcers in her mother.  She  reports that she quit smoking about 13 years ago. Her smoking use included cigarettes. She started smoking about 42 years ago. She has a 15.00 pack-year smoking history. She has never used smokeless tobacco. She reports current alcohol " "use of about 21.0 standard drinks of alcohol per week. She reports that she does not use drugs.  She has a current medication list which includes the following prescription(s): blood sugar diagnostic, hydrocodone-acetaminophen, basaglar kwikpen u-100 insulin, insulin aspart u-100, lancets, lisinopril, meloxicam, pen needle, diabetic, and blood-glucose meter.  She is allergic to penicillins and statins-hmg-coa reductase inhibitors..    The following portions of the patient's history were reviewed and updated as appropriate: allergies, current medications, past family history, past medical history, past social history, past surgical history and problem list.    Review of Systems   Constitutional: Negative for fever, chills, weight loss, weight gain, activity change, appetite change, fatigue and night sweats.   HENT: Negative for postnasal drip, rhinorrhea, sinus pressure, voice change and congestion.    Eyes: Negative for redness and itching.   Respiratory: Negative for snoring, cough, sputum production, chest tightness, shortness of breath, wheezing, orthopnea, asthma nighttime symptoms, dyspnea on extertion, use of rescue inhaler and somnolence.    Cardiovascular: Negative.  Negative for chest pain, palpitations and leg swelling.   Genitourinary: Negative for difficulty urinating and hematuria.   Endocrine: Negative for cold intolerance and heat intolerance.    Musculoskeletal: Negative for arthralgias, gait problem, joint swelling and myalgias.   Skin: Negative.    Gastrointestinal: Positive for abdominal pain (intermittent, right sided ). Negative for nausea, vomiting and acid reflux.   Neurological: Negative for dizziness, weakness, light-headedness and headaches.   Hematological: Negative for adenopathy. No excessive bruising.   All other systems reviewed and are negative.     Objective:   /80   Pulse 82   Resp 16   Ht 5' 1" (1.549 m)   Wt 54.8 kg (120 lb 13 oz)   LMP 06/23/2015   SpO2 98%   BMI " 22.83 kg/m²   Physical Exam  Vitals signs and nursing note reviewed.   Constitutional:       General: She is not in acute distress.     Appearance: She is well-developed. She is not ill-appearing or toxic-appearing.   HENT:      Head: Normocephalic.      Right Ear: External ear normal.      Left Ear: External ear normal.      Nose: Nose normal.      Mouth/Throat:      Pharynx: No oropharyngeal exudate.   Eyes:      Conjunctiva/sclera: Conjunctivae normal.   Neck:      Musculoskeletal: Normal range of motion and neck supple.   Cardiovascular:      Rate and Rhythm: Normal rate and regular rhythm.      Heart sounds: Normal heart sounds.   Pulmonary:      Effort: Pulmonary effort is normal.      Breath sounds: Normal breath sounds. No stridor.   Abdominal:      General: Bowel sounds are normal.      Palpations: Abdomen is soft.      Tenderness: There is abdominal tenderness (right sided) in the periumbilical area. There is no rebound.   Musculoskeletal: Normal range of motion.   Lymphadenopathy:      Cervical: No cervical adenopathy.   Skin:     General: Skin is warm and dry.   Neurological:      Mental Status: She is alert and oriented to person, place, and time.   Psychiatric:         Behavior: Behavior normal. Behavior is cooperative.         Thought Content: Thought content normal.         Judgment: Judgment normal.         Personal Diagnostic Review  CPAP download  APAP 4-20 cm  Compliance Summary  11/22/2020 - 12/21/2020 (30 days)  Days with Device Usage 29 days  Days without Device Usage 1 day  Percent Days with Device Usage 96.7%  Cumulative Usage 6 days 22 hrs. 57 mins. 13 secs.  Maximum Usage (1 Day) 10 hrs. 23 mins. 36 secs.  Average Usage (All Days) 5 hrs. 33 mins. 54 secs.  Average Usage (Days Used) 5 hrs. 45 mins. 25 secs.  Minimum Usage (1 Day) 1 mins. 41 secs.  Percent of Days with Usage >= 4 Hours 80.0%  Percent of Days with Usage < 4 Hours 20.0%  Date Range  Total Blower Time 7 days 2 hrs. 41 mins. 41  secs.  Average AHI 10.6  Auto-CPAP Summary  Auto-CPAP Mean Pressure 4.2 cmH2O  Auto-CPAP Peak Average Pressure 6.1 cmH2O  Average Device Pressure <= 90% of Time 4.8 cmH2O  Average Time in Large Leak Per Day 4 hrs. 8 mins. 33 secs.    Assessment:     1. GENTRY on CPAP    2. Type 2 diabetes mellitus with hyperglycemia, with long-term current use of insulin    3. Major depression, recurrent, chronic    4. Hypertension associated with type 2 diabetes mellitus    5. PTSD (post-traumatic stress disorder)    6. Right-sided abdominal pain of unknown cause        No orders of the defined types were placed in this encounter.    Plan:     Problem List Items Addressed This Visit     Type 2 diabetes mellitus with hyperglycemia, with long-term current use of insulin     Managed by primary care provider   Hemoglobin A1C   Date Value Ref Range Status   12/14/2020 9.4 (H) 4.0 - 5.6 % Final     Comment:     ADA Screening Guidelines:  5.7-6.4%  Consistent with prediabetes  >or=6.5%  Consistent with diabetes  High levels of fetal hemoglobin interfere with the HbA1C  assay. Heterozygous hemoglobin variants (HbS, HgC, etc)do  not significantly interfere with this assay.   However, presence of multiple variants may affect accuracy.     09/21/2020 13.9 (H) 4.0 - 5.6 % Final     Comment:     ADA Screening Guidelines:  5.7-6.4%  Consistent with prediabetes  >or=6.5%  Consistent with diabetes  High levels of fetal hemoglobin interfere with the HbA1C  assay. Heterozygous hemoglobin variants (HbS, HgC, etc)do  not significantly interfere with this assay.   However, presence of multiple variants may affect accuracy.     03/28/2019 >14.0 (H) 4.0 - 5.6 % Final     Comment:     ADA Screening Guidelines:  5.7-6.4%  Consistent with prediabetes  >or=6.5%  Consistent with diabetes  High levels of fetal hemoglobin interfere with the HbA1C  assay. Heterozygous hemoglobin variants (HbS, HgC, etc)do  not significantly interfere with this assay.   However,  presence of multiple variants may affect accuracy.                PTSD (post-traumatic stress disorder)     Diagnosis by psychiatry in about 2017, no longer following with psychiatry. Not currently on medication, symptoms, encouraged considering reestablishing care with psychiatry.          GENTRY on CPAP - Primary     Benefits and compliant with Auto CPAP 4-20 cm  AHI 9.3 with 3 hr 23 minutes  Full face mask   HME: [facility]   Continue adherence  Correct large mask leak          Major depression, recurrent, chronic     Not currently on medication, managed in past by psychiatry.         Hypertension associated with type 2 diabetes mellitus     Controlled, managed by primary care provider            Other Visit Diagnoses     Right-sided abdominal pain of unknown cause        schedule fu with pcp or gI for evaluation, acute over past 2 weeks. no acute distress         (DME) - [facility]  Reviewed therapeutic goals for positive airway pressure therapy Auto CPAP  Ideal is usage 100% of nights for 6 - 8 hours per night. Minimum usage is 70% of night for at least 4 hours per night used.     Follow up in about 6 months (around 6/28/2021) for CPAP 6 mo compliance download.    TIME SPENT WITH PATIENT: Time spent:25 minutes in face to face  discussion concerning diagnosis, prognosis, review of lab and test results, benefits of treatment as well as management of disease, counseling of patient. Greater than half the time spent was used for coordination of care and counseling of patient.

## 2020-12-28 NOTE — ASSESSMENT & PLAN NOTE
Managed by primary care provider   Hemoglobin A1C   Date Value Ref Range Status   12/14/2020 9.4 (H) 4.0 - 5.6 % Final     Comment:     ADA Screening Guidelines:  5.7-6.4%  Consistent with prediabetes  >or=6.5%  Consistent with diabetes  High levels of fetal hemoglobin interfere with the HbA1C  assay. Heterozygous hemoglobin variants (HbS, HgC, etc)do  not significantly interfere with this assay.   However, presence of multiple variants may affect accuracy.     09/21/2020 13.9 (H) 4.0 - 5.6 % Final     Comment:     ADA Screening Guidelines:  5.7-6.4%  Consistent with prediabetes  >or=6.5%  Consistent with diabetes  High levels of fetal hemoglobin interfere with the HbA1C  assay. Heterozygous hemoglobin variants (HbS, HgC, etc)do  not significantly interfere with this assay.   However, presence of multiple variants may affect accuracy.     03/28/2019 >14.0 (H) 4.0 - 5.6 % Final     Comment:     ADA Screening Guidelines:  5.7-6.4%  Consistent with prediabetes  >or=6.5%  Consistent with diabetes  High levels of fetal hemoglobin interfere with the HbA1C  assay. Heterozygous hemoglobin variants (HbS, HgC, etc)do  not significantly interfere with this assay.   However, presence of multiple variants may affect accuracy.

## 2020-12-28 NOTE — ASSESSMENT & PLAN NOTE
Benefits and compliant with Auto CPAP 4-20 cm  AHI 9.3 with 3 hr 23 minutes  Full face mask   HME: [facility]   Continue adherence  Correct large mask leak

## 2021-01-08 ENCOUNTER — HOSPITAL ENCOUNTER (EMERGENCY)
Facility: HOSPITAL | Age: 61
Discharge: HOME | End: 2021-01-08
Attending: FAMILY MEDICINE
Payer: COMMERCIAL

## 2021-01-08 ENCOUNTER — OFFICE VISIT (OUTPATIENT)
Dept: GASTROENTEROLOGY | Facility: CLINIC | Age: 61
End: 2021-01-08
Payer: COMMERCIAL

## 2021-01-08 VITALS
WEIGHT: 115.06 LBS | OXYGEN SATURATION: 100 % | SYSTOLIC BLOOD PRESSURE: 139 MMHG | HEART RATE: 80 BPM | RESPIRATION RATE: 18 BRPM | BODY MASS INDEX: 22.48 KG/M2 | TEMPERATURE: 98 F | DIASTOLIC BLOOD PRESSURE: 78 MMHG

## 2021-01-08 VITALS
SYSTOLIC BLOOD PRESSURE: 150 MMHG | HEIGHT: 60 IN | WEIGHT: 115.06 LBS | HEART RATE: 72 BPM | BODY MASS INDEX: 22.59 KG/M2 | DIASTOLIC BLOOD PRESSURE: 84 MMHG

## 2021-01-08 DIAGNOSIS — R10.9 ABDOMINAL PAIN, UNSPECIFIED ABDOMINAL LOCATION: Primary | ICD-10-CM

## 2021-01-08 DIAGNOSIS — K76.0 FATTY LIVER: Primary | ICD-10-CM

## 2021-01-08 LAB
ALBUMIN SERPL BCP-MCNC: 4.3 G/DL (ref 3.5–5.2)
ALP SERPL-CCNC: 101 U/L (ref 55–135)
ALT SERPL W/O P-5'-P-CCNC: 25 U/L (ref 10–44)
ANION GAP SERPL CALC-SCNC: 12 MMOL/L (ref 8–16)
AST SERPL-CCNC: 18 U/L (ref 10–40)
BASOPHILS # BLD AUTO: 0.08 K/UL (ref 0–0.2)
BASOPHILS NFR BLD: 1.2 % (ref 0–1.9)
BILIRUB SERPL-MCNC: 0.4 MG/DL (ref 0.1–1)
BILIRUB UR QL STRIP: NEGATIVE
BUN SERPL-MCNC: 17 MG/DL (ref 6–20)
CALCIUM SERPL-MCNC: 10.3 MG/DL (ref 8.7–10.5)
CHLORIDE SERPL-SCNC: 99 MMOL/L (ref 95–110)
CLARITY UR: CLEAR
CO2 SERPL-SCNC: 27 MMOL/L (ref 23–29)
COLOR UR: YELLOW
CREAT SERPL-MCNC: 1 MG/DL (ref 0.5–1.4)
DIFFERENTIAL METHOD: ABNORMAL
EOSINOPHIL # BLD AUTO: 0.4 K/UL (ref 0–0.5)
EOSINOPHIL NFR BLD: 5.1 % (ref 0–8)
ERYTHROCYTE [DISTWIDTH] IN BLOOD BY AUTOMATED COUNT: 14.8 % (ref 11.5–14.5)
EST. GFR  (AFRICAN AMERICAN): >60 ML/MIN/1.73 M^2
EST. GFR  (NON AFRICAN AMERICAN): >60 ML/MIN/1.73 M^2
GLUCOSE SERPL-MCNC: 267 MG/DL (ref 70–110)
GLUCOSE UR QL STRIP: ABNORMAL
HCT VFR BLD AUTO: 38.9 % (ref 37–48.5)
HCV AB SERPL QL IA: NEGATIVE
HGB BLD-MCNC: 12.9 G/DL (ref 12–16)
HGB UR QL STRIP: NEGATIVE
IMM GRANULOCYTES # BLD AUTO: 0.01 K/UL (ref 0–0.04)
IMM GRANULOCYTES NFR BLD AUTO: 0.1 % (ref 0–0.5)
KETONES UR QL STRIP: ABNORMAL
LEUKOCYTE ESTERASE UR QL STRIP: NEGATIVE
LIPASE SERPL-CCNC: 29 U/L (ref 4–60)
LYMPHOCYTES # BLD AUTO: 2.6 K/UL (ref 1–4.8)
LYMPHOCYTES NFR BLD: 37.6 % (ref 18–48)
MCH RBC QN AUTO: 24 PG (ref 27–31)
MCHC RBC AUTO-ENTMCNC: 33.2 G/DL (ref 32–36)
MCV RBC AUTO: 72 FL (ref 82–98)
MONOCYTES # BLD AUTO: 0.4 K/UL (ref 0.3–1)
MONOCYTES NFR BLD: 5.1 % (ref 4–15)
NEUTROPHILS # BLD AUTO: 3.5 K/UL (ref 1.8–7.7)
NEUTROPHILS NFR BLD: 50.9 % (ref 38–73)
NITRITE UR QL STRIP: NEGATIVE
NRBC BLD-RTO: 0 /100 WBC
PH UR STRIP: 6 [PH] (ref 5–8)
PLATELET # BLD AUTO: 384 K/UL (ref 150–350)
PMV BLD AUTO: 9.8 FL (ref 9.2–12.9)
POTASSIUM SERPL-SCNC: 4.6 MMOL/L (ref 3.5–5.1)
PROT SERPL-MCNC: 8.7 G/DL (ref 6–8.4)
PROT UR QL STRIP: NEGATIVE
RBC # BLD AUTO: 5.37 M/UL (ref 4–5.4)
SODIUM SERPL-SCNC: 138 MMOL/L (ref 136–145)
SP GR UR STRIP: >=1.03 (ref 1–1.03)
URN SPEC COLLECT METH UR: ABNORMAL
UROBILINOGEN UR STRIP-ACNC: NEGATIVE EU/DL
WBC # BLD AUTO: 6.86 K/UL (ref 3.9–12.7)

## 2021-01-08 PROCEDURE — 3008F BODY MASS INDEX DOCD: CPT | Mod: CPTII,S$GLB,, | Performed by: INTERNAL MEDICINE

## 2021-01-08 PROCEDURE — 80053 COMPREHEN METABOLIC PANEL: CPT

## 2021-01-08 PROCEDURE — 83690 ASSAY OF LIPASE: CPT

## 2021-01-08 PROCEDURE — 99204 OFFICE O/P NEW MOD 45 MIN: CPT | Mod: S$GLB,,, | Performed by: INTERNAL MEDICINE

## 2021-01-08 PROCEDURE — 63600175 PHARM REV CODE 636 W HCPCS: Performed by: FAMILY MEDICINE

## 2021-01-08 PROCEDURE — 81003 URINALYSIS AUTO W/O SCOPE: CPT

## 2021-01-08 PROCEDURE — 1125F AMNT PAIN NOTED PAIN PRSNT: CPT | Mod: S$GLB,,, | Performed by: INTERNAL MEDICINE

## 2021-01-08 PROCEDURE — 1125F PR PAIN SEVERITY QUANTIFIED, PAIN PRESENT: ICD-10-PCS | Mod: S$GLB,,, | Performed by: INTERNAL MEDICINE

## 2021-01-08 PROCEDURE — 86803 HEPATITIS C AB TEST: CPT

## 2021-01-08 PROCEDURE — 3077F PR MOST RECENT SYSTOLIC BLOOD PRESSURE >= 140 MM HG: ICD-10-PCS | Mod: CPTII,S$GLB,, | Performed by: INTERNAL MEDICINE

## 2021-01-08 PROCEDURE — 3077F SYST BP >= 140 MM HG: CPT | Mod: CPTII,S$GLB,, | Performed by: INTERNAL MEDICINE

## 2021-01-08 PROCEDURE — 3008F PR BODY MASS INDEX (BMI) DOCUMENTED: ICD-10-PCS | Mod: CPTII,S$GLB,, | Performed by: INTERNAL MEDICINE

## 2021-01-08 PROCEDURE — 3079F DIAST BP 80-89 MM HG: CPT | Mod: CPTII,S$GLB,, | Performed by: INTERNAL MEDICINE

## 2021-01-08 PROCEDURE — 99285 EMERGENCY DEPT VISIT HI MDM: CPT | Mod: 25

## 2021-01-08 PROCEDURE — 85025 COMPLETE CBC W/AUTO DIFF WBC: CPT

## 2021-01-08 PROCEDURE — 99204 PR OFFICE/OUTPT VISIT, NEW, LEVL IV, 45-59 MIN: ICD-10-PCS | Mod: S$GLB,,, | Performed by: INTERNAL MEDICINE

## 2021-01-08 PROCEDURE — 99999 PR PBB SHADOW E&M-EST. PATIENT-LVL IV: ICD-10-PCS | Mod: PBBFAC,,, | Performed by: INTERNAL MEDICINE

## 2021-01-08 PROCEDURE — 96374 THER/PROPH/DIAG INJ IV PUSH: CPT

## 2021-01-08 PROCEDURE — 99999 PR PBB SHADOW E&M-EST. PATIENT-LVL IV: CPT | Mod: PBBFAC,,, | Performed by: INTERNAL MEDICINE

## 2021-01-08 PROCEDURE — 3079F PR MOST RECENT DIASTOLIC BLOOD PRESSURE 80-89 MM HG: ICD-10-PCS | Mod: CPTII,S$GLB,, | Performed by: INTERNAL MEDICINE

## 2021-01-08 RX ORDER — KETOROLAC TROMETHAMINE 10 MG/1
10 TABLET, FILM COATED ORAL EVERY 6 HOURS PRN
Qty: 12 TABLET | Refills: 0 | Status: SHIPPED | OUTPATIENT
Start: 2021-01-08 | End: 2021-07-12

## 2021-01-08 RX ORDER — KETOROLAC TROMETHAMINE 30 MG/ML
15 INJECTION, SOLUTION INTRAMUSCULAR; INTRAVENOUS
Status: COMPLETED | OUTPATIENT
Start: 2021-01-08 | End: 2021-01-08

## 2021-01-08 RX ADMIN — KETOROLAC TROMETHAMINE 15 MG: 30 INJECTION, SOLUTION INTRAMUSCULAR at 10:01

## 2021-01-11 ENCOUNTER — HOSPITAL ENCOUNTER (OUTPATIENT)
Dept: RADIOLOGY | Facility: HOSPITAL | Age: 61
Discharge: HOME | End: 2021-01-11
Attending: FAMILY MEDICINE
Payer: COMMERCIAL

## 2021-01-11 ENCOUNTER — OFFICE VISIT (OUTPATIENT)
Dept: INTERNAL MEDICINE | Facility: CLINIC | Age: 61
End: 2021-01-11
Payer: COMMERCIAL

## 2021-01-11 VITALS
WEIGHT: 116.63 LBS | TEMPERATURE: 98 F | SYSTOLIC BLOOD PRESSURE: 130 MMHG | HEIGHT: 60 IN | DIASTOLIC BLOOD PRESSURE: 80 MMHG | BODY MASS INDEX: 22.9 KG/M2 | HEART RATE: 80 BPM | OXYGEN SATURATION: 99 %

## 2021-01-11 DIAGNOSIS — K59.04 CHRONIC IDIOPATHIC CONSTIPATION: ICD-10-CM

## 2021-01-11 DIAGNOSIS — E10.59 HYPERTENSION ASSOCIATED WITH TYPE 1 DIABETES MELLITUS: ICD-10-CM

## 2021-01-11 DIAGNOSIS — E10.65 TYPE 1 DIABETES MELLITUS WITH HYPERGLYCEMIA: ICD-10-CM

## 2021-01-11 DIAGNOSIS — Z78.9 STATIN INTOLERANCE: ICD-10-CM

## 2021-01-11 DIAGNOSIS — R10.9 RIGHT SIDED ABDOMINAL PAIN: ICD-10-CM

## 2021-01-11 DIAGNOSIS — F41.8 ANXIETY ASSOCIATED WITH DEPRESSION: ICD-10-CM

## 2021-01-11 DIAGNOSIS — Z98.890 HISTORY OF THYROID SURGERY: ICD-10-CM

## 2021-01-11 DIAGNOSIS — E78.5 HYPERLIPIDEMIA DUE TO TYPE 1 DIABETES MELLITUS: ICD-10-CM

## 2021-01-11 DIAGNOSIS — F33.9 MAJOR DEPRESSION, RECURRENT, CHRONIC: ICD-10-CM

## 2021-01-11 DIAGNOSIS — E04.2 MULTIPLE THYROID NODULES: ICD-10-CM

## 2021-01-11 DIAGNOSIS — E10.69 HYPERLIPIDEMIA DUE TO TYPE 1 DIABETES MELLITUS: ICD-10-CM

## 2021-01-11 DIAGNOSIS — F43.10 PTSD (POST-TRAUMATIC STRESS DISORDER): ICD-10-CM

## 2021-01-11 DIAGNOSIS — E04.2 GOITER, NONTOXIC, MULTINODULAR: ICD-10-CM

## 2021-01-11 DIAGNOSIS — R10.9 RIGHT SIDED ABDOMINAL PAIN: Primary | ICD-10-CM

## 2021-01-11 DIAGNOSIS — G47.33 OSA ON CPAP: ICD-10-CM

## 2021-01-11 DIAGNOSIS — I15.2 HYPERTENSION ASSOCIATED WITH TYPE 1 DIABETES MELLITUS: ICD-10-CM

## 2021-01-11 DIAGNOSIS — K76.0 FATTY LIVER: ICD-10-CM

## 2021-01-11 PROCEDURE — 1125F AMNT PAIN NOTED PAIN PRSNT: CPT | Mod: S$GLB,,, | Performed by: FAMILY MEDICINE

## 2021-01-11 PROCEDURE — 3079F DIAST BP 80-89 MM HG: CPT | Mod: CPTII,S$GLB,, | Performed by: FAMILY MEDICINE

## 2021-01-11 PROCEDURE — 99214 PR OFFICE/OUTPT VISIT, EST, LEVL IV, 30-39 MIN: ICD-10-PCS | Mod: S$GLB,,, | Performed by: FAMILY MEDICINE

## 2021-01-11 PROCEDURE — 3008F BODY MASS INDEX DOCD: CPT | Mod: CPTII,S$GLB,, | Performed by: FAMILY MEDICINE

## 2021-01-11 PROCEDURE — 3075F PR MOST RECENT SYSTOLIC BLOOD PRESS GE 130-139MM HG: ICD-10-PCS | Mod: CPTII,S$GLB,, | Performed by: FAMILY MEDICINE

## 2021-01-11 PROCEDURE — 74019 RADEX ABDOMEN 2 VIEWS: CPT | Mod: 26,,, | Performed by: RADIOLOGY

## 2021-01-11 PROCEDURE — 3008F PR BODY MASS INDEX (BMI) DOCUMENTED: ICD-10-PCS | Mod: CPTII,S$GLB,, | Performed by: FAMILY MEDICINE

## 2021-01-11 PROCEDURE — 3046F HEMOGLOBIN A1C LEVEL >9.0%: CPT | Mod: CPTII,S$GLB,, | Performed by: FAMILY MEDICINE

## 2021-01-11 PROCEDURE — 3046F PR MOST RECENT HEMOGLOBIN A1C LEVEL > 9.0%: ICD-10-PCS | Mod: CPTII,S$GLB,, | Performed by: FAMILY MEDICINE

## 2021-01-11 PROCEDURE — 3079F PR MOST RECENT DIASTOLIC BLOOD PRESSURE 80-89 MM HG: ICD-10-PCS | Mod: CPTII,S$GLB,, | Performed by: FAMILY MEDICINE

## 2021-01-11 PROCEDURE — 74019 XR ABDOMEN FLAT AND ERECT: ICD-10-PCS | Mod: 26,,, | Performed by: RADIOLOGY

## 2021-01-11 PROCEDURE — 3075F SYST BP GE 130 - 139MM HG: CPT | Mod: CPTII,S$GLB,, | Performed by: FAMILY MEDICINE

## 2021-01-11 PROCEDURE — 99214 OFFICE O/P EST MOD 30 MIN: CPT | Mod: S$GLB,,, | Performed by: FAMILY MEDICINE

## 2021-01-11 PROCEDURE — 74019 RADEX ABDOMEN 2 VIEWS: CPT | Mod: TC

## 2021-01-11 PROCEDURE — 99999 PR PBB SHADOW E&M-EST. PATIENT-LVL V: CPT | Mod: PBBFAC,,, | Performed by: FAMILY MEDICINE

## 2021-01-11 PROCEDURE — 99999 PR PBB SHADOW E&M-EST. PATIENT-LVL V: ICD-10-PCS | Mod: PBBFAC,,, | Performed by: FAMILY MEDICINE

## 2021-01-11 PROCEDURE — 1125F PR PAIN SEVERITY QUANTIFIED, PAIN PRESENT: ICD-10-PCS | Mod: S$GLB,,, | Performed by: FAMILY MEDICINE

## 2021-01-11 RX ORDER — DOCUSATE SODIUM 100 MG/1
100 CAPSULE, LIQUID FILLED ORAL 2 TIMES DAILY PRN
Qty: 60 CAPSULE | Refills: 3 | Status: SHIPPED | OUTPATIENT
Start: 2021-01-11 | End: 2021-07-12

## 2021-01-11 RX ORDER — OMEPRAZOLE 20 MG/1
20 CAPSULE, DELAYED RELEASE ORAL DAILY
Qty: 30 CAPSULE | Refills: 3 | Status: SHIPPED | OUTPATIENT
Start: 2021-01-11 | End: 2021-07-12

## 2021-01-13 ENCOUNTER — PATIENT MESSAGE (OUTPATIENT)
Dept: INTERNAL MEDICINE | Facility: CLINIC | Age: 61
End: 2021-01-13

## 2021-01-13 DIAGNOSIS — R76.8 HELICOBACTER PYLORI AB+: Primary | ICD-10-CM

## 2021-01-13 RX ORDER — CLARITHROMYCIN 500 MG/1
500 TABLET, FILM COATED ORAL EVERY 12 HOURS
Qty: 30 TABLET | Refills: 0 | Status: SHIPPED | OUTPATIENT
Start: 2021-01-13 | End: 2021-01-28

## 2021-01-13 RX ORDER — METRONIDAZOLE 500 MG/1
500 TABLET ORAL EVERY 12 HOURS
Qty: 30 TABLET | Refills: 0 | Status: SHIPPED | OUTPATIENT
Start: 2021-01-13 | End: 2021-01-28

## 2021-01-15 ENCOUNTER — PATIENT MESSAGE (OUTPATIENT)
Dept: INTERNAL MEDICINE | Facility: CLINIC | Age: 61
End: 2021-01-15

## 2021-01-19 ENCOUNTER — PATIENT MESSAGE (OUTPATIENT)
Dept: INTERNAL MEDICINE | Facility: CLINIC | Age: 61
End: 2021-01-19

## 2021-01-22 ENCOUNTER — PATIENT OUTREACH (OUTPATIENT)
Dept: ADMINISTRATIVE | Facility: HOSPITAL | Age: 61
End: 2021-01-22

## 2021-01-25 ENCOUNTER — PATIENT OUTREACH (OUTPATIENT)
Dept: ADMINISTRATIVE | Facility: OTHER | Age: 61
End: 2021-01-25

## 2021-06-08 ENCOUNTER — PATIENT MESSAGE (OUTPATIENT)
Dept: ADMINISTRATIVE | Facility: HOSPITAL | Age: 61
End: 2021-06-08

## 2021-07-01 ENCOUNTER — PATIENT MESSAGE (OUTPATIENT)
Dept: ADMINISTRATIVE | Facility: HOSPITAL | Age: 61
End: 2021-07-01

## 2021-07-06 ENCOUNTER — LAB VISIT (OUTPATIENT)
Dept: LAB | Facility: HOSPITAL | Age: 61
End: 2021-07-06
Attending: FAMILY MEDICINE
Payer: COMMERCIAL

## 2021-07-06 DIAGNOSIS — E10.65 TYPE 1 DIABETES MELLITUS WITH HYPERGLYCEMIA, WITH LONG-TERM CURRENT USE OF INSULIN: ICD-10-CM

## 2021-07-06 PROCEDURE — 83036 HEMOGLOBIN GLYCOSYLATED A1C: CPT | Performed by: PHYSICIAN ASSISTANT

## 2021-07-06 PROCEDURE — 36415 COLL VENOUS BLD VENIPUNCTURE: CPT | Performed by: PHYSICIAN ASSISTANT

## 2021-07-07 ENCOUNTER — TELEPHONE (OUTPATIENT)
Dept: DIABETES | Facility: CLINIC | Age: 61
End: 2021-07-07

## 2021-07-07 LAB
ESTIMATED AVG GLUCOSE: 249 MG/DL (ref 68–131)
HBA1C MFR BLD: 10.3 % (ref 4–5.6)

## 2021-07-12 ENCOUNTER — OFFICE VISIT (OUTPATIENT)
Dept: INTERNAL MEDICINE | Facility: CLINIC | Age: 61
End: 2021-07-12
Payer: COMMERCIAL

## 2021-07-12 VITALS
OXYGEN SATURATION: 99 % | BODY MASS INDEX: 22.68 KG/M2 | DIASTOLIC BLOOD PRESSURE: 80 MMHG | TEMPERATURE: 96 F | WEIGHT: 115.5 LBS | HEART RATE: 79 BPM | RESPIRATION RATE: 18 BRPM | SYSTOLIC BLOOD PRESSURE: 132 MMHG | HEIGHT: 60 IN

## 2021-07-12 DIAGNOSIS — Z78.9 STATIN INTOLERANCE: ICD-10-CM

## 2021-07-12 DIAGNOSIS — I15.2 HYPERTENSION ASSOCIATED WITH TYPE 1 DIABETES MELLITUS: Primary | ICD-10-CM

## 2021-07-12 DIAGNOSIS — E04.2 MULTIPLE THYROID NODULES: ICD-10-CM

## 2021-07-12 DIAGNOSIS — G47.33 OSA ON CPAP: ICD-10-CM

## 2021-07-12 DIAGNOSIS — E10.69 HYPERLIPIDEMIA DUE TO TYPE 1 DIABETES MELLITUS: ICD-10-CM

## 2021-07-12 DIAGNOSIS — E10.65 TYPE 1 DIABETES MELLITUS WITH HYPERGLYCEMIA: ICD-10-CM

## 2021-07-12 DIAGNOSIS — E10.59 HYPERTENSION ASSOCIATED WITH TYPE 1 DIABETES MELLITUS: Primary | ICD-10-CM

## 2021-07-12 DIAGNOSIS — Z29.9 PREVENTIVE MEASURE: ICD-10-CM

## 2021-07-12 DIAGNOSIS — F43.10 PTSD (POST-TRAUMATIC STRESS DISORDER): ICD-10-CM

## 2021-07-12 DIAGNOSIS — Z98.890 HISTORY OF THYROID SURGERY: ICD-10-CM

## 2021-07-12 DIAGNOSIS — F41.8 ANXIETY ASSOCIATED WITH DEPRESSION: ICD-10-CM

## 2021-07-12 DIAGNOSIS — E78.5 HYPERLIPIDEMIA DUE TO TYPE 1 DIABETES MELLITUS: ICD-10-CM

## 2021-07-12 PROCEDURE — 3046F HEMOGLOBIN A1C LEVEL >9.0%: CPT | Mod: CPTII,S$GLB,, | Performed by: FAMILY MEDICINE

## 2021-07-12 PROCEDURE — 99999 PR PBB SHADOW E&M-EST. PATIENT-LVL V: CPT | Mod: PBBFAC,,, | Performed by: FAMILY MEDICINE

## 2021-07-12 PROCEDURE — 3008F BODY MASS INDEX DOCD: CPT | Mod: CPTII,S$GLB,, | Performed by: FAMILY MEDICINE

## 2021-07-12 PROCEDURE — 99999 PR PBB SHADOW E&M-EST. PATIENT-LVL V: ICD-10-PCS | Mod: PBBFAC,,, | Performed by: FAMILY MEDICINE

## 2021-07-12 PROCEDURE — 3079F PR MOST RECENT DIASTOLIC BLOOD PRESSURE 80-89 MM HG: ICD-10-PCS | Mod: CPTII,S$GLB,, | Performed by: FAMILY MEDICINE

## 2021-07-12 PROCEDURE — 3046F PR MOST RECENT HEMOGLOBIN A1C LEVEL > 9.0%: ICD-10-PCS | Mod: CPTII,S$GLB,, | Performed by: FAMILY MEDICINE

## 2021-07-12 PROCEDURE — 3079F DIAST BP 80-89 MM HG: CPT | Mod: CPTII,S$GLB,, | Performed by: FAMILY MEDICINE

## 2021-07-12 PROCEDURE — 3008F PR BODY MASS INDEX (BMI) DOCUMENTED: ICD-10-PCS | Mod: CPTII,S$GLB,, | Performed by: FAMILY MEDICINE

## 2021-07-12 PROCEDURE — 3075F PR MOST RECENT SYSTOLIC BLOOD PRESS GE 130-139MM HG: ICD-10-PCS | Mod: CPTII,S$GLB,, | Performed by: FAMILY MEDICINE

## 2021-07-12 PROCEDURE — 99214 PR OFFICE/OUTPT VISIT, EST, LEVL IV, 30-39 MIN: ICD-10-PCS | Mod: S$GLB,,, | Performed by: FAMILY MEDICINE

## 2021-07-12 PROCEDURE — 3075F SYST BP GE 130 - 139MM HG: CPT | Mod: CPTII,S$GLB,, | Performed by: FAMILY MEDICINE

## 2021-07-12 PROCEDURE — 99214 OFFICE O/P EST MOD 30 MIN: CPT | Mod: S$GLB,,, | Performed by: FAMILY MEDICINE

## 2021-07-12 RX ORDER — LINACLOTIDE 145 UG/1
145 CAPSULE, GELATIN COATED ORAL DAILY
COMMUNITY
Start: 2021-07-10 | End: 2022-09-27

## 2021-07-12 RX ORDER — SODIUM, POTASSIUM,MAG SULFATES 17.5-3.13G
SOLUTION, RECONSTITUTED, ORAL ORAL
COMMUNITY
Start: 2021-03-04

## 2021-07-12 RX ORDER — INSULIN ASPART 100 [IU]/ML
12 INJECTION, SOLUTION INTRAVENOUS; SUBCUTANEOUS
Qty: 15 SYRINGE | Refills: 3 | Status: SHIPPED | OUTPATIENT
Start: 2021-07-12 | End: 2022-10-04 | Stop reason: SDUPTHER

## 2021-07-12 RX ORDER — PANTOPRAZOLE SODIUM 40 MG/1
40 TABLET, DELAYED RELEASE ORAL DAILY
COMMUNITY
Start: 2021-07-07 | End: 2022-09-27

## 2021-07-13 ENCOUNTER — PATIENT OUTREACH (OUTPATIENT)
Dept: ADMINISTRATIVE | Facility: OTHER | Age: 61
End: 2021-07-13

## 2021-07-14 ENCOUNTER — CLINICAL SUPPORT (OUTPATIENT)
Dept: DIABETES | Facility: CLINIC | Age: 61
End: 2021-07-14
Payer: COMMERCIAL

## 2021-07-14 VITALS — WEIGHT: 114.19 LBS | BODY MASS INDEX: 22.42 KG/M2 | HEIGHT: 60 IN

## 2021-07-14 DIAGNOSIS — E10.65 TYPE 1 DIABETES MELLITUS WITH HYPERGLYCEMIA, WITH LONG-TERM CURRENT USE OF INSULIN: Primary | ICD-10-CM

## 2021-07-14 PROCEDURE — 99999 PR PBB SHADOW E&M-EST. PATIENT-LVL I: CPT | Mod: PBBFAC,,, | Performed by: DIETITIAN, REGISTERED

## 2021-07-14 PROCEDURE — G0108 DIAB MANAGE TRN  PER INDIV: HCPCS | Mod: S$GLB,,, | Performed by: DIETITIAN, REGISTERED

## 2021-07-14 PROCEDURE — 99999 PR PBB SHADOW E&M-EST. PATIENT-LVL I: ICD-10-PCS | Mod: PBBFAC,,, | Performed by: DIETITIAN, REGISTERED

## 2021-07-14 PROCEDURE — G0108 PR DIAB MANAGE TRN  PER INDIV: ICD-10-PCS | Mod: S$GLB,,, | Performed by: DIETITIAN, REGISTERED

## 2021-09-12 ENCOUNTER — PATIENT OUTREACH (OUTPATIENT)
Dept: ADMINISTRATIVE | Facility: OTHER | Age: 61
End: 2021-09-12

## 2021-09-12 DIAGNOSIS — E11.9 TYPE 2 DIABETES MELLITUS WITHOUT COMPLICATION, UNSPECIFIED WHETHER LONG TERM INSULIN USE: ICD-10-CM

## 2021-09-12 DIAGNOSIS — Z12.31 ENCOUNTER FOR SCREENING MAMMOGRAM FOR MALIGNANT NEOPLASM OF BREAST: Primary | ICD-10-CM

## 2021-09-14 ENCOUNTER — OFFICE VISIT (OUTPATIENT)
Dept: DIABETES | Facility: CLINIC | Age: 61
End: 2021-09-14
Payer: COMMERCIAL

## 2021-09-14 ENCOUNTER — PATIENT MESSAGE (OUTPATIENT)
Dept: DIABETES | Facility: CLINIC | Age: 61
End: 2021-09-14

## 2021-09-14 DIAGNOSIS — Z98.890 HISTORY OF THYROID SURGERY: ICD-10-CM

## 2021-09-14 DIAGNOSIS — E78.5 HYPERLIPIDEMIA DUE TO TYPE 1 DIABETES MELLITUS: ICD-10-CM

## 2021-09-14 DIAGNOSIS — I15.2 HYPERTENSION ASSOCIATED WITH TYPE 1 DIABETES MELLITUS: ICD-10-CM

## 2021-09-14 DIAGNOSIS — E10.65 TYPE 1 DIABETES MELLITUS WITH HYPERGLYCEMIA, WITH LONG-TERM CURRENT USE OF INSULIN: Primary | ICD-10-CM

## 2021-09-14 DIAGNOSIS — Z78.9 STATIN INTOLERANCE: ICD-10-CM

## 2021-09-14 DIAGNOSIS — E10.59 HYPERTENSION ASSOCIATED WITH TYPE 1 DIABETES MELLITUS: ICD-10-CM

## 2021-09-14 DIAGNOSIS — E10.69 HYPERLIPIDEMIA DUE TO TYPE 1 DIABETES MELLITUS: ICD-10-CM

## 2021-09-14 DIAGNOSIS — E10.649 HYPOGLYCEMIA DUE TO TYPE 1 DIABETES MELLITUS: ICD-10-CM

## 2021-09-14 DIAGNOSIS — G47.33 OSA (OBSTRUCTIVE SLEEP APNEA): ICD-10-CM

## 2021-09-14 DIAGNOSIS — E04.2 MULTIPLE THYROID NODULES: ICD-10-CM

## 2021-09-14 PROCEDURE — 3046F PR MOST RECENT HEMOGLOBIN A1C LEVEL > 9.0%: ICD-10-PCS | Mod: CPTII,95,, | Performed by: PHYSICIAN ASSISTANT

## 2021-09-14 PROCEDURE — 4010F ACE/ARB THERAPY RXD/TAKEN: CPT | Mod: CPTII,95,, | Performed by: PHYSICIAN ASSISTANT

## 2021-09-14 PROCEDURE — 3046F HEMOGLOBIN A1C LEVEL >9.0%: CPT | Mod: CPTII,95,, | Performed by: PHYSICIAN ASSISTANT

## 2021-09-14 PROCEDURE — 99214 OFFICE O/P EST MOD 30 MIN: CPT | Mod: 95,,, | Performed by: PHYSICIAN ASSISTANT

## 2021-09-14 PROCEDURE — 4010F PR ACE/ARB THEARPY RXD/TAKEN: ICD-10-PCS | Mod: CPTII,95,, | Performed by: PHYSICIAN ASSISTANT

## 2021-09-14 PROCEDURE — 99214 PR OFFICE/OUTPT VISIT, EST, LEVL IV, 30-39 MIN: ICD-10-PCS | Mod: 95,,, | Performed by: PHYSICIAN ASSISTANT

## 2021-09-21 ENCOUNTER — PATIENT MESSAGE (OUTPATIENT)
Dept: DIABETES | Facility: CLINIC | Age: 61
End: 2021-09-21

## 2021-09-21 ENCOUNTER — PATIENT MESSAGE (OUTPATIENT)
Dept: INTERNAL MEDICINE | Facility: CLINIC | Age: 61
End: 2021-09-21

## 2021-09-22 ENCOUNTER — HOSPITAL ENCOUNTER (OUTPATIENT)
Dept: RADIOLOGY | Facility: HOSPITAL | Age: 61
Discharge: HOME | End: 2021-09-22
Attending: FAMILY MEDICINE
Payer: COMMERCIAL

## 2021-09-22 DIAGNOSIS — Z12.31 ENCOUNTER FOR SCREENING MAMMOGRAM FOR MALIGNANT NEOPLASM OF BREAST: ICD-10-CM

## 2021-09-22 PROCEDURE — 77067 SCR MAMMO BI INCL CAD: CPT | Mod: 26,,, | Performed by: RADIOLOGY

## 2021-09-22 PROCEDURE — 77067 MAMMO DIGITAL SCREENING BILAT WITH TOMO: ICD-10-PCS | Mod: 26,,, | Performed by: RADIOLOGY

## 2021-09-22 PROCEDURE — 77063 MAMMO DIGITAL SCREENING BILAT WITH TOMO: ICD-10-PCS | Mod: 26,,, | Performed by: RADIOLOGY

## 2021-09-22 PROCEDURE — 77063 BREAST TOMOSYNTHESIS BI: CPT | Mod: 26,,, | Performed by: RADIOLOGY

## 2021-09-22 PROCEDURE — 77067 SCR MAMMO BI INCL CAD: CPT | Mod: TC

## 2021-09-30 ENCOUNTER — OFFICE VISIT (OUTPATIENT)
Dept: PSYCHIATRY | Facility: CLINIC | Age: 61
End: 2021-09-30
Payer: COMMERCIAL

## 2021-09-30 DIAGNOSIS — F43.10 PTSD (POST-TRAUMATIC STRESS DISORDER): ICD-10-CM

## 2021-09-30 DIAGNOSIS — F41.8 ANXIETY ASSOCIATED WITH DEPRESSION: ICD-10-CM

## 2021-09-30 PROCEDURE — 90834 PSYTX W PT 45 MINUTES: CPT | Mod: S$GLB,,, | Performed by: SOCIAL WORKER

## 2021-09-30 PROCEDURE — 90834 PR PSYCHOTHERAPY W/PATIENT, 45 MIN: ICD-10-PCS | Mod: S$GLB,,, | Performed by: SOCIAL WORKER

## 2021-09-30 PROCEDURE — 3046F PR MOST RECENT HEMOGLOBIN A1C LEVEL > 9.0%: ICD-10-PCS | Mod: CPTII,S$GLB,, | Performed by: SOCIAL WORKER

## 2021-09-30 PROCEDURE — 4010F ACE/ARB THERAPY RXD/TAKEN: CPT | Mod: CPTII,S$GLB,, | Performed by: SOCIAL WORKER

## 2021-09-30 PROCEDURE — 3046F HEMOGLOBIN A1C LEVEL >9.0%: CPT | Mod: CPTII,S$GLB,, | Performed by: SOCIAL WORKER

## 2021-09-30 PROCEDURE — 99999 PR PBB SHADOW E&M-EST. PATIENT-LVL I: ICD-10-PCS | Mod: PBBFAC,,, | Performed by: SOCIAL WORKER

## 2021-09-30 PROCEDURE — 4010F PR ACE/ARB THEARPY RXD/TAKEN: ICD-10-PCS | Mod: CPTII,S$GLB,, | Performed by: SOCIAL WORKER

## 2021-09-30 PROCEDURE — 99999 PR PBB SHADOW E&M-EST. PATIENT-LVL I: CPT | Mod: PBBFAC,,, | Performed by: SOCIAL WORKER

## 2021-10-05 ENCOUNTER — LAB VISIT (OUTPATIENT)
Dept: LAB | Facility: HOSPITAL | Age: 61
End: 2021-10-05
Attending: FAMILY MEDICINE
Payer: COMMERCIAL

## 2021-10-05 ENCOUNTER — LAB VISIT (OUTPATIENT)
Dept: LAB | Facility: HOSPITAL | Age: 61
End: 2021-10-05
Payer: COMMERCIAL

## 2021-10-05 DIAGNOSIS — E10.59 HYPERTENSION ASSOCIATED WITH TYPE 1 DIABETES MELLITUS: ICD-10-CM

## 2021-10-05 DIAGNOSIS — I15.2 HYPERTENSION ASSOCIATED WITH TYPE 1 DIABETES MELLITUS: ICD-10-CM

## 2021-10-05 DIAGNOSIS — Z29.9 PREVENTIVE MEASURE: ICD-10-CM

## 2021-10-05 DIAGNOSIS — E10.65 TYPE 1 DIABETES MELLITUS WITH HYPERGLYCEMIA: ICD-10-CM

## 2021-10-05 DIAGNOSIS — E10.69 HYPERLIPIDEMIA DUE TO TYPE 1 DIABETES MELLITUS: ICD-10-CM

## 2021-10-05 DIAGNOSIS — E78.5 HYPERLIPIDEMIA DUE TO TYPE 1 DIABETES MELLITUS: ICD-10-CM

## 2021-10-05 LAB
ALBUMIN SERPL BCP-MCNC: 4.1 G/DL (ref 3.5–5.2)
ALBUMIN/CREAT UR: 9.6 UG/MG (ref 0–30)
ALP SERPL-CCNC: 94 U/L (ref 55–135)
ALT SERPL W/O P-5'-P-CCNC: 37 U/L (ref 10–44)
ANION GAP SERPL CALC-SCNC: 21 MMOL/L (ref 8–16)
AST SERPL-CCNC: 20 U/L (ref 10–40)
BASOPHILS # BLD AUTO: 0.06 K/UL (ref 0–0.2)
BASOPHILS NFR BLD: 1.1 % (ref 0–1.9)
BILIRUB SERPL-MCNC: 0.6 MG/DL (ref 0.1–1)
BILIRUB UR QL STRIP: NEGATIVE
BUN SERPL-MCNC: 16 MG/DL (ref 6–20)
CALCIUM SERPL-MCNC: 10.5 MG/DL (ref 8.7–10.5)
CHLORIDE SERPL-SCNC: 105 MMOL/L (ref 95–110)
CHOLEST SERPL-MCNC: 260 MG/DL (ref 120–199)
CHOLEST/HDLC SERPL: 3.8 {RATIO} (ref 2–5)
CLARITY UR: CLEAR
CO2 SERPL-SCNC: 17 MMOL/L (ref 23–29)
COLOR UR: YELLOW
CREAT SERPL-MCNC: 0.8 MG/DL (ref 0.5–1.4)
CREAT UR-MCNC: 156 MG/DL (ref 15–325)
DIFFERENTIAL METHOD: ABNORMAL
EOSINOPHIL # BLD AUTO: 0.2 K/UL (ref 0–0.5)
EOSINOPHIL NFR BLD: 3.5 % (ref 0–8)
ERYTHROCYTE [DISTWIDTH] IN BLOOD BY AUTOMATED COUNT: 16.8 % (ref 11.5–14.5)
EST. GFR  (AFRICAN AMERICAN): >60 ML/MIN/1.73 M^2
EST. GFR  (NON AFRICAN AMERICAN): >60 ML/MIN/1.73 M^2
ESTIMATED AVG GLUCOSE: 169 MG/DL (ref 68–131)
GLUCOSE SERPL-MCNC: 188 MG/DL (ref 70–110)
GLUCOSE UR QL STRIP: NEGATIVE
HBA1C MFR BLD: 7.5 % (ref 4–5.6)
HCT VFR BLD AUTO: 37.4 % (ref 37–48.5)
HDLC SERPL-MCNC: 68 MG/DL (ref 40–75)
HDLC SERPL: 26.2 % (ref 20–50)
HGB BLD-MCNC: 12.4 G/DL (ref 12–16)
HGB UR QL STRIP: NEGATIVE
IMM GRANULOCYTES # BLD AUTO: 0.01 K/UL (ref 0–0.04)
IMM GRANULOCYTES NFR BLD AUTO: 0.2 % (ref 0–0.5)
KETONES UR QL STRIP: NEGATIVE
LDLC SERPL CALC-MCNC: 172.4 MG/DL (ref 63–159)
LEUKOCYTE ESTERASE UR QL STRIP: NEGATIVE
LYMPHOCYTES # BLD AUTO: 2.2 K/UL (ref 1–4.8)
LYMPHOCYTES NFR BLD: 40.4 % (ref 18–48)
MCH RBC QN AUTO: 24.3 PG (ref 27–31)
MCHC RBC AUTO-ENTMCNC: 33.2 G/DL (ref 32–36)
MCV RBC AUTO: 73 FL (ref 82–98)
MICROALBUMIN UR DL<=1MG/L-MCNC: 15 UG/ML
MONOCYTES # BLD AUTO: 0.3 K/UL (ref 0.3–1)
MONOCYTES NFR BLD: 5.9 % (ref 4–15)
NEUTROPHILS # BLD AUTO: 2.7 K/UL (ref 1.8–7.7)
NEUTROPHILS NFR BLD: 48.9 % (ref 38–73)
NITRITE UR QL STRIP: NEGATIVE
NONHDLC SERPL-MCNC: 192 MG/DL
NRBC BLD-RTO: 0 /100 WBC
PH UR STRIP: 6 [PH] (ref 5–8)
PLATELET # BLD AUTO: 307 K/UL (ref 150–450)
PMV BLD AUTO: 11.2 FL (ref 9.2–12.9)
POTASSIUM SERPL-SCNC: 4.2 MMOL/L (ref 3.5–5.1)
PROT SERPL-MCNC: 8.4 G/DL (ref 6–8.4)
PROT UR QL STRIP: NEGATIVE
RBC # BLD AUTO: 5.1 M/UL (ref 4–5.4)
SODIUM SERPL-SCNC: 143 MMOL/L (ref 136–145)
SP GR UR STRIP: 1.02 (ref 1–1.03)
TRIGL SERPL-MCNC: 98 MG/DL (ref 30–150)
TSH SERPL DL<=0.005 MIU/L-ACNC: 1.55 UIU/ML (ref 0.4–4)
URN SPEC COLLECT METH UR: NORMAL
WBC # BLD AUTO: 5.42 K/UL (ref 3.9–12.7)

## 2021-10-05 PROCEDURE — 85025 COMPLETE CBC W/AUTO DIFF WBC: CPT | Performed by: FAMILY MEDICINE

## 2021-10-05 PROCEDURE — 82570 ASSAY OF URINE CREATININE: CPT | Performed by: FAMILY MEDICINE

## 2021-10-05 PROCEDURE — 36415 COLL VENOUS BLD VENIPUNCTURE: CPT | Performed by: FAMILY MEDICINE

## 2021-10-05 PROCEDURE — 80061 LIPID PANEL: CPT | Performed by: FAMILY MEDICINE

## 2021-10-05 PROCEDURE — 80053 COMPREHEN METABOLIC PANEL: CPT | Performed by: FAMILY MEDICINE

## 2021-10-05 PROCEDURE — 81003 URINALYSIS AUTO W/O SCOPE: CPT | Performed by: FAMILY MEDICINE

## 2021-10-05 PROCEDURE — 84443 ASSAY THYROID STIM HORMONE: CPT | Performed by: FAMILY MEDICINE

## 2021-10-05 PROCEDURE — 83036 HEMOGLOBIN GLYCOSYLATED A1C: CPT | Performed by: FAMILY MEDICINE

## 2021-10-12 ENCOUNTER — TELEPHONE (OUTPATIENT)
Dept: DIABETES | Facility: CLINIC | Age: 61
End: 2021-10-12

## 2021-10-28 ENCOUNTER — PATIENT OUTREACH (OUTPATIENT)
Dept: ADMINISTRATIVE | Facility: OTHER | Age: 61
End: 2021-10-28
Payer: COMMERCIAL

## 2021-11-03 DIAGNOSIS — E10.65 TYPE 1 DIABETES MELLITUS WITH HYPERGLYCEMIA, WITH LONG-TERM CURRENT USE OF INSULIN: Primary | ICD-10-CM

## 2021-11-22 ENCOUNTER — TELEPHONE (OUTPATIENT)
Dept: ADMINISTRATIVE | Facility: HOSPITAL | Age: 61
End: 2021-11-22
Payer: COMMERCIAL

## 2022-04-05 ENCOUNTER — PATIENT MESSAGE (OUTPATIENT)
Dept: ADMINISTRATIVE | Facility: HOSPITAL | Age: 62
End: 2022-04-05
Payer: COMMERCIAL

## 2022-04-26 ENCOUNTER — PATIENT MESSAGE (OUTPATIENT)
Dept: ADMINISTRATIVE | Facility: HOSPITAL | Age: 62
End: 2022-04-26
Payer: COMMERCIAL

## 2022-07-05 ENCOUNTER — PATIENT MESSAGE (OUTPATIENT)
Dept: ADMINISTRATIVE | Facility: HOSPITAL | Age: 62
End: 2022-07-05
Payer: COMMERCIAL

## 2022-07-06 DIAGNOSIS — E11.9 TYPE 2 DIABETES MELLITUS WITHOUT COMPLICATION, UNSPECIFIED WHETHER LONG TERM INSULIN USE: ICD-10-CM

## 2022-07-22 ENCOUNTER — TELEPHONE (OUTPATIENT)
Dept: DIABETES | Facility: CLINIC | Age: 62
End: 2022-07-22
Payer: COMMERCIAL

## 2022-07-27 ENCOUNTER — PATIENT MESSAGE (OUTPATIENT)
Dept: ADMINISTRATIVE | Facility: HOSPITAL | Age: 62
End: 2022-07-27
Payer: COMMERCIAL

## 2022-08-04 ENCOUNTER — TELEPHONE (OUTPATIENT)
Dept: INTERNAL MEDICINE | Facility: CLINIC | Age: 62
End: 2022-08-04
Payer: COMMERCIAL

## 2022-08-04 ENCOUNTER — PATIENT OUTREACH (OUTPATIENT)
Dept: ADMINISTRATIVE | Facility: HOSPITAL | Age: 62
End: 2022-08-04
Payer: COMMERCIAL

## 2022-08-04 VITALS — HEART RATE: 89 BPM | SYSTOLIC BLOOD PRESSURE: 126 MMHG | DIASTOLIC BLOOD PRESSURE: 83 MMHG

## 2022-08-04 DIAGNOSIS — E10.65 TYPE 1 DIABETES MELLITUS WITH HYPERGLYCEMIA: Primary | ICD-10-CM

## 2022-08-04 DIAGNOSIS — E10.59 HYPERTENSION ASSOCIATED WITH TYPE 1 DIABETES MELLITUS: ICD-10-CM

## 2022-08-04 DIAGNOSIS — I15.2 HYPERTENSION ASSOCIATED WITH TYPE 1 DIABETES MELLITUS: ICD-10-CM

## 2022-08-04 NOTE — PROGRESS NOTES
Blood Pressure Report: Called Pt to obtain home blood pressure & or schedule PCP Visit. Pt BP is 126/83 pulse 89, Pt accepts appt for 09/27/22 and verbalizes understanding to Fast. Lab orders placed, scheduled, & linked.

## 2022-09-27 ENCOUNTER — LAB VISIT (OUTPATIENT)
Dept: LAB | Facility: HOSPITAL | Age: 62
End: 2022-09-27
Payer: COMMERCIAL

## 2022-09-27 ENCOUNTER — OFFICE VISIT (OUTPATIENT)
Dept: INTERNAL MEDICINE | Facility: CLINIC | Age: 62
End: 2022-09-27
Payer: COMMERCIAL

## 2022-09-27 ENCOUNTER — LAB VISIT (OUTPATIENT)
Dept: LAB | Facility: HOSPITAL | Age: 62
End: 2022-09-27
Attending: FAMILY MEDICINE
Payer: COMMERCIAL

## 2022-09-27 VITALS
SYSTOLIC BLOOD PRESSURE: 138 MMHG | TEMPERATURE: 99 F | OXYGEN SATURATION: 100 % | HEIGHT: 60 IN | HEART RATE: 73 BPM | WEIGHT: 118.81 LBS | DIASTOLIC BLOOD PRESSURE: 80 MMHG | BODY MASS INDEX: 23.32 KG/M2

## 2022-09-27 DIAGNOSIS — E10.59 HYPERTENSION ASSOCIATED WITH TYPE 1 DIABETES MELLITUS: ICD-10-CM

## 2022-09-27 DIAGNOSIS — F33.9 MAJOR DEPRESSION, RECURRENT, CHRONIC: ICD-10-CM

## 2022-09-27 DIAGNOSIS — E10.65 TYPE 1 DIABETES MELLITUS WITH HYPERGLYCEMIA: ICD-10-CM

## 2022-09-27 DIAGNOSIS — Z12.31 SCREENING MAMMOGRAM FOR HIGH-RISK PATIENT: ICD-10-CM

## 2022-09-27 DIAGNOSIS — I15.2 HYPERTENSION ASSOCIATED WITH TYPE 1 DIABETES MELLITUS: ICD-10-CM

## 2022-09-27 DIAGNOSIS — Z00.00 ROUTINE GENERAL MEDICAL EXAMINATION AT A HEALTH CARE FACILITY: Primary | ICD-10-CM

## 2022-09-27 DIAGNOSIS — Z00.00 ROUTINE GENERAL MEDICAL EXAMINATION AT A HEALTH CARE FACILITY: ICD-10-CM

## 2022-09-27 DIAGNOSIS — Z79.4 TYPE 2 DIABETES MELLITUS WITH HYPERGLYCEMIA, WITH LONG-TERM CURRENT USE OF INSULIN: ICD-10-CM

## 2022-09-27 DIAGNOSIS — Z78.9 STATIN INTOLERANCE: ICD-10-CM

## 2022-09-27 DIAGNOSIS — E11.65 TYPE 2 DIABETES MELLITUS WITH HYPERGLYCEMIA, WITH LONG-TERM CURRENT USE OF INSULIN: ICD-10-CM

## 2022-09-27 DIAGNOSIS — F41.8 ANXIETY ASSOCIATED WITH DEPRESSION: ICD-10-CM

## 2022-09-27 DIAGNOSIS — Z98.890 HISTORY OF THYROID SURGERY: ICD-10-CM

## 2022-09-27 DIAGNOSIS — E10.69 HYPERLIPIDEMIA DUE TO TYPE 1 DIABETES MELLITUS: ICD-10-CM

## 2022-09-27 DIAGNOSIS — G47.33 OSA ON CPAP: ICD-10-CM

## 2022-09-27 DIAGNOSIS — F43.10 PTSD (POST-TRAUMATIC STRESS DISORDER): ICD-10-CM

## 2022-09-27 DIAGNOSIS — E78.5 HYPERLIPIDEMIA DUE TO TYPE 1 DIABETES MELLITUS: ICD-10-CM

## 2022-09-27 DIAGNOSIS — E04.2 MULTIPLE THYROID NODULES: ICD-10-CM

## 2022-09-27 LAB
ALBUMIN SERPL BCP-MCNC: 4 G/DL (ref 3.5–5.2)
ALBUMIN/CREAT UR: 14.6 UG/MG (ref 0–30)
ALP SERPL-CCNC: 88 U/L (ref 55–135)
ALT SERPL W/O P-5'-P-CCNC: 29 U/L (ref 10–44)
ANION GAP SERPL CALC-SCNC: 11 MMOL/L (ref 8–16)
AST SERPL-CCNC: 20 U/L (ref 10–40)
BASOPHILS # BLD AUTO: 0.05 K/UL (ref 0–0.2)
BASOPHILS NFR BLD: 1.1 % (ref 0–1.9)
BILIRUB SERPL-MCNC: 0.6 MG/DL (ref 0.1–1)
BILIRUB UR QL STRIP: NEGATIVE
BUN SERPL-MCNC: 12 MG/DL (ref 8–23)
CALCIUM SERPL-MCNC: 9.4 MG/DL (ref 8.7–10.5)
CHLORIDE SERPL-SCNC: 105 MMOL/L (ref 95–110)
CHOLEST SERPL-MCNC: 280 MG/DL (ref 120–199)
CHOLEST/HDLC SERPL: 4.4 {RATIO} (ref 2–5)
CLARITY UR: CLEAR
CO2 SERPL-SCNC: 24 MMOL/L (ref 23–29)
COLOR UR: YELLOW
CREAT SERPL-MCNC: 0.7 MG/DL (ref 0.5–1.4)
CREAT UR-MCNC: 164 MG/DL (ref 15–325)
DIFFERENTIAL METHOD: ABNORMAL
EOSINOPHIL # BLD AUTO: 0.3 K/UL (ref 0–0.5)
EOSINOPHIL NFR BLD: 5.5 % (ref 0–8)
ERYTHROCYTE [DISTWIDTH] IN BLOOD BY AUTOMATED COUNT: 15.7 % (ref 11.5–14.5)
EST. GFR  (NO RACE VARIABLE): >60 ML/MIN/1.73 M^2
ESTIMATED AVG GLUCOSE: 189 MG/DL (ref 68–131)
GLUCOSE SERPL-MCNC: 186 MG/DL (ref 70–110)
GLUCOSE UR QL STRIP: NEGATIVE
HBA1C MFR BLD: 8.2 % (ref 4–5.6)
HCT VFR BLD AUTO: 35.6 % (ref 37–48.5)
HDLC SERPL-MCNC: 64 MG/DL (ref 40–75)
HDLC SERPL: 22.9 % (ref 20–50)
HGB BLD-MCNC: 11.8 G/DL (ref 12–16)
HGB UR QL STRIP: NEGATIVE
IMM GRANULOCYTES # BLD AUTO: 0.01 K/UL (ref 0–0.04)
IMM GRANULOCYTES NFR BLD AUTO: 0.2 % (ref 0–0.5)
KETONES UR QL STRIP: ABNORMAL
LDLC SERPL CALC-MCNC: 198.2 MG/DL (ref 63–159)
LEUKOCYTE ESTERASE UR QL STRIP: NEGATIVE
LYMPHOCYTES # BLD AUTO: 1.7 K/UL (ref 1–4.8)
LYMPHOCYTES NFR BLD: 34.7 % (ref 18–48)
MCH RBC QN AUTO: 25.1 PG (ref 27–31)
MCHC RBC AUTO-ENTMCNC: 33.1 G/DL (ref 32–36)
MCV RBC AUTO: 76 FL (ref 82–98)
MICROALBUMIN UR DL<=1MG/L-MCNC: 24 UG/ML
MONOCYTES # BLD AUTO: 0.3 K/UL (ref 0.3–1)
MONOCYTES NFR BLD: 6.1 % (ref 4–15)
NEUTROPHILS # BLD AUTO: 2.5 K/UL (ref 1.8–7.7)
NEUTROPHILS NFR BLD: 52.4 % (ref 38–73)
NITRITE UR QL STRIP: NEGATIVE
NONHDLC SERPL-MCNC: 216 MG/DL
NRBC BLD-RTO: 0 /100 WBC
PH UR STRIP: 6 [PH] (ref 5–8)
PLATELET # BLD AUTO: 301 K/UL (ref 150–450)
PMV BLD AUTO: 11.8 FL (ref 9.2–12.9)
POTASSIUM SERPL-SCNC: 4.4 MMOL/L (ref 3.5–5.1)
PROT SERPL-MCNC: 7.5 G/DL (ref 6–8.4)
PROT UR QL STRIP: NEGATIVE
RBC # BLD AUTO: 4.7 M/UL (ref 4–5.4)
SODIUM SERPL-SCNC: 140 MMOL/L (ref 136–145)
SP GR UR STRIP: 1.02 (ref 1–1.03)
TRIGL SERPL-MCNC: 89 MG/DL (ref 30–150)
URN SPEC COLLECT METH UR: ABNORMAL
WBC # BLD AUTO: 4.76 K/UL (ref 3.9–12.7)

## 2022-09-27 PROCEDURE — 81003 URINALYSIS AUTO W/O SCOPE: CPT | Performed by: FAMILY MEDICINE

## 2022-09-27 PROCEDURE — 3008F PR BODY MASS INDEX (BMI) DOCUMENTED: ICD-10-PCS | Mod: CPTII,S$GLB,, | Performed by: FAMILY MEDICINE

## 2022-09-27 PROCEDURE — 85025 COMPLETE CBC W/AUTO DIFF WBC: CPT | Performed by: FAMILY MEDICINE

## 2022-09-27 PROCEDURE — 99999 PR PBB SHADOW E&M-EST. PATIENT-LVL IV: CPT | Mod: PBBFAC,,, | Performed by: FAMILY MEDICINE

## 2022-09-27 PROCEDURE — 99999 PR PBB SHADOW E&M-EST. PATIENT-LVL IV: ICD-10-PCS | Mod: PBBFAC,,, | Performed by: FAMILY MEDICINE

## 2022-09-27 PROCEDURE — 3079F PR MOST RECENT DIASTOLIC BLOOD PRESSURE 80-89 MM HG: ICD-10-PCS | Mod: CPTII,S$GLB,, | Performed by: FAMILY MEDICINE

## 2022-09-27 PROCEDURE — 3079F DIAST BP 80-89 MM HG: CPT | Mod: CPTII,S$GLB,, | Performed by: FAMILY MEDICINE

## 2022-09-27 PROCEDURE — 3075F SYST BP GE 130 - 139MM HG: CPT | Mod: CPTII,S$GLB,, | Performed by: FAMILY MEDICINE

## 2022-09-27 PROCEDURE — 82570 ASSAY OF URINE CREATININE: CPT | Performed by: FAMILY MEDICINE

## 2022-09-27 PROCEDURE — 80061 LIPID PANEL: CPT | Performed by: FAMILY MEDICINE

## 2022-09-27 PROCEDURE — 99396 PREV VISIT EST AGE 40-64: CPT | Mod: S$GLB,,, | Performed by: FAMILY MEDICINE

## 2022-09-27 PROCEDURE — 83036 HEMOGLOBIN GLYCOSYLATED A1C: CPT | Performed by: FAMILY MEDICINE

## 2022-09-27 PROCEDURE — 99396 PR PREVENTIVE VISIT,EST,40-64: ICD-10-PCS | Mod: S$GLB,,, | Performed by: FAMILY MEDICINE

## 2022-09-27 PROCEDURE — 80053 COMPREHEN METABOLIC PANEL: CPT | Performed by: FAMILY MEDICINE

## 2022-09-27 PROCEDURE — 3075F PR MOST RECENT SYSTOLIC BLOOD PRESS GE 130-139MM HG: ICD-10-PCS | Mod: CPTII,S$GLB,, | Performed by: FAMILY MEDICINE

## 2022-09-27 PROCEDURE — 82043 UR ALBUMIN QUANTITATIVE: CPT | Performed by: FAMILY MEDICINE

## 2022-09-27 PROCEDURE — 3008F BODY MASS INDEX DOCD: CPT | Mod: CPTII,S$GLB,, | Performed by: FAMILY MEDICINE

## 2022-09-27 RX ORDER — INSULIN ASPART 100 [IU]/ML
INJECTION, SOLUTION INTRAVENOUS; SUBCUTANEOUS
Refills: 3 | OUTPATIENT
Start: 2022-09-27 | End: 2023-09-27

## 2022-09-27 RX ORDER — PEN NEEDLE, DIABETIC 30 GX3/16"
NEEDLE, DISPOSABLE MISCELLANEOUS
Qty: 100 EACH | Refills: 11 | Status: SHIPPED | OUTPATIENT
Start: 2022-09-27 | End: 2023-10-16

## 2022-09-27 NOTE — PROGRESS NOTES
Subjective:       Patient ID: Leesa Le is a 62 y.o. female.    Chief Complaint: Annual Exam    62-year-old  female patient with Patient Active Problem List:     Type 1 diabetes mellitus with hyperglycemia     Statin intolerance     Hyperlipidemia due to type 1 diabetes mellitus     Abnormal ECG     Major depression, recurrent, chronic     Hypertension associated with type 1 diabetes mellitus     Non compliance w medication regimen     Iron deficiency anemia     Anxiety associated with depression     Multiple thyroid nodules     History of thyroid surgery     PTSD (post-traumatic stress disorder)     GENTRY on CPAP     Fatty liver  Reports that patient has been taking NovoLog insulin 6-8 units as needed when having elevated blood glucose levels and has not been taking Basaglar insulin regularly, patient started strict lifestyle changes with diet and exercise and has been monitoring her blood glucose levels which has been running in the range of 80s to 110.   Denies any chest pain or difficulty breathing with palpitations or trouble with swallowing secondary to thyroid nodules      Review of Systems   Constitutional:  Negative for fatigue.   HENT:  Negative for trouble swallowing.    Eyes:  Negative for visual disturbance.   Respiratory:  Negative for shortness of breath.    Cardiovascular:  Negative for chest pain and leg swelling.   Gastrointestinal:  Negative for abdominal pain, nausea and vomiting.   Endocrine: Negative for polydipsia and polyuria.   Musculoskeletal:  Negative for myalgias.   Skin:  Negative for rash.   Neurological:  Negative for light-headedness and headaches.   Psychiatric/Behavioral:  Negative for sleep disturbance.        /80 (BP Location: Right arm, Patient Position: Sitting, BP Method: Medium (Manual))   Pulse 73   Temp 98.7 °F (37.1 °C) (Tympanic)   Ht 5' (1.524 m)   Wt 53.9 kg (118 lb 13.3 oz)   LMP 06/23/2015   SpO2 100%   BMI 23.21 kg/m²   Objective:       Physical Exam  Constitutional:       Appearance: She is well-developed.   HENT:      Head: Normocephalic and atraumatic.   Cardiovascular:      Rate and Rhythm: Normal rate and regular rhythm.      Heart sounds: Normal heart sounds. No murmur heard.  Pulmonary:      Effort: Pulmonary effort is normal.      Breath sounds: Normal breath sounds. No wheezing.   Abdominal:      General: Bowel sounds are normal.      Palpations: Abdomen is soft.      Tenderness: There is no abdominal tenderness.   Skin:     General: Skin is warm and dry.      Findings: No rash.   Neurological:      Mental Status: She is alert and oriented to person, place, and time.   Psychiatric:         Mood and Affect: Mood normal.         Assessment/Plan:   1. Routine general medical examination at a health care facility  - TSH; Future  - Urinalysis, Reflex to Urine Culture Urine, Clean Catch; Future  Vital signs stable today.  Clinical exam stable  Continue lifestyle modifications with low-fat and low-cholesterol diet and exercise 30 minutes daily  Patient refuses further vaccinations    2. Hypertension associated with type 1 diabetes mellitus  - TSH; Future  - Urinalysis, Reflex to Urine Culture Urine, Clean Catch; Future  Currently not taking lisinopril   blood pressure well controlled    3. Hyperlipidemia due to type 1 diabetes mellitus  8. Statin intolerance  Diet controlled    4. Type 1 diabetes mellitus with hyperglycemia  Reports patient has not been taking Basaglar insulin regularly and NovoLog 6-8 units as needed  Will check further labs    5. PTSD (post-traumatic stress disorder)  6. Anxiety associated with depression  7. Major depression, recurrent, chronic  Stable    9. Multiple thyroid nodules  10. History of thyroid surgery    11. GENRTY on CPAP  Stable    12. Screening mammogram for high-risk patient  - Mammo Digital Screening Bilat w/ Mo; Future   Due for mammogram

## 2022-09-27 NOTE — TELEPHONE ENCOUNTER
Care Due:                  Date            Visit Type   Department     Provider  --------------------------------------------------------------------------------                                EP -                              PRIMARY      [facility]  Rosangela Mainampati  Last Visit: 09-      CARE ([facility])   MEDICINE       Cat                              EP -                              PRIMARY      [facility]  Rosangela Mainampati  Next Visit: 03-      CARE ([facility])   MEDICINE       Cat                                                            Last  Test          Frequency    Reason                     Performed    Due Date  --------------------------------------------------------------------------------    Cr..........  12 months..  insulin..................  10-   09-    HBA1C.......  6 months...  insulin..................  10-   04-    [employer] Embedded Care Gaps. Reference number: [id number]. 9/27/2022   9:44:35 AM CDT

## 2022-09-28 DIAGNOSIS — E10.65 TYPE 1 DIABETES MELLITUS WITH HYPERGLYCEMIA, WITH LONG-TERM CURRENT USE OF INSULIN: ICD-10-CM

## 2022-09-28 RX ORDER — INSULIN GLARGINE 100 [IU]/ML
20 INJECTION, SOLUTION SUBCUTANEOUS DAILY
Qty: 35 ML | Refills: 3 | Status: SHIPPED | OUTPATIENT
Start: 2022-09-28 | End: 2023-09-28

## 2022-09-30 ENCOUNTER — PATIENT OUTREACH (OUTPATIENT)
Dept: ADMINISTRATIVE | Facility: HOSPITAL | Age: 62
End: 2022-09-30
Payer: COMMERCIAL

## 2022-09-30 NOTE — PROGRESS NOTES
[employer] eye report: Patient states she will be scheduling an appointment with [facility] due to insurance. Patient states she told PCP that she will bring a copy of the report to the office.

## 2022-10-04 ENCOUNTER — PATIENT OUTREACH (OUTPATIENT)
Dept: ADMINISTRATIVE | Facility: HOSPITAL | Age: 62
End: 2022-10-04
Payer: COMMERCIAL

## 2022-10-04 NOTE — PROGRESS NOTES
PCP: Rosangela Cat MD    Subjective:     Chief Complaint: Diabetes - Established Patient    TELEMEDICINE VISIT:     The patient location is: Home  The chief complaint leading to consultation is: Diabetes Follow up  Visit type: Virtual visit with synchronous audio and video  Total time spent with patient: 15  Each patient to whom he or she provides medical services by telemedicine is:  (1) informed of the relationship between the physician and patient and the respective role of any other health care provider with respect to management of the patient; and (2) notified that he or she may decline to receive medical services by telemedicine and may withdraw from such care at any time.    Notes: N/A      HISTORY OF PRESENT ILLNESS: 62 y.o.   female presenting for diabetes management visit.   The patient's last visit with me was on 9/14/2021.  Patient has had Type I diabetes due to low C peptide since 10 years ago.  Pertinent to decision making is the following comorbidities: HTN, HLD and Multinodular goiter s/p thyroid surgery, GENTRY on CPAP, Statin Intolerance, and Medical Noncompliance  Patient has the following Diabetes complications: without complications  She  has attended diabetes education in the past.      Patient's most recent A1c of  8.2% was completed 1 weeks ago.   Patient states since Her last A1c Her blood glucose levels have been  improved  throughout the day .   Patient monitors blood glucose 4 times per day and Continuously with personal CGM Dexcom.   Patient blood glucose monitoring device will be uploaded into [employer] today.    Patients records show baseline euglycemia and some hypoglycemia and some postprandial hyperglycemia.   Patient endorses the following diabetes related symptoms: None.   Patient is due today for the following diabetes-related health maintenance standards: Foot Exam , Eye Exam, Influenza Vaccine, COVID-19 Vaccine , and Mammogram . Sched for mammogram. Already  had eye outside of [facility].   She denies recent hospital admissions or emergency room visits. Patient denies known DKA admissions.  She denies having hypoglycemia.  Patient's concerns today include glycemic control. Of note, patient reports improving diet and exercise.   Patient medication regimen is as below.     CURRENT DM MEDICATIONS:   Basaglar 30 units at night   Novolog as below - before every meal (3x/day) and every 3 hours as needed             Meal Size:               - Not Eatin units of Novolog ( + correction )  - Carbs with Meal: 5 units of Novolog ( + correction )                 To Correct Blood Sugar (TO add to meal time dose OR to be used as correction after 3 hours if not eating:   BG 61 - 80: -1 units  BG 81 - 120: +0 units   - 140: +1 units   - 180: +2 units   - 200: +3 units    - 220: +4 units   - 240: +5 units   - 260: +6 units   - 280: +7 units   - 300: +8 units   - 320: +9 units   - 340: +10 units   - 380: +11 units   - 400: +12 units  BG +400: +13 units - MAX     Patient has failed the following Diabetes medications:   Glucovance       Labs Reviewed.       Lab Results   Component Value Date    CPEPTIDE 0.76 (L) 10/08/2020     Lab Results   Component Value Date    GLUTAMICACID 0.00 10/08/2020          //   , There is no height or weight on file to calculate BMI.  Her blood sugar in clinic today is:    Lab Results   Component Value Date    POCGLU 256 (A) 2020       Review of Systems   Constitutional:  Negative for activity change, appetite change, chills and fever.   HENT:  Negative for dental problem, mouth sores, nosebleeds, sore throat and trouble swallowing.    Eyes:  Negative for pain and discharge.   Respiratory:  Negative for shortness of breath, wheezing and stridor.    Cardiovascular:  Negative for chest pain, palpitations and leg swelling.   Gastrointestinal:  Negative for abdominal pain, diarrhea,  nausea and vomiting.   Endocrine: Negative for polydipsia, polyphagia and polyuria.   Genitourinary:  Negative for dysuria, frequency and urgency.   Musculoskeletal:  Negative for joint swelling and myalgias.   Skin:  Negative for rash and wound.   Neurological:  Negative for dizziness, syncope, weakness and headaches.   Psychiatric/Behavioral:  Negative for behavioral problems and dysphoric mood.        Diabetes Management Status  Statin: Not taking  ACE/ARB: Taking    Screening or Prevention Patient's value Goal Complete/Controlled?   HgA1C Testing and Control   Lab Results   Component Value Date    HGBA1C 8.2 (H) 09/27/2022      Annually/Less than 8% No   Lipid profile : 09/27/2022 Annually Yes   LDL control Lab Results   Component Value Date    LDLCALC 198.2 (H) 09/27/2022    Annually/Less than 100 mg/dl  No   Nephropathy screening Lab Results   Component Value Date    MICALBCREAT 14.6 09/27/2022     Lab Results   Component Value Date    PROTEINUA Negative 09/27/2022    Annually Yes   Blood pressure BP Readings from Last 1 Encounters:   09/27/22 138/80    Less than 140/90 Yes   Dilated retinal exam : 01/16/2017 Annually No    Foot exam   : 12/14/2020 Annually No     Social History     Socioeconomic History    Marital status:     Number of children: 3   Occupational History     Employer: [employer]   Tobacco Use    Smoking status: Former     Packs/day: 1.00     Years: 15.00     Pack years: 15.00     Types: Cigarettes     Start date: 1979     Quit date: 1/9/2007     Years since quitting: 15.7    Smokeless tobacco: Never    Tobacco comments:     smoked off and on from [id number] about 15 years total years smoking.   Substance and Sexual Activity    Alcohol use: Yes     Alcohol/week: 21.0 standard drinks     Types: 21 Glasses of wine per week    Drug use: No    Sexual activity: Yes     Partners: Male   Social History Narrative    , 3 kids, [employer].     Social Determinants of Health      Financial Resource Strain: Low Risk     Difficulty of Paying Living Expenses: Not hard at all   Food Insecurity: No Food Insecurity    Worried About Running Out of Food in the Last Year: Never true    Ran Out of Food in the Last Year: Never true   Transportation Needs: No Transportation Needs    Lack of Transportation (Medical): No    Lack of Transportation (Non-Medical): No   Physical Activity: Insufficiently Active    Days of Exercise per Week: 1 day    Minutes of Exercise per Session: 20 min   Stress: Stress Concern Present    Feeling of Stress : Very much   Social Connections: Unknown    Frequency of Communication with Friends and Family: Twice a week    Frequency of Social Gatherings with Friends and Family: Never    Active Member of Clubs or Organizations: No    Attends Club or Organization Meetings: Never    Marital Status:    Housing Stability: Low Risk     Unable to Pay for Housing in the Last Year: No    Number of Places Lived in the Last Year: 1    Unstable Housing in the Last Year: No     Past Medical History:   Diagnosis Date    Abnormal Pap smear of vagina     Arthritis     Left shoulder    Diabetes mellitus type II     Dr Ayala manages DM     Hyperlipidemia     Hypertension     Thyroid disease     Dr Melissa fontainerioneaux- goitre+       Objective:        Physical Exam  Constitutional:       General: She is not in acute distress.     Appearance: She is well-developed. She is not diaphoretic.   HENT:      Head: Normocephalic and atraumatic.      Right Ear: External ear normal.      Left Ear: External ear normal.      Nose: Nose normal.   Eyes:      General:         Right eye: No discharge.         Left eye: No discharge.   Pulmonary:      Effort: Pulmonary effort is normal. No respiratory distress.      Breath sounds: No stridor.   Musculoskeletal:         General: Normal range of motion.      Cervical back: Normal range of motion.   Skin:     Coloration: Skin is not pale.   Neurological:       Mental Status: She is alert and oriented to person, place, and time.      Motor: No abnormal muscle tone.      Coordination: Coordination normal.   Psychiatric:         Behavior: Behavior normal.         Thought Content: Thought content normal.         Judgment: Judgment normal.         Assessment / Plan:     Type 1 diabetes mellitus with hyperglycemia, with long-term current use of insulin  -     Hemoglobin A1C; Future; Expected date: 10/05/2022  -     C-Peptide; Future; Expected date: 10/05/2022  -     Glucose, Fasting; Future; Expected date: 10/05/2022    Hypoglycemia due to type 1 diabetes mellitus    Hypertension associated with type 1 diabetes mellitus    Hyperlipidemia due to type 1 diabetes mellitus    History of thyroid surgery    Multiple thyroid nodules    Statin intolerance    GENTRY (obstructive sleep apnea)    Additional Plan Details:    - POCT Glucose  - Encouraged continuation of lifestyle changes including regular exercise and limiting carbohydrates to 30-45 grams per meal threes times daily and 15 grams per snack with a limit of two daily.   - Encouraged continued monitoring of blood glucose with maintenance of 4 times daily and Continuously with personal CGM Dexcom.   - Current DM Medication Regimen: Continue Basaglar 30 units. Continue Novolog meal sized dosing and correction dosing per scale below.   - Fasting labs scheduled  - Health Maintenance standards addressed today: Foot Exam - deferred by patient today because Telemedicine or Telephone visit, Eye Exam - will be completed outside of [facility] and patient will schedule, Flu Shot - to be scheduled today within [facility], COVID - 19 Vaccine - patient will schedule outside of [facility] , and Mammogram  - Nursing Visit: Patient is age 79 or younger with an A1c of 7.5 or greater and will not need nursing visit at this time .   - Follow up in 3 months with A1c prior.     Meal Size:               - Not Eatin units of Novolog ( + correction )  - Carbs  with Meal: 5 units of Novolog ( + correction )                 To Correct Blood Sugar (TO add to meal time dose OR to be used as correction after 3 hours if not eating:   BG 61 - 80: -1 units  BG 81 - 120: +0 units   - 140: +1 units   - 180: +2 units   - 200: +3 units    - 220: +4 units   - 240: +5 units   - 260: +6 units   - 280: +7 units   - 300: +8 units   - 320: +9 units   - 340: +10 units   - 380: +11 units   - 400: +12 units  BG +400: +13 units - MAX    Blakeney McKnight, PA-C [facility]

## 2022-10-05 ENCOUNTER — OFFICE VISIT (OUTPATIENT)
Dept: DIABETES | Facility: CLINIC | Age: 62
End: 2022-10-05
Payer: COMMERCIAL

## 2022-10-05 DIAGNOSIS — E10.69 HYPERLIPIDEMIA DUE TO TYPE 1 DIABETES MELLITUS: ICD-10-CM

## 2022-10-05 DIAGNOSIS — E04.2 MULTIPLE THYROID NODULES: ICD-10-CM

## 2022-10-05 DIAGNOSIS — Z78.9 STATIN INTOLERANCE: ICD-10-CM

## 2022-10-05 DIAGNOSIS — G47.33 OSA (OBSTRUCTIVE SLEEP APNEA): ICD-10-CM

## 2022-10-05 DIAGNOSIS — Z98.890 HISTORY OF THYROID SURGERY: ICD-10-CM

## 2022-10-05 DIAGNOSIS — E78.5 HYPERLIPIDEMIA DUE TO TYPE 1 DIABETES MELLITUS: ICD-10-CM

## 2022-10-05 DIAGNOSIS — E10.65 TYPE 1 DIABETES MELLITUS WITH HYPERGLYCEMIA, WITH LONG-TERM CURRENT USE OF INSULIN: Primary | ICD-10-CM

## 2022-10-05 DIAGNOSIS — I15.2 HYPERTENSION ASSOCIATED WITH TYPE 1 DIABETES MELLITUS: ICD-10-CM

## 2022-10-05 DIAGNOSIS — E10.649 HYPOGLYCEMIA DUE TO TYPE 1 DIABETES MELLITUS: ICD-10-CM

## 2022-10-05 DIAGNOSIS — E10.59 HYPERTENSION ASSOCIATED WITH TYPE 1 DIABETES MELLITUS: ICD-10-CM

## 2022-10-05 PROCEDURE — 3061F NEG MICROALBUMINURIA REV: CPT | Mod: CPTII,95,, | Performed by: PHYSICIAN ASSISTANT

## 2022-10-05 PROCEDURE — 3066F NEPHROPATHY DOC TX: CPT | Mod: CPTII,95,, | Performed by: PHYSICIAN ASSISTANT

## 2022-10-05 PROCEDURE — 3061F PR NEG MICROALBUMINURIA RESULT DOCUMENTED/REVIEW: ICD-10-PCS | Mod: CPTII,95,, | Performed by: PHYSICIAN ASSISTANT

## 2022-10-05 PROCEDURE — 3052F PR MOST RECENT HEMOGLOBIN A1C LEVEL 8.0 - < 9.0%: ICD-10-PCS | Mod: CPTII,95,, | Performed by: PHYSICIAN ASSISTANT

## 2022-10-05 PROCEDURE — 3066F PR DOCUMENTATION OF TREATMENT FOR NEPHROPATHY: ICD-10-PCS | Mod: CPTII,95,, | Performed by: PHYSICIAN ASSISTANT

## 2022-10-05 PROCEDURE — 3052F HG A1C>EQUAL 8.0%<EQUAL 9.0%: CPT | Mod: CPTII,95,, | Performed by: PHYSICIAN ASSISTANT

## 2022-10-05 PROCEDURE — 99214 PR OFFICE/OUTPT VISIT, EST, LEVL IV, 30-39 MIN: ICD-10-PCS | Mod: 95,,, | Performed by: PHYSICIAN ASSISTANT

## 2022-10-05 PROCEDURE — 99214 OFFICE O/P EST MOD 30 MIN: CPT | Mod: 95,,, | Performed by: PHYSICIAN ASSISTANT

## 2022-10-05 NOTE — PATIENT INSTRUCTIONS
CURRENT DM MEDICATIONS:   Basaglar 30 units at night   Novolog as below - before every meal (3x/day) and every 3 hours as needed             Meal Size:               - Not Eatin units of Novolog ( + correction )  - Carbs with Meal: 5 units of Novolog ( + correction )                 To Correct Blood Sugar (TO add to meal time dose OR to be used as correction after 3 hours if not eating:   BG 61 - 80: -1 units  BG 81 - 120: +0 units   - 140: +1 units   - 180: +2 units   - 200: +3 units    - 220: +4 units   - 240: +5 units   - 260: +6 units   - 280: +7 units   - 300: +8 units   - 320: +9 units   - 340: +10 units   - 380: +11 units   - 400: +12 units  BG +400: +13 units - MAX

## 2022-10-05 NOTE — Clinical Note
3 mo in person   A1c, C peptide, Fasting glucose  - FASTING labs 1 week before visit.   NV 10/12 2:15 p NV - drop off eye exam

## 2022-10-18 ENCOUNTER — PATIENT OUTREACH (OUTPATIENT)
Dept: ADMINISTRATIVE | Facility: HOSPITAL | Age: 62
End: 2022-10-18
Payer: COMMERCIAL

## 2022-10-18 ENCOUNTER — PATIENT MESSAGE (OUTPATIENT)
Dept: ADMINISTRATIVE | Facility: HOSPITAL | Age: 62
End: 2022-10-18
Payer: COMMERCIAL

## 2022-11-02 ENCOUNTER — HOSPITAL ENCOUNTER (OUTPATIENT)
Dept: RADIOLOGY | Facility: HOSPITAL | Age: 62
Discharge: HOME | End: 2022-11-02
Attending: FAMILY MEDICINE
Payer: COMMERCIAL

## 2022-11-02 DIAGNOSIS — Z12.31 SCREENING MAMMOGRAM FOR HIGH-RISK PATIENT: ICD-10-CM

## 2022-11-02 PROCEDURE — 77063 BREAST TOMOSYNTHESIS BI: CPT | Mod: 26,,, | Performed by: RADIOLOGY

## 2022-11-02 PROCEDURE — 77067 SCR MAMMO BI INCL CAD: CPT | Mod: TC

## 2022-11-02 PROCEDURE — 77063 MAMMO DIGITAL SCREENING BILAT WITH TOMO: ICD-10-PCS | Mod: 26,,, | Performed by: RADIOLOGY

## 2022-11-02 PROCEDURE — 77067 MAMMO DIGITAL SCREENING BILAT WITH TOMO: ICD-10-PCS | Mod: 26,,, | Performed by: RADIOLOGY

## 2022-11-02 PROCEDURE — 77067 SCR MAMMO BI INCL CAD: CPT | Mod: 26,,, | Performed by: RADIOLOGY

## 2022-11-02 PROCEDURE — 77063 BREAST TOMOSYNTHESIS BI: CPT | Mod: TC

## 2023-01-03 ENCOUNTER — PATIENT OUTREACH (OUTPATIENT)
Dept: ADMINISTRATIVE | Facility: HOSPITAL | Age: 63
End: 2023-01-03
Payer: COMMERCIAL

## 2023-01-03 NOTE — PROGRESS NOTES
DM lab report: Per chart review, patient has a lab appointment on 1/6/23 for recheck of Hemoglobin A1c.

## 2023-01-11 ENCOUNTER — PATIENT OUTREACH (OUTPATIENT)
Dept: ADMINISTRATIVE | Facility: HOSPITAL | Age: 63
End: 2023-01-11
Payer: COMMERCIAL

## 2023-01-25 ENCOUNTER — PATIENT MESSAGE (OUTPATIENT)
Dept: ADMINISTRATIVE | Facility: HOSPITAL | Age: 63
End: 2023-01-25
Payer: COMMERCIAL

## 2023-02-23 ENCOUNTER — PATIENT OUTREACH (OUTPATIENT)
Dept: ADMINISTRATIVE | Facility: HOSPITAL | Age: 63
End: 2023-02-23
Payer: COMMERCIAL

## 2023-02-24 ENCOUNTER — PATIENT MESSAGE (OUTPATIENT)
Dept: DIABETES | Facility: CLINIC | Age: 63
End: 2023-02-24
Payer: COMMERCIAL

## 2023-04-05 ENCOUNTER — TELEPHONE (OUTPATIENT)
Dept: DIABETES | Facility: CLINIC | Age: 63
End: 2023-04-05

## 2023-04-05 ENCOUNTER — PATIENT MESSAGE (OUTPATIENT)
Dept: DIABETES | Facility: CLINIC | Age: 63
End: 2023-04-05

## 2023-04-05 ENCOUNTER — OFFICE VISIT (OUTPATIENT)
Dept: DIABETES | Facility: CLINIC | Age: 63
End: 2023-04-05
Payer: COMMERCIAL

## 2023-04-05 DIAGNOSIS — I15.2 HYPERTENSION ASSOCIATED WITH TYPE 1 DIABETES MELLITUS: ICD-10-CM

## 2023-04-05 DIAGNOSIS — E10.649 HYPOGLYCEMIA DUE TO TYPE 1 DIABETES MELLITUS: ICD-10-CM

## 2023-04-05 DIAGNOSIS — E78.5 HYPERLIPIDEMIA DUE TO TYPE 1 DIABETES MELLITUS: ICD-10-CM

## 2023-04-05 DIAGNOSIS — E10.65 TYPE 1 DIABETES MELLITUS WITH HYPERGLYCEMIA, WITH LONG-TERM CURRENT USE OF INSULIN: Primary | ICD-10-CM

## 2023-04-05 DIAGNOSIS — G47.33 OSA (OBSTRUCTIVE SLEEP APNEA): ICD-10-CM

## 2023-04-05 DIAGNOSIS — Z98.890 HISTORY OF THYROID SURGERY: ICD-10-CM

## 2023-04-05 DIAGNOSIS — Z78.9 STATIN INTOLERANCE: ICD-10-CM

## 2023-04-05 DIAGNOSIS — E10.65 TYPE 1 DIABETES MELLITUS WITH HYPERGLYCEMIA: ICD-10-CM

## 2023-04-05 DIAGNOSIS — E10.59 HYPERTENSION ASSOCIATED WITH TYPE 1 DIABETES MELLITUS: ICD-10-CM

## 2023-04-05 DIAGNOSIS — E04.2 MULTIPLE THYROID NODULES: ICD-10-CM

## 2023-04-05 DIAGNOSIS — E10.69 HYPERLIPIDEMIA DUE TO TYPE 1 DIABETES MELLITUS: ICD-10-CM

## 2023-04-05 PROCEDURE — 99214 PR OFFICE/OUTPT VISIT, EST, LEVL IV, 30-39 MIN: ICD-10-PCS | Mod: 95,,, | Performed by: PHYSICIAN ASSISTANT

## 2023-04-05 PROCEDURE — 99214 OFFICE O/P EST MOD 30 MIN: CPT | Mod: 95,,, | Performed by: PHYSICIAN ASSISTANT

## 2023-04-05 RX ORDER — INSULIN ASPART 100 [IU]/ML
12 INJECTION, SOLUTION INTRAVENOUS; SUBCUTANEOUS
Qty: 15 EACH | Refills: 3 | Status: SHIPPED | OUTPATIENT
Start: 2023-04-05 | End: 2023-10-16 | Stop reason: SDUPTHER

## 2023-04-05 NOTE — TELEPHONE ENCOUNTER
"----- Message from Bulmaro Aguilera PA-C sent at 4/5/2023  4:42 PM CDT -----  Okay call pt to see if wants a diff pharmacy and pend rx     ----- Message -----  From: Verena Gaming MA  Sent: 4/5/2023   4:30 PM CDT  To: Bulmaro Aguilera PA-C    I did and that is the one that is temporarily closed for now. The  did not say why they are closed   ----- Message -----  From: Bulmaro Aguilera PA-C  Sent: 4/5/2023   4:27 PM CDT  To: Verena Gaming MA    You can click on the meds and see where I sent it and make sure [facility]. If she wants it sent somewhere else I need to know where. Can pend to whichever pharmacy     ----- Message -----  From: Verena Gaming MA  Sent: 4/5/2023   4:17 PM CDT  To: Bulmaro Aguilera PA-C    That [facility] is temporally closed. Is there another pharmacy that you perhaps sent it to  ----- Message -----  From: Bulmaro Aguilera PA-C  Sent: 4/5/2023   3:42 PM CDT  To: Verena Gaming MA    Please retry pharmacy  ----- Message -----  From: Verena Gaming MA  Sent: 4/5/2023   2:00 PM CDT  To: Bulmaro Aguilera PA-C    Pharmacy is on lunch and pt said that she has not found out who her eye dr is but she will call us and let us know when she dose  ----- Message -----  From: Bulmaro Aguilera PA-C  Sent: 4/5/2023   1:40 PM CDT  To: Willy Chamberlain Staff    4-6 week virtual "Clarity"  G7 order to [facility]   Fasting labs Monday at [facility] at 8a  Novolog Rx ? Does it need formulary alternatice   Eye doctor records (she was going to get name of eye doct)                "

## 2023-04-05 NOTE — PROGRESS NOTES
PCP: Rosangela Cat MD    Subjective:     Chief Complaint: Diabetes - Established Patient    TELEMEDICINE VISIT:     The patient location is: Home  The chief complaint leading to consultation is: Diabetes Follow up  Visit type: Virtual visit with synchronous audio and video  Total time spent with patient: 22  Each patient to whom he or she provides medical services by telemedicine is:  (1) informed of the relationship between the physician and patient and the respective role of any other health care provider with respect to management of the patient; and (2) notified that he or she may decline to receive medical services by telemedicine and may withdraw from such care at any time.    Notes: N/A      HISTORY OF PRESENT ILLNESS: 63 y.o.   female presenting for diabetes management visit.   The patient's last visit with me was on 10/5/2022.  Patient has had Type I diabetes due to low C peptide since 10 years ago.  Pertinent to decision making is the following comorbidities: HTN, HLD and Multinodular goiter s/p thyroid surgery, GENTRY on CPAP, Statin Intolerance, and Medical Noncompliance  Patient has the following Diabetes complications: without complications  She  has attended diabetes education in the past.      Patient's most recent A1c of  8.2% was completed 6 months ago.   Patient states since Her last A1c Her blood glucose levels have been  improved  throughout the day .   Patient monitors blood glucose 4 times per day and Continuously with personal CGM Dexcom.   Patient blood glucose monitoring device will be uploaded into [employer] [facility] today.    Patients records show baseline euglycemia and postprandial hyperglycemia throughout the day.   Patient endorses the following diabetes related symptoms:  Depression and Anxiety . Previously seeing therapy and psychiatry. Does not like medication side effects. Is not ready to resume therapy.   Patient is due today for the following diabetes-related health  maintenance standards: Foot Exam , Eye Exam, A1c, Influenza Vaccine, and COVID-19 Vaccine . Completed eye exam outside of [facility]. Will get name so we can get records.   She denies recent hospital admissions or emergency room visits. Patient denies known DKA admissions.  She denies having hypoglycemia.  Patient's concerns today include glycemic control. Of note, patient reports improving diet and exercise.   Patient medication regimen is as below.     CURRENT DM MEDICATIONS:   Basaglar 30 units at night   Novolog as below - before every meal (3x/day) and every 3 hours as needed - formulary issue since Oct             Meal Size:               - Not Eatin units of Novolog ( + correction )  - Carbs with Meal: 5 units of Novolog ( + correction )                 To Correct Blood Sugar (TO add to meal time dose OR to be used as correction after 3 hours if not eating:   BG 61 - 80: -1 units  BG 81 - 120: +0 units   - 140: +1 units   - 180: +2 units   - 200: +3 units    - 220: +4 units   - 240: +5 units   - 260: +6 units   - 280: +7 units   - 300: +8 units   - 320: +9 units   - 340: +10 units   - 380: +11 units   - 400: +12 units  BG +400: +13 units - MAX     Patient has failed the following Diabetes medications:   Glucovance       Labs Reviewed.       Lab Results   Component Value Date    CPEPTIDE 0.76 (L) 10/08/2020     Lab Results   Component Value Date    GLUTAMICACID 0.00 10/08/2020          //   , There is no height or weight on file to calculate BMI.  Her blood sugar in clinic today is:    Lab Results   Component Value Date    POCGLU 256 (A) 2020       Review of Systems   Constitutional:  Negative for activity change, appetite change, chills and fever.   HENT:  Negative for dental problem, mouth sores, nosebleeds, sore throat and trouble swallowing.    Eyes:  Negative for pain and discharge.   Respiratory:  Negative for shortness of  breath, wheezing and stridor.    Cardiovascular:  Negative for chest pain, palpitations and leg swelling.   Gastrointestinal:  Negative for abdominal pain, diarrhea, nausea and vomiting.   Endocrine: Negative for polydipsia, polyphagia and polyuria.   Genitourinary:  Negative for dysuria, frequency and urgency.   Musculoskeletal:  Negative for joint swelling and myalgias.   Skin:  Negative for rash and wound.   Neurological:  Negative for dizziness, syncope, weakness and headaches.   Psychiatric/Behavioral:  Negative for behavioral problems and dysphoric mood.        Diabetes Management Status  Statin: Not taking  ACE/ARB: Taking    Screening or Prevention Patient's value Goal Complete/Controlled?   HgA1C Testing and Control   Lab Results   Component Value Date    HGBA1C 8.2 (H) 2022      Annually/Less than 8% No   Lipid profile : 2022 Annually Yes   LDL control Lab Results   Component Value Date    LDLCALC 198.2 (H) 2022    Annually/Less than 100 mg/dl  No   Nephropathy screening Lab Results   Component Value Date    MICALBCREAT 14.6 2022     Lab Results   Component Value Date    PROTEINUA Negative 2022    Annually Yes   Blood pressure BP Readings from Last 1 Encounters:   22 138/80    Less than 140/90 Yes   Dilated retinal exam : 2017 Annually No    Foot exam   : 2020 Annually No     Social History     Socioeconomic History    Marital status:     Number of children: 3   Occupational History     Employer: [employer]   Tobacco Use    Smoking status: Former     Packs/day: 1.00     Years: 15.00     Pack years: 15.00     Types: Cigarettes     Start date:      Quit date: 2007     Years since quittin.2    Smokeless tobacco: Never    Tobacco comments:     smoked off and on from 5117-6266 about 15 years total years smoking.   Substance and Sexual Activity    Alcohol use: Yes     Alcohol/week: 21.0 standard drinks     Types: 21 Glasses of wine  per week    Drug use: No    Sexual activity: Yes     Partners: Male   Social History Narrative    , 3 kids, [employer].     Social Determinants of Health     Financial Resource Strain: Low Risk     Difficulty of Paying Living Expenses: Not hard at all   Food Insecurity: No Food Insecurity    Worried About Running Out of Food in the Last Year: Never true    Ran Out of Food in the Last Year: Never true   Transportation Needs: No Transportation Needs    Lack of Transportation (Medical): No    Lack of Transportation (Non-Medical): No   Physical Activity: Insufficiently Active    Days of Exercise per Week: 1 day    Minutes of Exercise per Session: 20 min   Stress: Stress Concern Present    Feeling of Stress : Very much   Social Connections: Unknown    Frequency of Communication with Friends and Family: Twice a week    Frequency of Social Gatherings with Friends and Family: Never    Active Member of Clubs or Organizations: No    Attends Club or Organization Meetings: Never    Marital Status:    Housing Stability: Low Risk     Unable to Pay for Housing in the Last Year: No    Number of Places Lived in the Last Year: 1    Unstable Housing in the Last Year: No     Past Medical History:   Diagnosis Date    Abnormal Pap smear of vagina     Arthritis     Left shoulder    Diabetes mellitus type II     Dr Ayala manages DM     Hyperlipidemia     Hypertension     Thyroid disease     Dr Melissa fontainerioneaux- goitre+       Objective:        Physical Exam  Constitutional:       General: She is not in acute distress.     Appearance: She is well-developed. She is not diaphoretic.   HENT:      Head: Normocephalic and atraumatic.      Right Ear: External ear normal.      Left Ear: External ear normal.      Nose: Nose normal.   Eyes:      General:         Right eye: No discharge.         Left eye: No discharge.   Pulmonary:      Effort: Pulmonary effort is normal. No respiratory distress.      Breath sounds: No stridor.    Musculoskeletal:         General: Normal range of motion.      Cervical back: Normal range of motion.   Skin:     Coloration: Skin is not pale.   Neurological:      Mental Status: She is alert and oriented to person, place, and time.      Motor: No abnormal muscle tone.      Coordination: Coordination normal.   Psychiatric:         Behavior: Behavior normal.         Thought Content: Thought content normal.         Judgment: Judgment normal.         Assessment / Plan:     Type 1 diabetes mellitus with hyperglycemia, with long-term current use of insulin  -     Lipid Panel; Future; Expected date: 04/05/2023  -     Glutamic Acid Decarboxylase; Future; Expected date: 04/05/2023  -     C-Peptide; Future; Expected date: 04/05/2023  -     Hemoglobin A1C; Future; Expected date: 04/05/2023  -     ANTI-ISLET CELL ANTIBODY; Future; Expected date: 04/05/2023  -     insulin aspart U-100 (NOVOLOG FLEXPEN U-100 INSULIN) 100 unit/mL (3 mL) InPn pen; Inject 12 Units into the skin 3 (three) times daily with meals. As per SSI  Dispense: 15 each; Refill: 3    Hypoglycemia due to type 1 diabetes mellitus    Hypertension associated with type 1 diabetes mellitus    Hyperlipidemia due to type 1 diabetes mellitus    History of thyroid surgery    Multiple thyroid nodules    Statin intolerance    GENTRY (obstructive sleep apnea)    Type 1 diabetes mellitus with hyperglycemia      Additional Plan Details:    - POCT Glucose  - Encouraged continuation of lifestyle changes including regular exercise and limiting carbohydrates to 30-45 grams per meal threes times daily and 15 grams per snack with a limit of two daily.   - Encouraged continued monitoring of blood glucose with maintenance of 4 times daily and Continuously with personal CGM Dexcom. G7 to DMS.   - Current DM Medication Regimen: Continue Basaglar 30 units.Restart Novolog meal sized dosing and correction dosing per scale below.   - Fasting labs scheduled Mon  - Health Maintenance  standards addressed today: Foot Exam - deferred by patient today because Telemedicine or Telephone visit, Eye Exam - completed outside of [facility]; will sign ROR today for records, A1c to be scheduled, Flu shot - patient declined, and COVID - 19 Vaccine - Declined by patient  - Nursing Visit: Patient is age 79 or younger with an A1c of 7.5 or greater and will not need nursing visit at this time .   - Follow up in 4-6 weeks with A1c prior.     Meal Size:               - Not Eatin units of Novolog ( + correction )  - Carbs with Meal: 5 units of Novolog ( + correction )                 To Correct Blood Sugar (TO add to meal time dose OR to be used as correction after 3 hours if not eating:   BG 61 - 80: -1 units  BG 81 - 120: +0 units   - 140: +1 units   - 180: +2 units   - 200: +3 units    - 220: +4 units   - 240: +5 units   - 260: +6 units   - 280: +7 units   - 300: +8 units   - 320: +9 units   - 340: +10 units   - 380: +11 units   - 400: +12 units  BG +400: +13 units - MAX Blakeney McKnight, PA-C [facility]

## 2023-04-05 NOTE — TELEPHONE ENCOUNTER
Called pt to see what other pharmacy she would like for her novolog to be sent to. Pt did not answer lvm for pt to call back

## 2023-04-05 NOTE — Clinical Note
"4-6 week virtual "Clarity" G7 order to DMS  Fasting labs Monday at [facility] at 8a Novolog Rx ? Does it need formulary alternatice  Eye doctor records (she was going to get name of eye doct) "

## 2023-04-05 NOTE — PATIENT INSTRUCTIONS
CURRENT DM MEDICATIONS:   Basaglar 30 units at night   Novolog as below - before every meal (3x/day) and every 3 hours as needed              Meal Size:               - Not Eatin units of Novolog ( + correction )  - Carbs with Meal: 5 units of Novolog ( + correction )                 To Correct Blood Sugar (TO add to meal time dose OR to be used as correction after 3 hours if not eating:   BG 61 - 80: -1 units  BG 81 - 120: +0 units   - 140: +1 units   - 180: +2 units   - 200: +3 units    - 220: +4 units   - 240: +5 units   - 260: +6 units   - 280: +7 units   - 300: +8 units   - 320: +9 units   - 340: +10 units   - 380: +11 units   - 400: +12 units  BG +400: +13 units - MAX    ATTACHING SOME HOLISTIC TIPS FOR ANXIETY AND DEPRESSION MANAGEMENT TO AVS

## 2023-04-10 ENCOUNTER — LAB VISIT (OUTPATIENT)
Dept: LAB | Facility: HOSPITAL | Age: 63
End: 2023-04-10
Payer: COMMERCIAL

## 2023-04-10 DIAGNOSIS — E10.65 TYPE 1 DIABETES MELLITUS WITH HYPERGLYCEMIA, WITH LONG-TERM CURRENT USE OF INSULIN: ICD-10-CM

## 2023-04-10 LAB
C PEPTIDE SERPL-MCNC: 1.39 NG/ML (ref 0.78–5.19)
CHOLEST SERPL-MCNC: 265 MG/DL (ref 120–199)
CHOLEST/HDLC SERPL: 3.6 {RATIO} (ref 2–5)
ESTIMATED AVG GLUCOSE: 180 MG/DL (ref 68–131)
HBA1C MFR BLD: 7.9 % (ref 4–5.6)
HDLC SERPL-MCNC: 74 MG/DL (ref 40–75)
HDLC SERPL: 27.9 % (ref 20–50)
LDLC SERPL CALC-MCNC: 177 MG/DL (ref 63–159)
NONHDLC SERPL-MCNC: 191 MG/DL
TRIGL SERPL-MCNC: 70 MG/DL (ref 30–150)

## 2023-04-10 PROCEDURE — 83036 HEMOGLOBIN GLYCOSYLATED A1C: CPT | Performed by: PHYSICIAN ASSISTANT

## 2023-04-10 PROCEDURE — 80061 LIPID PANEL: CPT | Performed by: PHYSICIAN ASSISTANT

## 2023-04-10 PROCEDURE — 86341 ISLET CELL ANTIBODY: CPT | Performed by: PHYSICIAN ASSISTANT

## 2023-04-10 PROCEDURE — 86341 ISLET CELL ANTIBODY: CPT | Mod: 91 | Performed by: PHYSICIAN ASSISTANT

## 2023-04-10 PROCEDURE — 36415 COLL VENOUS BLD VENIPUNCTURE: CPT | Performed by: PHYSICIAN ASSISTANT

## 2023-04-10 PROCEDURE — 84681 ASSAY OF C-PEPTIDE: CPT | Performed by: PHYSICIAN ASSISTANT

## 2023-04-12 LAB — GAD65 AB SER-SCNC: 0 NMOL/L

## 2023-04-15 LAB — PANC ISLET CELL IGG SER-ACNC: NORMAL

## 2023-04-19 ENCOUNTER — PATIENT MESSAGE (OUTPATIENT)
Dept: ADMINISTRATIVE | Facility: HOSPITAL | Age: 63
End: 2023-04-19
Payer: COMMERCIAL

## 2023-05-29 ENCOUNTER — PATIENT OUTREACH (OUTPATIENT)
Dept: ADMINISTRATIVE | Facility: HOSPITAL | Age: 63
End: 2023-05-29
Payer: COMMERCIAL

## 2023-05-29 NOTE — LETTER
[facility]      AUTHORIZATION FOR RELEASE OF   CONFIDENTIAL INFORMATION      We are seeing Leesa Le, date of birth 1960, in the clinic at [facility]. Rosangela Cat MD is the patient's PCP. Leesa Le has an outstanding lab/procedure at the time we reviewed her chart. In order to help keep her health information updated, she has authorized us to request the following medical record(s):        (  )  MAMMOGRAM                                      (  )  COLONOSCOPY      (  )  PAP SMEAR                                          (  )  OUTSIDE LAB RESULTS     (  )  DEXA SCAN                                          (  x)  EYE EXAM            (  )  FOOT EXAM                                          (  )  ENTIRE RECORD     (  )  OUTSIDE IMMUNIZATIONS                 (  )  _______________         Please fax records to [facility], Shilpa Reddy, MD, [phone]     If you have any questions, please contact Mary HARVEY at [phone].           Patient Name: Leesa Le  : 1960  Patient Phone #: [phone]

## 2023-06-27 ENCOUNTER — PATIENT MESSAGE (OUTPATIENT)
Dept: DIABETES | Facility: CLINIC | Age: 63
End: 2023-06-27
Payer: COMMERCIAL

## 2023-06-28 ENCOUNTER — PATIENT MESSAGE (OUTPATIENT)
Dept: DIABETES | Facility: CLINIC | Age: 63
End: 2023-06-28
Payer: COMMERCIAL

## 2023-09-28 ENCOUNTER — TELEPHONE (OUTPATIENT)
Dept: DIABETES | Facility: CLINIC | Age: 63
End: 2023-09-28
Payer: COMMERCIAL

## 2023-09-28 NOTE — TELEPHONE ENCOUNTER
Called patient to discuss if she would be interested in switching providers for better availability after her appointment on 10/16/23 Left a message.

## 2023-10-09 ENCOUNTER — TELEPHONE (OUTPATIENT)
Dept: DIABETES | Facility: CLINIC | Age: 63
End: 2023-10-09
Payer: COMMERCIAL

## 2023-10-09 NOTE — TELEPHONE ENCOUNTER
Called pt. Pt states that she has not been using Dexcom. Pt did not provide a reason why she has not been using it.

## 2023-10-16 ENCOUNTER — OFFICE VISIT (OUTPATIENT)
Dept: DIABETES | Facility: CLINIC | Age: 63
End: 2023-10-16
Payer: COMMERCIAL

## 2023-10-16 ENCOUNTER — LAB VISIT (OUTPATIENT)
Dept: LAB | Facility: HOSPITAL | Age: 63
End: 2023-10-16
Attending: PHYSICIAN ASSISTANT
Payer: COMMERCIAL

## 2023-10-16 VITALS
DIASTOLIC BLOOD PRESSURE: 89 MMHG | SYSTOLIC BLOOD PRESSURE: 139 MMHG | HEIGHT: 60 IN | BODY MASS INDEX: 22.55 KG/M2 | HEART RATE: 75 BPM | WEIGHT: 114.88 LBS

## 2023-10-16 DIAGNOSIS — I15.2 HYPERTENSION ASSOCIATED WITH TYPE 1 DIABETES MELLITUS: ICD-10-CM

## 2023-10-16 DIAGNOSIS — G72.0 STATIN MYOPATHY: ICD-10-CM

## 2023-10-16 DIAGNOSIS — E10.59 HYPERTENSION ASSOCIATED WITH TYPE 1 DIABETES MELLITUS: ICD-10-CM

## 2023-10-16 DIAGNOSIS — E10.69 HYPERLIPIDEMIA DUE TO TYPE 1 DIABETES MELLITUS: ICD-10-CM

## 2023-10-16 DIAGNOSIS — G47.33 OSA (OBSTRUCTIVE SLEEP APNEA): ICD-10-CM

## 2023-10-16 DIAGNOSIS — Z13.31 POSITIVE DEPRESSION SCREENING: ICD-10-CM

## 2023-10-16 DIAGNOSIS — E78.5 HYPERLIPIDEMIA DUE TO TYPE 1 DIABETES MELLITUS: ICD-10-CM

## 2023-10-16 DIAGNOSIS — E10.65 TYPE 1 DIABETES MELLITUS WITH HYPERGLYCEMIA, WITH LONG-TERM CURRENT USE OF INSULIN: ICD-10-CM

## 2023-10-16 DIAGNOSIS — Z98.890 HISTORY OF THYROID SURGERY: ICD-10-CM

## 2023-10-16 DIAGNOSIS — T46.6X5A STATIN MYOPATHY: ICD-10-CM

## 2023-10-16 DIAGNOSIS — E10.65 TYPE 1 DIABETES MELLITUS WITH HYPERGLYCEMIA, WITH LONG-TERM CURRENT USE OF INSULIN: Primary | ICD-10-CM

## 2023-10-16 DIAGNOSIS — E04.2 MULTIPLE THYROID NODULES: ICD-10-CM

## 2023-10-16 DIAGNOSIS — F33.9 MAJOR DEPRESSION, RECURRENT, CHRONIC: ICD-10-CM

## 2023-10-16 LAB — GLUCOSE SERPL-MCNC: 234 MG/DL (ref 70–110)

## 2023-10-16 PROCEDURE — 83036 HEMOGLOBIN GLYCOSYLATED A1C: CPT | Performed by: PHYSICIAN ASSISTANT

## 2023-10-16 PROCEDURE — 84443 ASSAY THYROID STIM HORMONE: CPT | Performed by: PHYSICIAN ASSISTANT

## 2023-10-16 PROCEDURE — 3008F BODY MASS INDEX DOCD: CPT | Mod: CPTII,S$GLB,, | Performed by: PHYSICIAN ASSISTANT

## 2023-10-16 PROCEDURE — 3008F PR BODY MASS INDEX (BMI) DOCUMENTED: ICD-10-PCS | Mod: CPTII,S$GLB,, | Performed by: PHYSICIAN ASSISTANT

## 2023-10-16 PROCEDURE — 84439 ASSAY OF FREE THYROXINE: CPT | Performed by: PHYSICIAN ASSISTANT

## 2023-10-16 PROCEDURE — 99214 PR OFFICE/OUTPT VISIT, EST, LEVL IV, 30-39 MIN: ICD-10-PCS | Mod: S$GLB,,, | Performed by: PHYSICIAN ASSISTANT

## 2023-10-16 PROCEDURE — 3051F HG A1C>EQUAL 7.0%<8.0%: CPT | Mod: CPTII,S$GLB,, | Performed by: PHYSICIAN ASSISTANT

## 2023-10-16 PROCEDURE — 85025 COMPLETE CBC W/AUTO DIFF WBC: CPT | Performed by: PHYSICIAN ASSISTANT

## 2023-10-16 PROCEDURE — 3079F DIAST BP 80-89 MM HG: CPT | Mod: CPTII,S$GLB,, | Performed by: PHYSICIAN ASSISTANT

## 2023-10-16 PROCEDURE — 3079F PR MOST RECENT DIASTOLIC BLOOD PRESSURE 80-89 MM HG: ICD-10-PCS | Mod: CPTII,S$GLB,, | Performed by: PHYSICIAN ASSISTANT

## 2023-10-16 PROCEDURE — 36415 COLL VENOUS BLD VENIPUNCTURE: CPT | Performed by: PHYSICIAN ASSISTANT

## 2023-10-16 PROCEDURE — 3075F SYST BP GE 130 - 139MM HG: CPT | Mod: CPTII,S$GLB,, | Performed by: PHYSICIAN ASSISTANT

## 2023-10-16 PROCEDURE — 82962 GLUCOSE BLOOD TEST: CPT | Mod: S$GLB,,, | Performed by: PHYSICIAN ASSISTANT

## 2023-10-16 PROCEDURE — 1159F MED LIST DOCD IN RCRD: CPT | Mod: CPTII,S$GLB,, | Performed by: PHYSICIAN ASSISTANT

## 2023-10-16 PROCEDURE — 82962 POCT GLUCOSE, HAND-HELD DEVICE: ICD-10-PCS | Mod: S$GLB,,, | Performed by: PHYSICIAN ASSISTANT

## 2023-10-16 PROCEDURE — 99214 OFFICE O/P EST MOD 30 MIN: CPT | Mod: S$GLB,,, | Performed by: PHYSICIAN ASSISTANT

## 2023-10-16 PROCEDURE — 3075F PR MOST RECENT SYSTOLIC BLOOD PRESS GE 130-139MM HG: ICD-10-PCS | Mod: CPTII,S$GLB,, | Performed by: PHYSICIAN ASSISTANT

## 2023-10-16 PROCEDURE — 3051F PR MOST RECENT HEMOGLOBIN A1C LEVEL 7.0 - < 8.0%: ICD-10-PCS | Mod: CPTII,S$GLB,, | Performed by: PHYSICIAN ASSISTANT

## 2023-10-16 PROCEDURE — 99999 PR PBB SHADOW E&M-EST. PATIENT-LVL V: CPT | Mod: PBBFAC,,, | Performed by: PHYSICIAN ASSISTANT

## 2023-10-16 PROCEDURE — 1159F PR MEDICATION LIST DOCUMENTED IN MEDICAL RECORD: ICD-10-PCS | Mod: CPTII,S$GLB,, | Performed by: PHYSICIAN ASSISTANT

## 2023-10-16 PROCEDURE — 80053 COMPREHEN METABOLIC PANEL: CPT | Performed by: PHYSICIAN ASSISTANT

## 2023-10-16 PROCEDURE — 99999 PR PBB SHADOW E&M-EST. PATIENT-LVL V: ICD-10-PCS | Mod: PBBFAC,,, | Performed by: PHYSICIAN ASSISTANT

## 2023-10-16 PROCEDURE — 82043 UR ALBUMIN QUANTITATIVE: CPT | Performed by: PHYSICIAN ASSISTANT

## 2023-10-16 RX ORDER — PEN NEEDLE, DIABETIC 30 GX3/16"
1 NEEDLE, DISPOSABLE MISCELLANEOUS
Qty: 100 EACH | Refills: 11 | Status: SHIPPED | OUTPATIENT
Start: 2023-10-16 | End: 2024-10-15

## 2023-10-16 RX ORDER — INSULIN ASPART 100 [IU]/ML
12 INJECTION, SOLUTION INTRAVENOUS; SUBCUTANEOUS
Qty: 15 EACH | Refills: 3 | Status: SHIPPED | OUTPATIENT
Start: 2023-10-16

## 2023-10-16 NOTE — PROGRESS NOTES
PCP: Rosangela Cat MD    Subjective:     Chief Complaint: Diabetes - Established Patient    HISTORY OF PRESENT ILLNESS: 63 y.o.   female presenting for diabetes management visit.   The patient's last visit with me was on 4/5/2023.   Patient has had Type I diabetes due to low C peptide since 10 years ago.  Pertinent to decision making is the following comorbidities: HTN, HLD and Multinodular goiter s/p thyroid surgery, GENTRY on CPAP, Statin Intolerance, and Medical Noncompliance  Patient has the following Diabetes complications: without complications  She  has attended diabetes education in the past.      Patient's most recent A1c of 7.9% was completed 6 months ago.   Patient states since Her last A1c Her blood glucose levels have been  improved  throughout the day .   Patient monitors blood glucose 4 times per day and Continuously with personal CGM Dexcom. Has Dexcom G7 - needs to train.   Patient blood glucose monitoring device will not be uploaded into Media Section today.  Patient blood sugar in clinic is 234 which is fasting.  Patient endorses the following diabetes related symptoms:  Depression and Anxiety . Previously seeing therapy and psychiatry. Has history of medication side effects with depression meds including agitation and SI.   Patient is due today for the following diabetes-related health maintenance standards: Foot Exam , Eye Exam, A1c, Influenza Vaccine, COVID-19 Vaccine , and Mammogram .   She denies recent hospital admissions or emergency room visits. Patient denies known DKA admissions.  She denies having hypoglycemia.  Patient's concerns today include glycemic control. Of note, patient reports avoiding carb intake - mostly eating protein and nonstarchy vegetables.   Patient medication regimen is as below.     CURRENT DM MEDICATIONS:   Basaglar 30 units at night - not taking  Novolog as below - before every meal (3x/day) and every 3 hours as needed - not taking            Meal  Size:               - Not Eatin units of Novolog ( + correction )  - Carbs with Meal: 5 units of Novolog ( + correction )                 To Correct Blood Sugar (TO add to meal time dose OR to be used as correction after 3 hours if not eating:   BG 61 - 80: -1 units  BG 81 - 120: +0 units   - 140: +1 units   - 180: +2 units   - 200: +3 units    - 220: +4 units   - 240: +5 units   - 260: +6 units   - 280: +7 units   - 300: +8 units   - 320: +9 units   - 340: +10 units   - 380: +11 units   - 400: +12 units  BG +400: +13 units - MAX     Patient has failed the following Diabetes medications:   Glucovance       Labs Reviewed.       Lab Results   Component Value Date    CPEPTIDE 1.39 04/10/2023     Lab Results   Component Value Date    GLUTAMICACID 0.00 04/10/2023       Height: 5' (152.4 cm)  //  Weight: 52.1 kg (114 lb 13.8 oz), Body mass index is 22.43 kg/m².  Her blood sugar in clinic today is:    Lab Results   Component Value Date    POCGLU 234 (A) 10/16/2023       Review of Systems   Constitutional:  Negative for activity change, appetite change, chills and fever.   HENT:  Negative for dental problem, mouth sores, nosebleeds, sore throat and trouble swallowing.    Eyes:  Negative for pain and discharge.   Respiratory:  Negative for shortness of breath, wheezing and stridor.    Cardiovascular:  Negative for chest pain, palpitations and leg swelling.   Gastrointestinal:  Negative for abdominal pain, diarrhea, nausea and vomiting.   Endocrine: Negative for polydipsia, polyphagia and polyuria.   Genitourinary:  Negative for dysuria, frequency and urgency.   Musculoskeletal:  Negative for joint swelling and myalgias.   Skin:  Negative for rash and wound.   Neurological:  Negative for dizziness, syncope, weakness and headaches.   Psychiatric/Behavioral:  Negative for behavioral problems and dysphoric mood.          Diabetes Management  Status  Statin: Not taking  ACE/ARB: Taking    Screening or Prevention Patient's value Goal Complete/Controlled?   HgA1C Testing and Control   Lab Results   Component Value Date    HGBA1C 7.9 (H) 04/10/2023      Annually/Less than 8% No   Lipid profile : 04/10/2023 Annually Yes   LDL control Lab Results   Component Value Date    LDLCALC 177.0 (H) 04/10/2023    Annually/Less than 100 mg/dl  No   Nephropathy screening Lab Results   Component Value Date    MICALBCREAT 14.6 2022     Lab Results   Component Value Date    PROTEINUA Negative 2022    Annually Yes   Blood pressure BP Readings from Last 1 Encounters:   10/16/23 139/89    Less than 140/90 Yes   Dilated retinal exam : 2017 Annually No    Foot exam   : 2020 Annually No     Social History     Socioeconomic History    Marital status:     Number of children: 3   Occupational History     Employer: [employer]   Tobacco Use    Smoking status: Former     Current packs/day: 0.00     Average packs/day: 1 pack/day for 28.0 years (28.0 ttl pk-yrs)     Types: Cigarettes     Start date:      Quit date: 2007     Years since quittin.7    Smokeless tobacco: Never    Tobacco comments:     smoked off and on from [id number] about 15 years total years smoking.   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     Social Determinants of Health     Financial Resource Strain: Low Risk  (2022)    Overall Financial Resource Strain (CARDIA)     Difficulty of Paying Living Expenses: Not hard at all   Food Insecurity: No Food Insecurity (2022)    Hunger Vital Sign     Worried About Running Out of Food in the Last Year: Never true     Ran Out of Food in the Last Year: Never true   Transportation Needs: No Transportation Needs  (9/23/2022)    PRAPARE - Transportation     Lack of Transportation (Medical): No     Lack of Transportation (Non-Medical): No   Physical Activity: Insufficiently Active (9/23/2022)    Exercise Vital Sign     Days of Exercise per Week: 1 day     Minutes of Exercise per Session: 20 min   Stress: Stress Concern Present (9/23/2022)    [place] [employer] - Occupational Stress Questionnaire     Feeling of Stress : Very much   Social Connections: Unknown (9/23/2022)    Social Connection and Isolation Panel [NHANES]     Frequency of Communication with Friends and Family: Twice a week     Frequency of Social Gatherings with Friends and Family: Never     Active Member of Clubs or Organizations: No     Attends Club or Organization Meetings: Never     Marital Status:    Housing Stability: Low Risk  (9/23/2022)    Housing Stability Vital Sign     Unable to Pay for Housing in the Last Year: No     Number of Places Lived in the Last Year: 1     Unstable Housing in the Last Year: No     Past Medical History:   Diagnosis Date    Abnormal Pap smear of vagina     Arthritis     Left shoulder    Diabetes mellitus type II     Dr Ayala manages DM     Hyperlipidemia     Hypertension     Thyroid disease     Dr Melissa fontaineriomarekux- goitre+       Objective:        Physical Exam  Constitutional:       General: She is not in acute distress.     Appearance: She is well-developed. She is not diaphoretic.   HENT:      Head: Normocephalic and atraumatic.      Right Ear: External ear normal.      Left Ear: External ear normal.      Nose: Nose normal.   Eyes:      General:         Right eye: No discharge.         Left eye: No discharge.      Pupils: Pupils are equal, round, and reactive to light.   Cardiovascular:      Rate and Rhythm: Normal rate and regular rhythm.      Heart sounds: Normal heart sounds.   Pulmonary:      Effort: Pulmonary effort is normal. No respiratory distress.      Breath sounds: Normal breath sounds.  "No stridor.   Abdominal:      Palpations: Abdomen is soft.   Musculoskeletal:         General: Normal range of motion.      Cervical back: Normal range of motion and neck supple.   Skin:     General: Skin is warm and dry.      Capillary Refill: Capillary refill takes less than 2 seconds.      Coloration: Skin is not pale.   Neurological:      Mental Status: She is alert and oriented to person, place, and time.      Motor: No abnormal muscle tone.      Coordination: Coordination normal.   Psychiatric:         Behavior: Behavior normal.         Thought Content: Thought content normal.         Judgment: Judgment normal.           Assessment / Plan:     Type 1 diabetes mellitus with hyperglycemia, with long-term current use of insulin  -     POCT Glucose, Hand-Held Device  -     Ambulatory referral/consult to Diabetes Education; Future; Expected date: 10/23/2023  -     insulin aspart U-100 (NOVOLOG FLEXPEN U-100 INSULIN) 100 unit/mL (3 mL) InPn pen; Inject 12 Units into the skin 3 (three) times daily with meals. As per SSI  Dispense: 15 each; Refill: 3  -     pen needle, diabetic (BD ULTRA-FINE TRINI PEN NEEDLE) 32 gauge x 5/32" Ndle; 1 each by Misc.(Non-Drug; Combo Route) route 4 (four) times daily with meals and nightly.  Dispense: 100 each; Refill: 11  -     TSH; Future; Expected date: 10/16/2023  -     Hemoglobin A1C; Standing  -     Microalbumin/Creatinine Ratio, Urine; Future; Expected date: 10/16/2023  -     T4, Free; Future; Expected date: 10/16/2023  -     Comprehensive Metabolic Panel; Future; Expected date: 10/16/2023  -     CBC Auto Differential; Future; Expected date: 10/16/2023    Major depression, recurrent, chronic  -     Ambulatory referral/consult to Psychiatry; Future; Expected date: 10/23/2023    Positive depression screening  Comments:  I have reviewed the positive depression score which warrants active treatment with psychotherapy and/or medications.      Additional Plan Details:    - POCT " Glucose  - Encouraged continuation of lifestyle changes including regular exercise and limiting carbohydrates to 30-45 grams per meal threes times daily and 15 grams per snack with a limit of two daily.   - Encouraged continued monitoring of blood glucose with maintenance of 4 times daily and Continuously with personal CGM Dexcom. Referral to CDE - Dex G7 training.    - Current DM Medication Regimen: Hold Basaglar due to medication burden / depression. Restart Novolog correction dosing per scale below.   - Labs sched   - Referral to Psychiatry: recurrent depression  - Health Maintenance standards addressed today: Foot Exam - deferred by patient today because time constraints, Eye Exam - will be completed outside of [facility] and patient will schedule, A1c to be scheduled, Flu shot - patient declined, and COVID - 19 Vaccine - Declined by patient  - Nursing Visit: Patient is age 79 or younger with an A1c of 7.5 or greater and will not need nursing visit at this time .   - Follow up in 1 weeks with A1c prior.     CURRENT DM MEDICATIONS:   Basaglar - hold  Novolog as below - every 3 hours as needed                 To Correct Blood Sugar (TO add to meal time dose OR to be used as correction after 3 hours if not eating:   BG 61 - 80: -1 units  BG 81 - 120: +0 units   - 140: +1 units   - 180: +2 units   - 200: +3 units    - 220: +4 units   - 240: +5 units   - 260: +6 units   - 280: +7 units   - 300: +8 units   - 320: +9 units   - 340: +10 units   - 380: +11 units   - 400: +12 units  BG +400: +13 units - MAX Blakeney McKnight, PA-C [facility]      A total of 40 minutes was spent in face to face time, of which over 50 % was spent in counseling patient on disease process, complications, treatment, and side effects of medications.

## 2023-10-16 NOTE — PATIENT INSTRUCTIONS
CURRENT DM MEDICATIONS:   Basaglar - hold  Novolog as below - every 3 hours as needed                 To Correct Blood Sugar (TO add to meal time dose OR to be used as correction after 3 hours if not eating:   BG 61 - 80: -1 units  BG 81 - 120: +0 units   - 140: +1 units   - 180: +2 units   - 200: +3 units    - 220: +4 units   - 240: +5 units   - 260: +6 units   - 280: +7 units   - 300: +8 units   - 320: +9 units   - 340: +10 units   - 380: +11 units   - 400: +12 units  BG +400: +13 units - MAX  
yes

## 2023-10-17 LAB
ALBUMIN SERPL BCP-MCNC: 4.3 G/DL (ref 3.5–5.2)
ALBUMIN/CREAT UR: 47.8 UG/MG (ref 0–30)
ALP SERPL-CCNC: 84 U/L (ref 55–135)
ALT SERPL W/O P-5'-P-CCNC: 35 U/L (ref 10–44)
ANION GAP SERPL CALC-SCNC: 15 MMOL/L (ref 8–16)
AST SERPL-CCNC: 26 U/L (ref 10–40)
BASOPHILS # BLD AUTO: 0.05 K/UL (ref 0–0.2)
BASOPHILS NFR BLD: 1 % (ref 0–1.9)
BILIRUB SERPL-MCNC: 0.7 MG/DL (ref 0.1–1)
BUN SERPL-MCNC: 16 MG/DL (ref 8–23)
CALCIUM SERPL-MCNC: 10.3 MG/DL (ref 8.7–10.5)
CHLORIDE SERPL-SCNC: 103 MMOL/L (ref 95–110)
CO2 SERPL-SCNC: 21 MMOL/L (ref 23–29)
CREAT SERPL-MCNC: 0.8 MG/DL (ref 0.5–1.4)
CREAT UR-MCNC: 23 MG/DL (ref 15–325)
DIFFERENTIAL METHOD: ABNORMAL
EOSINOPHIL # BLD AUTO: 0.1 K/UL (ref 0–0.5)
EOSINOPHIL NFR BLD: 2.7 % (ref 0–8)
ERYTHROCYTE [DISTWIDTH] IN BLOOD BY AUTOMATED COUNT: 15.2 % (ref 11.5–14.5)
EST. GFR  (NO RACE VARIABLE): >60 ML/MIN/1.73 M^2
ESTIMATED AVG GLUCOSE: 240 MG/DL (ref 68–131)
GLUCOSE SERPL-MCNC: 196 MG/DL (ref 70–110)
HBA1C MFR BLD: 10 % (ref 4–5.6)
HCT VFR BLD AUTO: 39.5 % (ref 37–48.5)
HGB BLD-MCNC: 13.4 G/DL (ref 12–16)
IMM GRANULOCYTES # BLD AUTO: 0 K/UL (ref 0–0.04)
IMM GRANULOCYTES NFR BLD AUTO: 0 % (ref 0–0.5)
LYMPHOCYTES # BLD AUTO: 2 K/UL (ref 1–4.8)
LYMPHOCYTES NFR BLD: 42.1 % (ref 18–48)
MCH RBC QN AUTO: 25.7 PG (ref 27–31)
MCHC RBC AUTO-ENTMCNC: 33.9 G/DL (ref 32–36)
MCV RBC AUTO: 76 FL (ref 82–98)
MICROALBUMIN UR DL<=1MG/L-MCNC: 11 UG/ML
MONOCYTES # BLD AUTO: 0.3 K/UL (ref 0.3–1)
MONOCYTES NFR BLD: 6.6 % (ref 4–15)
NEUTROPHILS # BLD AUTO: 2.3 K/UL (ref 1.8–7.7)
NEUTROPHILS NFR BLD: 47.6 % (ref 38–73)
NRBC BLD-RTO: 0 /100 WBC
PLATELET # BLD AUTO: 294 K/UL (ref 150–450)
PMV BLD AUTO: 11.8 FL (ref 9.2–12.9)
POTASSIUM SERPL-SCNC: 4.8 MMOL/L (ref 3.5–5.1)
PROT SERPL-MCNC: 8.5 G/DL (ref 6–8.4)
RBC # BLD AUTO: 5.22 M/UL (ref 4–5.4)
SODIUM SERPL-SCNC: 139 MMOL/L (ref 136–145)
T4 FREE SERPL-MCNC: 1.05 NG/DL (ref 0.71–1.51)
TSH SERPL DL<=0.005 MIU/L-ACNC: 1.06 UIU/ML (ref 0.4–4)
WBC # BLD AUTO: 4.82 K/UL (ref 3.9–12.7)

## 2023-10-19 ENCOUNTER — CLINICAL SUPPORT (OUTPATIENT)
Dept: DIABETES | Facility: CLINIC | Age: 63
End: 2023-10-19
Payer: COMMERCIAL

## 2023-10-19 DIAGNOSIS — E10.65 TYPE 1 DIABETES MELLITUS WITH HYPERGLYCEMIA, WITH LONG-TERM CURRENT USE OF INSULIN: Primary | ICD-10-CM

## 2023-10-19 PROCEDURE — 99999 PR PBB SHADOW E&M-EST. PATIENT-LVL II: ICD-10-PCS | Mod: PBBFAC,,, | Performed by: DIETITIAN, REGISTERED

## 2023-10-19 PROCEDURE — G0108 PR DIAB MANAGE TRN  PER INDIV: ICD-10-PCS | Mod: S$GLB,,, | Performed by: DIETITIAN, REGISTERED

## 2023-10-19 PROCEDURE — 99999 PR PBB SHADOW E&M-EST. PATIENT-LVL II: CPT | Mod: PBBFAC,,, | Performed by: DIETITIAN, REGISTERED

## 2023-10-19 PROCEDURE — G0108 DIAB MANAGE TRN  PER INDIV: HCPCS | Mod: S$GLB,,, | Performed by: DIETITIAN, REGISTERED

## 2023-10-19 NOTE — PROGRESS NOTES
Diabetes Care Specialist Progress Note  Author: Crystal Hartman RD, CDE  Date: 10/19/2023    Program Intake  Reason for Diabetes Program Visit:: Intervention  Type of Intervention:: Individual  Individual: Device Training  Device Training: Personal CGM (Dexcon G7)  Current diabetes risk level:: low  In the last 12 months, have you:: none  Permission to speak with others about care:: no    Lab Results   Component Value Date    HGBA1C 10.0 (H) 10/16/2023       Clinical    Problem Review  Reviewed Problem List with Patient: yes  Active comorbidities affecting diabetes self-care.: yes  Comorbidities: Hypertension    Clinical Assessment  Current Diabetes Treatment: Diet, prescribed insulin but not taking.     Labs  Do you have regular lab work to monitor your medications?: Yes  Type of Regular Lab Work: A1c  [facility]  Lab Compliance Barriers: No      Additional Social History      Access to Mass Media & Technology  Does the patient have access to any of the following devices or technologies?: Smart phone  Media or technology needs impacting ability to self-manage diabetes?: No    Cognitive/Behavioral Health  Alert and Oriented: Yes  Difficulty Thinking: No  Requires Prompting: No  Requires assistance for routine expression?: No  Cognitive or behavioral barriers impacting ability to self-manage diabetes?: No    Culture/Catholic  Culture or Quaker beliefs that may impact ability to access healthcare: No    Communication  Language preference: English  Hearing Problems: No  Vision Problems: Yes  Vision problem type:: Decreased Vision  Vision Assistance: Glasses  Communication needs impacting ability to self-manage diabetes?: No    Health Literacy  Preferred Learning Method: Face to Face, Demonstration, Reading Materials  How often do you need to have someone help you read instructions, pamphlets, or written material from your doctor or pharmacy?: Never  Health literacy needs impacting  ability to self-manage diabetes?: No      Diabetes Self-Management Skills Assessment    Medications  Patient is able to describe current diabetes management routine.: yes  Diabetes management routine:: diet, insulin  Patient is able to identify current diabetes medications, dosages, and appropriate timing of medications.: yes  Patient understands the purpose of the medications taken for diabetes.: yes  Patient reports problems or concerns with current medication regimen.: yes  Medication regimen problems/concerns:: other (see comments) (does not have insulin at home, needs to  at pharmacy.)  Medication Skills Assessment Completed:: Yes  Assessment indicates:: Instruction Needed  Area of need?: Yes    Home Blood Glucose Monitoring  Patient states that blood sugar is checked at home daily.: yes  Monitoring Method:: personal continuous glucose monitor  Personal CGM type:: upgrading to Dexcom G7 today  Patient is able to use personal CGM appropriately.: no (training today)  Home Blood Glucose Monitoring Skills Assessment Completed: : Yes  Assessment indicates:: Instruction Needed  Area of need?: Yes         Assessment Summary and Plan    Based on today's diabetes care assessment, the following areas of need were identified:          10/19/2023    12:01 AM   Social   Access to Mass Media/Tech No   Cognitive/Behavioral Health No   Culture/Lutheran No   Communication No   Health Literacy No            10/19/2023    12:01 AM   Clinical   Lab Compliance No            10/19/2023    12:01 AM   Diabetes Self-Management Skills   Medication Yes- see care plan.   Patient does not have any insulin at home. Refill called into pharmacy by Bulmaro Aguilera on 10/16/23. Patient will call pharmacy today for status.    Home Blood Glucose Monitoring Yes- see care plan.           Today's interventions were provided through individual discussion, instruction, and written materials were provided.      Patient verbalized understanding  "of instruction and written materials.  Pt was able to return back demonstration of instructions today. Patient understood key points, needs reinforcement and further instruction.     Diabetes Self-Management Care Plan:    Today's Diabetes Self-Management Care Plan was developed with Leesa's input. Leesa has agreed to work toward the following goal(s) to improve his/her overall diabetes control.      Care Plan: Diabetes Management   Updates made since 9/19/2023 12:00 AM            Problem: Medications         Goal: Patient will  Novolog at [facility] and start using per Sliding Scale as directed.    Start Date: 10/19/2023   Expected End Date: 1/19/2024   Priority: High   Barriers: Lack of Supplies        Task: Reviewed with patient all current diabetes medications and provided basic review of the purpose, dosage, frequency, side effects, and storage of both oral and injectable diabetes medications. Completed 10/19/2023        Task: Reviewed importance of taking medications as prescribed. Completed 10/19/2023        Problem: Blood Glucose Self-Monitoring         Goal: Patient will Dexcom G7 to monitor BG and make treatment decisions.    Start Date: 10/19/2023   Expected End Date: 1/19/2024   Priority: Medium   Barriers: No Barriers Identified        Task: Trained patient to use Dexcom G7. Completed 10/19/2023   Note:    DEXCOM G7 CGM TRAINING  Dexcom G7 mobile apps downloaded to phone. Education was provided using "Quick Start Guide" and demo device per Dexcom protocol. [facility] data share set-up.    Patient also set up  and use as back-up      Overview:  5 minute glucose reading updates, trending arrows, BG graph screens, reports screen,  connectivity and functions.   Menus: Trend graph, start sensor, enter BG, events, alerts, settings, replace sensor, stop sensor, and shutdown  Dexcom G7 mobile stephanie/ Settings:                          * Urgent Low: 55 mg/dL                       "    * Urgent Low Soon: On                          * Low Alert: 70 mg/dL; Snooze OFF                          * High Alert: 180 mg/dL; Snooze OFF                           * Signal Loss: ON                          * Brief Sensor Issue: ON     Reviewed additional setting options.  Patient paired sensor using 4-digit code listed on sensor cap..    Reviewed where to find sensor insertion time and expiration date.   Reviewed appropriate calibration techniques.  Reviewed sensor site selection. Patient selected and prepped site using aseptic technique, inserted sensor, applied overlay tape and started session.   Reviewed sensor removal from site. Discussed times to remove sensor per  guidelines include MRI or diatherapy.   Patient able to demonstrate without difficulty. Encouraged to review manual and/or training videos prior to starting another sensor.   Reviewed problem solving aspects of sensor transmission/variables that can disrupt RF transmission.  range 20 feet, but the first 3 hrs keep within 3 feet of transmitter.  Patient instructed on lag time of interstitial fluid from CBG and was advised to treat hypoglycemia and dose insulin based on SMBG values.  Dexcom technical support contact number given and examples of when to contact them discussed.              Follow Up Plan     Follow up in about 2 weeks (around 11/2/2023) for E10.65.    Today's care plan and follow up schedule was discussed with patient.  Leesa verbalized understanding of the care plan, goals, and agrees to follow up plan.        The patient was encouraged to communicate with his/her health care provider/physician and care team regarding his/her condition(s) and treatment.  I provided the patient with my contact information today and encouraged to contact me via phone or [facility] Patient Portal as needed.     Length of Visit   Total Time: 60 Minutes

## 2023-10-20 ENCOUNTER — TELEPHONE (OUTPATIENT)
Dept: DIABETES | Facility: CLINIC | Age: 63
End: 2023-10-20
Payer: COMMERCIAL

## 2023-10-20 NOTE — TELEPHONE ENCOUNTER
Patient called regarding insulin. Patient stated she was given 4 pens and usually gets 5 pens for novolog. Patient informed that this prescription is only 4, patient voiced understanding.  ----- Message from Franco Wheeler sent at 10/20/2023 10:53 AM CDT -----  Contact: [phone]  Patient is requesting a call in regards to her insulin. Please call pt back at [phone]. Thanks KB

## 2023-10-24 ENCOUNTER — PATIENT MESSAGE (OUTPATIENT)
Dept: DIABETES | Facility: CLINIC | Age: 63
End: 2023-10-24

## 2023-10-24 ENCOUNTER — OFFICE VISIT (OUTPATIENT)
Dept: DIABETES | Facility: CLINIC | Age: 63
End: 2023-10-24
Payer: COMMERCIAL

## 2023-10-24 DIAGNOSIS — E78.5 HYPERLIPIDEMIA DUE TO TYPE 1 DIABETES MELLITUS: ICD-10-CM

## 2023-10-24 DIAGNOSIS — E10.59 HYPERTENSION ASSOCIATED WITH TYPE 1 DIABETES MELLITUS: ICD-10-CM

## 2023-10-24 DIAGNOSIS — E04.2 MULTIPLE THYROID NODULES: ICD-10-CM

## 2023-10-24 DIAGNOSIS — E10.69 HYPERLIPIDEMIA DUE TO TYPE 1 DIABETES MELLITUS: ICD-10-CM

## 2023-10-24 DIAGNOSIS — F33.9 MAJOR DEPRESSION, RECURRENT, CHRONIC: ICD-10-CM

## 2023-10-24 DIAGNOSIS — I15.2 HYPERTENSION ASSOCIATED WITH TYPE 1 DIABETES MELLITUS: ICD-10-CM

## 2023-10-24 DIAGNOSIS — E10.65 TYPE 1 DIABETES MELLITUS WITH HYPERGLYCEMIA, WITH LONG-TERM CURRENT USE OF INSULIN: Primary | ICD-10-CM

## 2023-10-24 DIAGNOSIS — G72.0 STATIN MYOPATHY: ICD-10-CM

## 2023-10-24 DIAGNOSIS — T46.6X5A STATIN MYOPATHY: ICD-10-CM

## 2023-10-24 DIAGNOSIS — G47.33 OSA (OBSTRUCTIVE SLEEP APNEA): ICD-10-CM

## 2023-10-24 DIAGNOSIS — Z98.890 HISTORY OF THYROID SURGERY: ICD-10-CM

## 2023-10-24 PROCEDURE — 3046F PR MOST RECENT HEMOGLOBIN A1C LEVEL > 9.0%: ICD-10-PCS | Mod: CPTII,95,, | Performed by: PHYSICIAN ASSISTANT

## 2023-10-24 PROCEDURE — 99214 OFFICE O/P EST MOD 30 MIN: CPT | Mod: 95,,, | Performed by: PHYSICIAN ASSISTANT

## 2023-10-24 PROCEDURE — 95251 PR GLUCOSE MONITOR, 72 HOUR, PHYS INTERP: ICD-10-PCS | Mod: NDTC,S$GLB,, | Performed by: PHYSICIAN ASSISTANT

## 2023-10-24 PROCEDURE — 3060F PR POS MICROALBUMINURIA RESULT DOCUMENTED/REVIEW: ICD-10-PCS | Mod: CPTII,95,, | Performed by: PHYSICIAN ASSISTANT

## 2023-10-24 PROCEDURE — 3066F PR DOCUMENTATION OF TREATMENT FOR NEPHROPATHY: ICD-10-PCS | Mod: CPTII,95,, | Performed by: PHYSICIAN ASSISTANT

## 2023-10-24 PROCEDURE — 3046F HEMOGLOBIN A1C LEVEL >9.0%: CPT | Mod: CPTII,95,, | Performed by: PHYSICIAN ASSISTANT

## 2023-10-24 PROCEDURE — 3060F POS MICROALBUMINURIA REV: CPT | Mod: CPTII,95,, | Performed by: PHYSICIAN ASSISTANT

## 2023-10-24 PROCEDURE — 95251 CONT GLUC MNTR ANALYSIS I&R: CPT | Mod: NDTC,S$GLB,, | Performed by: PHYSICIAN ASSISTANT

## 2023-10-24 PROCEDURE — 3066F NEPHROPATHY DOC TX: CPT | Mod: CPTII,95,, | Performed by: PHYSICIAN ASSISTANT

## 2023-10-24 PROCEDURE — 99214 PR OFFICE/OUTPT VISIT, EST, LEVL IV, 30-39 MIN: ICD-10-PCS | Mod: 95,,, | Performed by: PHYSICIAN ASSISTANT

## 2023-10-24 NOTE — PROGRESS NOTES
PCP: Rosangela Cat MD    Subjective:     Chief Complaint: Diabetes - Established Patient    Established Patient - Audio Only Telehealth Visit     The patient location is: Home  The chief complaint leading to consultation is: Diabetes follow up  Visit type: Virtual visit with audio only (telephone)  Total Time Spent with Patient: 20 minutes     The reason for the audio only service rather than synchronous audio and video virtual visit was related to technical difficulties or patient preference/necessity.     Each patient to whom I provide medical services by telemedicine is:  (1) informed of the relationship between the physician and patient and the respective role of any other health care provider with respect to management of the patient; and (2) notified that they may decline to receive medical services by telemedicine and may withdraw from such care at any time. Patient verbally consented to receive this service via voice-only telephone call.    This service was not originating from a related E/M service provided within the previous 7 days nor will  to an E/M service or procedure within the next 24 hours or my soonest available appointment.  Prevailing standard of care was able to be met in this audio-only visit.       HISTORY OF PRESENT ILLNESS: 63 y.o.   female presenting for diabetes management visit.   The patient's last visit with me was on 10/16/2023.  Patient has had Type I diabetes due to low C peptide since 10 years ago.  Pertinent to decision making is the following comorbidities: HTN, HLD and Multinodular goiter s/p thyroid surgery, GENTRY on CPAP, Statin Intolerance, and Medical Noncompliance  Patient has the following Diabetes complications: without complications  She  has attended diabetes education in the past.      Patient's most recent A1c of 10.0% was completed 1 weeks ago.   Patient states since Her last A1c Her blood glucose levels have been  improved  throughout the day .    Patient monitors blood glucose 4 times per day and Continuously with personal CGM Dexcom.   Patient blood glucose monitoring device will be uploaded into Media Section today.    Patient report shows some mixed coverage of meal size dosing with undercorrection of HC dosing and overcorrection of LC dosing. Baseline euglycemia.   Patient endorses the following diabetes related symptoms:  Depression and Anxiety . Previously seeing therapy and psychiatry. Has history of medication side effects with depression meds including agitation and SI.   Patient is due today for the following diabetes-related health maintenance standards: Foot Exam , Eye Exam, Influenza Vaccine, COVID-19 Vaccine , and Mammogram .   She denies recent hospital admissions or emergency room visits. Patient denies known DKA admissions.  She denies having hypoglycemia.  Patient's concerns today include glycemic control. Of note, patient reports avoiding carb intake - mostly eating protein and nonstarchy vegetables.   Patient medication regimen is as below.     CURRENT DM MEDICATIONS:   Basaglar - not taking  Novolog as below - before every meal (3x/day) and every 3 hours as needed            Meal Size:               - Not Eatin units of Novolog ( + correction )  - Carbs with Meal: 12 units of Novolog ( + correction )                 To Correct Blood Sugar (TO add to meal time dose OR to be used as correction after 3 hours if not eating:   BG 61 - 80: -1 units  BG 81 - 120: +0 units   - 140: +1 units   - 180: +2 units   - 200: +3 units    - 220: +4 units   - 240: +5 units   - 260: +6 units   - 280: +7 units   - 300: +8 units   - 320: +9 units   - 340: +10 units   - 380: +11 units   - 400: +12 units  BG +400: +13 units - MAX     Patient has failed the following Diabetes medications:   Glucovance       Labs Reviewed.       Lab Results   Component Value Date    CPEPTIDE 1.39  04/10/2023     Lab Results   Component Value Date    GLUTAMICACID 0.00 04/10/2023          //   , There is no height or weight on file to calculate BMI.  Her blood sugar in clinic today is:    Lab Results   Component Value Date    POCGLU 234 (A) 10/16/2023       Review of Systems   Constitutional:  Negative for activity change, appetite change, chills and fever.   HENT:  Negative for dental problem, mouth sores, nosebleeds, sore throat and trouble swallowing.    Eyes:  Negative for pain and discharge.   Respiratory:  Negative for shortness of breath, wheezing and stridor.    Cardiovascular:  Negative for chest pain, palpitations and leg swelling.   Gastrointestinal:  Negative for abdominal pain, diarrhea, nausea and vomiting.   Endocrine: Negative for polydipsia, polyphagia and polyuria.   Genitourinary:  Negative for dysuria, frequency and urgency.   Musculoskeletal:  Negative for joint swelling and myalgias.   Skin:  Negative for rash and wound.   Neurological:  Negative for dizziness, syncope, weakness and headaches.   Psychiatric/Behavioral:  Negative for behavioral problems and dysphoric mood.          Diabetes Management Status  Statin: Not taking  ACE/ARB: Taking    Screening or Prevention Patient's value Goal Complete/Controlled?   HgA1C Testing and Control   Lab Results   Component Value Date    HGBA1C 10.0 (H) 10/16/2023      Annually/Less than 8% No   Lipid profile : 04/10/2023 Annually Yes   LDL control Lab Results   Component Value Date    LDLCALC 177.0 (H) 04/10/2023    Annually/Less than 100 mg/dl  No   Nephropathy screening Lab Results   Component Value Date    MICALBCREAT 47.8 (H) 10/16/2023     Lab Results   Component Value Date    PROTEINUA Negative 09/27/2022    Annually Yes   Blood pressure BP Readings from Last 1 Encounters:   10/16/23 139/89    Less than 140/90 Yes   Dilated retinal exam : 01/16/2017 Annually No    Foot exam   : 12/14/2020 Annually No     Social History     Socioeconomic  History    Marital status:     Number of children: 3   Occupational History     Employer: [employer]   Tobacco Use    Smoking status: Former     Current packs/day: 0.00     Average packs/day: 1 pack/day for 28.0 years (28.0 ttl pk-yrs)     Types: Cigarettes     Start date:      Quit date: 2007     Years since quittin.8    Smokeless tobacco: Never    Tobacco comments:     smoked off and on from [id number] about 15 years total years smoking.   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     Social Determinants of Health     Financial Resource Strain: Low Risk  (2022)    Overall Financial Resource Strain (CARDIA)     Difficulty of Paying Living Expenses: Not hard at all   Food Insecurity: No Food Insecurity (2022)    Hunger Vital Sign     Worried About Running Out of Food in the Last Year: Never true     Ran Out of Food in the Last Year: Never true   Transportation Needs: No Transportation Needs (2022)    PRAPARE - Transportation     Lack of Transportation (Medical): No     Lack of Transportation (Non-Medical): No   Physical Activity: Insufficiently Active (2022)    Exercise Vital Sign     Days of Exercise per Week: 1 day     Minutes of Exercise per Session: 20 min   Stress: Stress Concern Present (2022)    [employer] - Occupational Stress Questionnaire     Feeling of Stress : Very much   Social Connections: Unknown (2022)    Social Connection and Isolation Panel [NHANES]     Frequency of Communication with Friends and Family: Twice a week     Frequency of Social Gatherings with Friends and Family: Never     Active Member of Clubs or Organizations: No     Attends Club or Organization Meetings: Never     Marital Status:     Housing Stability: Low Risk  (9/23/2022)    Housing Stability Vital Sign     Unable to Pay for Housing in the Last Year: No     Number of Places Lived in the Last Year: 1     Unstable Housing in the Last Year: No     Past Medical History:   Diagnosis Date    Abnormal Pap smear of vagina     Arthritis     Left shoulder    Diabetes mellitus type II     Dr Ayala manages DM     Hyperlipidemia     Hypertension     Thyroid disease     Dr Torres marrioneaux- goitre+       Objective:        Physical Exam  Neurological:      Mental Status: She is alert and oriented to person, place, and time. Mental status is at baseline.   Psychiatric:         Mood and Affect: Mood normal.         Behavior: Behavior normal.         Thought Content: Thought content normal.         Judgment: Judgment normal.           Assessment / Plan:     Type 1 diabetes mellitus with hyperglycemia, with long-term current use of insulin    Major depression, recurrent, chronic    Hypertension associated with type 1 diabetes mellitus    Hyperlipidemia due to type 1 diabetes mellitus    History of thyroid surgery    Multiple thyroid nodules    Statin myopathy    GENTRY (obstructive sleep apnea)      Additional Plan Details:    - POCT Glucose  - Encouraged continuation of lifestyle changes including regular exercise and limiting carbohydrates to 30-45 grams per meal threes times daily and 15 grams per snack with a limit of two daily.   - Encouraged continued monitoring of blood glucose with maintenance of 4 times daily and Continuously with personal CGM Dexcom.     - Current DM Medication Regimen: Change Novolog per meal size and correction dosing per scale below.   - Health Maintenance standards addressed today: Foot Exam - deferred by patient today because Telemedicine or Telephone visit, Eye Exam - will be completed outside of [facility] and patient will schedule, Flu shot - patient declined, and COVID - 19 Vaccine - Declined by patient  - Nursing Visit:  Patient is age 79 or younger with an A1c of 7.5 or greater and will not need nursing visit at this time .   - Follow up in 4 weeks with A1c prior.     CURRENT DM MEDICATIONS:   Novolog as below - before every meal (3x/day) and every 3 hours as needed             Meal Size:               - Not Eatin units of Novolog ( + correction)   - Low Carb Meal: 8 units of Novolog ( + correction)  - Regular Carb Meal: 12 units of Novolog ( + correction)  - Heavy Carb Meal: 14 units of Novolog ( + correction)                 To Correct Blood Sugar (TO add to meal time dose OR to be used as correction after 3 hours if not eating:   BG 61 - 80: -1 units  BG 81 - 120: +0 units   - 140: +1 units   - 180: +2 units   - 200: +3 units    - 220: +4 units   - 240: +5 units   - 260: +6 units   - 280: +7 units   - 300: +8 units   - 320: +9 units   - 340: +10 units   - 380: +11 units   - 400: +12 units  BG +400: +13 units - MAX Blakeney McKnight, PA-C [facility]

## 2023-10-30 NOTE — PATIENT INSTRUCTIONS
CURRENT DM MEDICATIONS:   Novolog as below - before every meal (3x/day) and every 3 hours as needed             Meal Size:               - Not Eatin units of Novolog ( + correction)   - Low Carb Meal: 8 units of Novolog ( + correction)  - Regular Carb Meal: 12 units of Novolog ( + correction)  - Heavy Carb Meal: 14 units of Novolog ( + correction)                 To Correct Blood Sugar (TO add to meal time dose OR to be used as correction after 3 hours if not eating:   BG 61 - 80: -1 units  BG 81 - 120: +0 units   - 140: +1 units   - 180: +2 units   - 200: +3 units    - 220: +4 units   - 240: +5 units   - 260: +6 units   - 280: +7 units   - 300: +8 units   - 320: +9 units   - 340: +10 units   - 380: +11 units   - 400: +12 units  BG +400: +13 units - MAX

## 2023-11-22 DIAGNOSIS — Z12.31 OTHER SCREENING MAMMOGRAM: ICD-10-CM

## 2023-11-29 ENCOUNTER — OFFICE VISIT (OUTPATIENT)
Dept: DIABETES | Facility: CLINIC | Age: 63
End: 2023-11-29
Payer: COMMERCIAL

## 2023-11-29 ENCOUNTER — PATIENT MESSAGE (OUTPATIENT)
Dept: DIABETES | Facility: CLINIC | Age: 63
End: 2023-11-29

## 2023-11-29 DIAGNOSIS — E04.2 MULTIPLE THYROID NODULES: ICD-10-CM

## 2023-11-29 DIAGNOSIS — E10.59 HYPERTENSION ASSOCIATED WITH TYPE 1 DIABETES MELLITUS: ICD-10-CM

## 2023-11-29 DIAGNOSIS — E78.5 HYPERLIPIDEMIA DUE TO TYPE 1 DIABETES MELLITUS: ICD-10-CM

## 2023-11-29 DIAGNOSIS — T46.6X5A STATIN MYOPATHY: ICD-10-CM

## 2023-11-29 DIAGNOSIS — I15.2 HYPERTENSION ASSOCIATED WITH TYPE 1 DIABETES MELLITUS: ICD-10-CM

## 2023-11-29 DIAGNOSIS — Z98.890 HISTORY OF THYROID SURGERY: ICD-10-CM

## 2023-11-29 DIAGNOSIS — E10.65 TYPE 1 DIABETES MELLITUS WITH HYPERGLYCEMIA, WITH LONG-TERM CURRENT USE OF INSULIN: Primary | ICD-10-CM

## 2023-11-29 DIAGNOSIS — E10.69 HYPERLIPIDEMIA DUE TO TYPE 1 DIABETES MELLITUS: ICD-10-CM

## 2023-11-29 DIAGNOSIS — G72.0 STATIN MYOPATHY: ICD-10-CM

## 2023-11-29 DIAGNOSIS — G47.33 OSA (OBSTRUCTIVE SLEEP APNEA): ICD-10-CM

## 2023-11-29 PROCEDURE — 3066F PR DOCUMENTATION OF TREATMENT FOR NEPHROPATHY: ICD-10-PCS | Mod: CPTII,95,, | Performed by: PHYSICIAN ASSISTANT

## 2023-11-29 PROCEDURE — 3046F HEMOGLOBIN A1C LEVEL >9.0%: CPT | Mod: CPTII,95,, | Performed by: PHYSICIAN ASSISTANT

## 2023-11-29 PROCEDURE — 95251 CONT GLUC MNTR ANALYSIS I&R: CPT | Mod: NDTC,,, | Performed by: PHYSICIAN ASSISTANT

## 2023-11-29 PROCEDURE — 3046F PR MOST RECENT HEMOGLOBIN A1C LEVEL > 9.0%: ICD-10-PCS | Mod: CPTII,95,, | Performed by: PHYSICIAN ASSISTANT

## 2023-11-29 PROCEDURE — 3066F NEPHROPATHY DOC TX: CPT | Mod: CPTII,95,, | Performed by: PHYSICIAN ASSISTANT

## 2023-11-29 PROCEDURE — 99213 PR OFFICE/OUTPT VISIT, EST, LEVL III, 20-29 MIN: ICD-10-PCS | Mod: 95,,, | Performed by: PHYSICIAN ASSISTANT

## 2023-11-29 PROCEDURE — 99213 OFFICE O/P EST LOW 20 MIN: CPT | Mod: 95,,, | Performed by: PHYSICIAN ASSISTANT

## 2023-11-29 PROCEDURE — 3060F PR POS MICROALBUMINURIA RESULT DOCUMENTED/REVIEW: ICD-10-PCS | Mod: CPTII,95,, | Performed by: PHYSICIAN ASSISTANT

## 2023-11-29 PROCEDURE — 3060F POS MICROALBUMINURIA REV: CPT | Mod: CPTII,95,, | Performed by: PHYSICIAN ASSISTANT

## 2023-11-29 PROCEDURE — 95251 PR GLUCOSE MONITOR, 72 HOUR, PHYS INTERP: ICD-10-PCS | Mod: NDTC,,, | Performed by: PHYSICIAN ASSISTANT

## 2023-11-29 NOTE — PROGRESS NOTES
PCP: Rosangela Cat MD    Subjective:     Chief Complaint: Diabetes - Established Patient    Established Patient - Audio Only Telehealth Visit     The patient location is: Home  The chief complaint leading to consultation is: Diabetes follow up  Visit type: Virtual visit with audio only (telephone)  Total Time Spent with Patient: 12 minutes     The reason for the audio only service rather than synchronous audio and video virtual visit was related to technical difficulties or patient preference/necessity.     Each patient to whom I provide medical services by telemedicine is:  (1) informed of the relationship between the physician and patient and the respective role of any other health care provider with respect to management of the patient; and (2) notified that they may decline to receive medical services by telemedicine and may withdraw from such care at any time. Patient verbally consented to receive this service via voice-only telephone call.    This service was not originating from a related E/M service provided within the previous 7 days nor will  to an E/M service or procedure within the next 24 hours or my soonest available appointment.  Prevailing standard of care was able to be met in this audio-only visit.       HISTORY OF PRESENT ILLNESS: 63 y.o.   female presenting for diabetes management visit.   The patient's last visit with me was on 10/16/2023.  Patient has had Type I diabetes due to low C peptide since 10 years ago.  Pertinent to decision making is the following comorbidities: HTN, HLD and Multinodular goiter s/p thyroid surgery, GENTRY on CPAP, Statin Intolerance, and Medical Noncompliance  Patient has the following Diabetes complications: without complications  She  has attended diabetes education in the past.      Patient's most recent A1c of 10.0% was completed 6 weeks ago.   Patient states since Her last A1c Her blood glucose levels have been high  after meals.   Patient  monitors blood glucose 4 times per day and Continuously with personal CGM Dexcom.   Patient blood glucose monitoring device will be uploaded into Media Section today.    Patient report shows baseline euglycemia with undercorrection of medium and low carb dosing. Some postprandial hyperglycemia related to missed meal time dosing related to chronic depression.   Patient endorses the following diabetes related symptoms:  Depression and Anxiety . Previously seeing therapy and psychiatry. Has history of medication side effects with depression meds including agitation and SI.   Patient is due today for the following diabetes-related health maintenance standards: Foot Exam , Eye Exam, Influenza Vaccine, COVID-19 Vaccine , and Mammogram .   She denies recent hospital admissions or emergency room visits. Patient denies known DKA admissions.  She denies having hypoglycemia.  Patient's concerns today include glycemic control. Of note, patient reports avoiding carb intake - mostly eating protein and nonstarchy vegetables.   Patient medication regimen is as below.     CURRENT DM MEDICATIONS:   Novolog as below - before every meal (3x/day) and every 3 hours as needed             Meal Size:               - Not Eatin units of Novolog ( + correction)   - Low Carb Meal: 6 units of Novolog ( + correction)  - Regular Carb Meal: 10 units of Novolog ( + correction)  - Heavy Carb Meal: 10 units of Novolog ( + correction)                 To Correct Blood Sugar (TO add to meal time dose OR to be used as correction after 3 hours if not eating:   BG 61 - 80: -1 units  BG 81 - 120: +0 units   - 140: +1 units   - 180: +2 units   - 200: +3 units    - 220: +4 units   - 240: +5 units   - 260: +6 units   - 280: +7 units   - 300: +8 units   - 320: +9 units   - 340: +10 units   - 380: +11 units   - 400: +12 units  BG +400: +13 units - MAX     Patient has failed the following  Diabetes medications:   Glucovance       Labs Reviewed.       Lab Results   Component Value Date    CPEPTIDE 1.39 04/10/2023     Lab Results   Component Value Date    GLUTAMICACID 0.00 04/10/2023          //   , There is no height or weight on file to calculate BMI.  Her blood sugar in clinic today is:    Lab Results   Component Value Date    POCGLU 234 (A) 10/16/2023       Review of Systems   Constitutional:  Negative for activity change, appetite change, chills and fever.   HENT:  Negative for dental problem, mouth sores, nosebleeds, sore throat and trouble swallowing.    Eyes:  Negative for pain and discharge.   Respiratory:  Negative for shortness of breath, wheezing and stridor.    Cardiovascular:  Negative for chest pain, palpitations and leg swelling.   Gastrointestinal:  Negative for abdominal pain, diarrhea, nausea and vomiting.   Endocrine: Negative for polydipsia, polyphagia and polyuria.   Genitourinary:  Negative for dysuria, frequency and urgency.   Musculoskeletal:  Negative for joint swelling and myalgias.   Skin:  Negative for rash and wound.   Neurological:  Negative for dizziness, syncope, weakness and headaches.   Psychiatric/Behavioral:  Negative for behavioral problems and dysphoric mood.          Diabetes Management Status  Statin: Not taking  ACE/ARB: Taking    Screening or Prevention Patient's value Goal Complete/Controlled?   HgA1C Testing and Control   Lab Results   Component Value Date    HGBA1C 10.0 (H) 10/16/2023      Annually/Less than 8% No   Lipid profile : 04/10/2023 Annually Yes   LDL control Lab Results   Component Value Date    LDLCALC 177.0 (H) 04/10/2023    Annually/Less than 100 mg/dl  No   Nephropathy screening Lab Results   Component Value Date    MICALBCREAT 47.8 (H) 10/16/2023     Lab Results   Component Value Date    PROTEINUA Negative 09/27/2022    Annually Yes   Blood pressure BP Readings from Last 1 Encounters:   10/16/23 139/89    Less than 140/90 Yes   Dilated  retinal exam : 2017 Annually No    Foot exam   : 2020 Annually No     Social History     Socioeconomic History    Marital status:     Number of children: 3   Occupational History     Employer: [employer]   Tobacco Use    Smoking status: Former     Current packs/day: 0.00     Average packs/day: 1 pack/day for 28.0 years (28.0 ttl pk-yrs)     Types: Cigarettes     Start date:      Quit date: 2007     Years since quittin.8    Smokeless tobacco: Never    Tobacco comments:     smoked off and on from [id number] about 15 years total years smoking.   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     Social Determinants of Health     Financial Resource Strain: Low Risk  (2022)    Overall Financial Resource Strain (CARDIA)     Difficulty of Paying Living Expenses: Not hard at all   Food Insecurity: No Food Insecurity (2022)    Hunger Vital Sign     Worried About Running Out of Food in the Last Year: Never true     Ran Out of Food in the Last Year: Never true   Transportation Needs: No Transportation Needs (2022)    PRAPARE - Transportation     Lack of Transportation (Medical): No     Lack of Transportation (Non-Medical): No   Physical Activity: Insufficiently Active (2022)    Exercise Vital Sign     Days of Exercise per Week: 1 day     Minutes of Exercise per Session: 20 min   Stress: Stress Concern Present (2022)    [employer] [place] [employer] - Occupational Stress Questionnaire     Feeling of Stress : Very much   Social Connections: Unknown (2022)    Social Connection and Isolation Panel [NHANES]     Frequency of Communication with Friends and Family: Twice a week     Frequency of Social Gatherings with Friends and Family: Never      Active Member of Clubs or Organizations: No     Attends Club or Organization Meetings: Never     Marital Status:    Housing Stability: Low Risk  (9/23/2022)    Housing Stability Vital Sign     Unable to Pay for Housing in the Last Year: No     Number of Places Lived in the Last Year: 1     Unstable Housing in the Last Year: No     Past Medical History:   Diagnosis Date    Abnormal Pap smear of vagina     Arthritis     Left shoulder    Diabetes mellitus type II     Dr Ayala manages DM     Hyperlipidemia     Hypertension     Thyroid disease     Dr Torres marrioneaux- goitre+       Objective:        Physical Exam  Neurological:      Mental Status: She is alert and oriented to person, place, and time. Mental status is at baseline.   Psychiatric:         Mood and Affect: Mood normal.         Behavior: Behavior normal.         Thought Content: Thought content normal.         Judgment: Judgment normal.           Assessment / Plan:     Type 1 diabetes mellitus with hyperglycemia, with long-term current use of insulin  -     Hemoglobin A1C; Standing    Hypertension associated with type 1 diabetes mellitus    Hyperlipidemia due to type 1 diabetes mellitus    History of thyroid surgery    Multiple thyroid nodules    Statin myopathy    GENTRY (obstructive sleep apnea)      Additional Plan Details:    - POCT Glucose  - Encouraged continuation of lifestyle changes including regular exercise and limiting carbohydrates to 30-45 grams per meal threes times daily and 15 grams per snack with a limit of two daily.   - Encouraged continued monitoring of blood glucose with maintenance of 4 times daily and Continuously with personal CGM Dexcom.     - Current DM Medication Regimen: Change Novolog per meal size and correction dosing per scale below. Focus on compliance of dosing.   - Depression: contributing to patient having issues with managing compliance of Novolog dosing; Referral to Psychiatry scheduled for next Available in April  ; offered sooner therapy referral - pt declined. Will send message to Psychiatry about placing on wait list for sooner appt.   - Nonfasting labs before end of year  - Health Maintenance standards addressed today: Foot Exam - deferred by patient today because Telemedicine or Telephone visit, Eye Exam - will be completed outside of [facility] and patient will schedule, Flu shot - patient declined, COVID - 19 Vaccine - Declined by patient, and All vaccines declined by pt  - Nursing Visit: Patient is age 79 or younger with an A1c of 7.5 or greater and will not need nursing visit at this time .   - Follow up in 6 weeks with A1c prior with DM Management partner.     CURRENT DM MEDICATIONS:   Novolog as below - before every meal (3x/day) and every 3 hours as needed             Meal Size:               - Not Eatin units of Novolog ( + correction)   - Low Carb Meal: 8 units of Novolog ( + correction)  - Regular Carb Meal: 12 units of Novolog ( + correction)  - Heavy Carb Meal: 14 units of Novolog ( + correction)                 To Correct Blood Sugar (TO add to meal time dose OR to be used as correction after 3 hours if not eating:   BG 61 - 80: -1 units  BG 81 - 120: +0 units   - 140: +1 units   - 180: +2 units   - 200: +3 units    - 220: +4 units   - 240: +5 units   - 260: +6 units   - 280: +7 units   - 300: +8 units   - 320: +9 units   - 340: +10 units   - 380: +11 units   - 400: +12 units  BG +400: +13 units - MAX Blakeney McKnight, PA-C [facility]

## 2023-11-29 NOTE — Clinical Note
"1/3 virtual appt with Rowan at  "[facility] pt - Clarity sharing"  A1c last week of Dec at [facility]  April virtual with me in pm "Clarity sharing" "

## 2024-01-10 ENCOUNTER — TELEPHONE (OUTPATIENT)
Dept: DIABETES | Facility: CLINIC | Age: 64
End: 2024-01-10
Payer: COMMERCIAL

## 2024-01-18 ENCOUNTER — PATIENT OUTREACH (OUTPATIENT)
Dept: ADMINISTRATIVE | Facility: HOSPITAL | Age: 64
End: 2024-01-18
Payer: COMMERCIAL

## 2024-01-18 ENCOUNTER — PATIENT MESSAGE (OUTPATIENT)
Dept: ADMINISTRATIVE | Facility: HOSPITAL | Age: 64
End: 2024-01-18
Payer: COMMERCIAL

## 2024-01-18 NOTE — PROGRESS NOTES
PCP VISIT > 12 MONTHS: per chart review pt is overdue for annual visit, attempted to contact pt no ans, lvm, sent portal message.

## 2024-02-07 ENCOUNTER — PATIENT OUTREACH (OUTPATIENT)
Dept: ADMINISTRATIVE | Facility: HOSPITAL | Age: 64
End: 2024-02-07
Payer: COMMERCIAL

## 2024-02-07 NOTE — PROGRESS NOTES
Patient not seen by PCP in 12 months or longer Report: Called Pt to schedule labs & PCP visit, Pt did not answer left voicemail with callback number. Last Portal msg sent shows as READ.

## 2024-04-03 ENCOUNTER — PATIENT MESSAGE (OUTPATIENT)
Dept: PULMONOLOGY | Facility: CLINIC | Age: 64
End: 2024-04-03
Payer: COMMERCIAL

## 2024-04-09 ENCOUNTER — HOSPITAL ENCOUNTER (OUTPATIENT)
Dept: RADIOLOGY | Facility: HOSPITAL | Age: 64
Discharge: HOME | End: 2024-04-09
Attending: FAMILY MEDICINE
Payer: COMMERCIAL

## 2024-04-09 VITALS — BODY MASS INDEX: 22.55 KG/M2 | WEIGHT: 114.88 LBS | HEIGHT: 60 IN

## 2024-04-09 DIAGNOSIS — Z12.31 OTHER SCREENING MAMMOGRAM: ICD-10-CM

## 2024-04-09 PROCEDURE — 77063 BREAST TOMOSYNTHESIS BI: CPT | Mod: 26,,, | Performed by: RADIOLOGY

## 2024-04-09 PROCEDURE — 77067 SCR MAMMO BI INCL CAD: CPT | Mod: 26,,, | Performed by: RADIOLOGY

## 2024-04-09 PROCEDURE — 77063 BREAST TOMOSYNTHESIS BI: CPT | Mod: TC

## 2024-05-10 ENCOUNTER — PATIENT OUTREACH (OUTPATIENT)
Dept: ADMINISTRATIVE | Facility: HOSPITAL | Age: 64
End: 2024-05-10
Payer: COMMERCIAL

## 2024-05-10 ENCOUNTER — TELEPHONE (OUTPATIENT)
Dept: PSYCHIATRY | Facility: CLINIC | Age: 64
End: 2024-05-10
Payer: COMMERCIAL

## 2024-05-10 NOTE — PROGRESS NOTES
VBHM Score: 4     Eye Exam  Hemoglobin A1c  Lipid Panel  Foot Exam    RSV Vaccine                  Health Maintenance Topic(s) Outreach Outcomes & Actions Taken:    Eye Exam - Outreach Outcomes & Actions Taken  : Overdue    Lab(s) - Outreach Outcomes & Actions Taken  : Overdue    Diabetic Foot Exam - Outreach Outcomes & Actions Taken  : Needs PCP appt to complete         Additional Notes:  Attempted to contact pt no ans, lvm, pt has appt on 5.14.24, would like to schedule pt fasting labs that day waiting for pt to respond to message.               Care Management, Digital Medicine, and/or Education Referrals    OPCM Risk Score: 15.6         Next Steps - Referral Actions: No ans, lvm

## 2024-05-13 ENCOUNTER — PATIENT MESSAGE (OUTPATIENT)
Dept: ADMINISTRATIVE | Facility: HOSPITAL | Age: 64
End: 2024-05-13
Payer: COMMERCIAL

## 2024-05-14 ENCOUNTER — OFFICE VISIT (OUTPATIENT)
Dept: PSYCHIATRY | Facility: CLINIC | Age: 64
End: 2024-05-14
Payer: COMMERCIAL

## 2024-05-14 ENCOUNTER — DOCUMENTATION ONLY (OUTPATIENT)
Dept: PSYCHIATRY | Facility: CLINIC | Age: 64
End: 2024-05-14
Payer: COMMERCIAL

## 2024-05-14 VITALS
HEART RATE: 80 BPM | SYSTOLIC BLOOD PRESSURE: 156 MMHG | WEIGHT: 149.25 LBS | DIASTOLIC BLOOD PRESSURE: 90 MMHG | BODY MASS INDEX: 29.15 KG/M2

## 2024-05-14 DIAGNOSIS — F33.2 SEVERE EPISODE OF RECURRENT MAJOR DEPRESSIVE DISORDER, WITHOUT PSYCHOTIC FEATURES: Primary | ICD-10-CM

## 2024-05-14 DIAGNOSIS — F43.10 PTSD (POST-TRAUMATIC STRESS DISORDER): ICD-10-CM

## 2024-05-14 DIAGNOSIS — F43.21 GRIEF: ICD-10-CM

## 2024-05-14 PROCEDURE — 3077F SYST BP >= 140 MM HG: CPT | Mod: CPTII,S$GLB,, | Performed by: PSYCHOLOGIST

## 2024-05-14 PROCEDURE — 99999 PR PBB SHADOW E&M-EST. PATIENT-LVL II: CPT | Mod: PBBFAC,,, | Performed by: PSYCHOLOGIST

## 2024-05-14 PROCEDURE — 1159F MED LIST DOCD IN RCRD: CPT | Mod: CPTII,S$GLB,, | Performed by: PSYCHOLOGIST

## 2024-05-14 PROCEDURE — 3080F DIAST BP >= 90 MM HG: CPT | Mod: CPTII,S$GLB,, | Performed by: PSYCHOLOGIST

## 2024-05-14 PROCEDURE — 99417 PROLNG OP E/M EACH 15 MIN: CPT | Mod: S$GLB,,, | Performed by: PSYCHOLOGIST

## 2024-05-14 PROCEDURE — 99205 OFFICE O/P NEW HI 60 MIN: CPT | Mod: S$GLB,,, | Performed by: PSYCHOLOGIST

## 2024-05-14 PROCEDURE — 3008F BODY MASS INDEX DOCD: CPT | Mod: CPTII,S$GLB,, | Performed by: PSYCHOLOGIST

## 2024-05-14 NOTE — PROGRESS NOTES
CASE MANAGEMENT REFERRAL    MRN: [id number]  : 1960  Reason for referral: needs IOP--severe depression, trauma history with high anxiety, and complicated grief

## 2024-05-14 NOTE — PATIENT INSTRUCTIONS

## 2024-05-15 ENCOUNTER — TELEPHONE (OUTPATIENT)
Dept: PSYCHIATRY | Facility: CLINIC | Age: 64
End: 2024-05-15
Payer: COMMERCIAL

## 2024-05-21 ENCOUNTER — TELEPHONE (OUTPATIENT)
Dept: PSYCHIATRY | Facility: CLINIC | Age: 64
End: 2024-05-21
Payer: COMMERCIAL

## 2024-05-21 NOTE — TELEPHONE ENCOUNTER
Called Leesa to check on her--she said that she has her days; she stays in a lot--does not go outside much. She watches TV--she is not taking any psychotropic medicines at this time. She has an appt tomorrow with case mgmt to discuss IOP options; she will keep us updated on where she goes

## 2024-05-28 ENCOUNTER — DOCUMENTATION ONLY (OUTPATIENT)
Dept: PSYCHIATRY | Facility: CLINIC | Age: 64
End: 2024-05-28
Payer: COMMERCIAL

## 2024-05-28 NOTE — PROGRESS NOTES
Department of Psychiatry  Case Management Follow-Up      The patient location is: Residence  The chief complaint leading to consultation is: Intensive Outpatient Program Recommended  Visit type: audiovisual  60 minutes of total time spent on the encounter, which includes face to face time and non-face to face time preparing to see the patient (eg, review of referral), Obtaining and/or reviewing separately obtained history, Documenting information in the electronic or other health record, Independently interpreting results of research (not separately reported) and communicating results to the patient/family/caregiver.  Each patient to whom he or she provides medical services by telemedicine is:  (1) informed of the relationship between the physician and patient and the respective role of any other health care provider with respect to management of the patient; and (2) notified that he or she may decline to receive medical services by telemedicine and may withdraw from such care at any time.      Patient Name and   Leesa Le, 1960    Referring Provider  Dr. Ramona Blanca    Diagnosis  1. Need for case management follow-up        Notes    ANITA met with the patient via telehealth on 2024 to follow up after Pt was seen by the psychotherapist. SW explained role and reviewed the referral.     The patient agreed with the referral to case management. The patient agreed to the referral for case management. ANITA informed the patient about the details of enrollment and the services provided by an intensive outpatient program. ANITA explored the available options for IOP with the patient. ANITA provided the patient with the contact details of [facility]. The patient expressed understanding of all the information received. ANITA sent a referral via fax to the agency. ANITA will follow up and continue to remain available for further assistance.    Resources  [facility]   [facility] [address]  [facility] [place] [address]  Phone: [phone] Fax:[phone]     Total Time  60 minutes

## 2024-07-02 ENCOUNTER — TELEPHONE (OUTPATIENT)
Dept: DIABETES | Facility: CLINIC | Age: 64
End: 2024-07-02
Payer: COMMERCIAL

## 2024-07-02 DIAGNOSIS — E10.65 TYPE 1 DIABETES MELLITUS WITH HYPERGLYCEMIA, WITH LONG-TERM CURRENT USE OF INSULIN: Primary | ICD-10-CM

## 2024-07-02 NOTE — TELEPHONE ENCOUNTER
Attempt to call patient no answer left voice mail for pt to return my call pt - per terrance       Please let pt know that oral surgery team called and needs her to get in and check on blood sugars before they do surgery on her. Can you see if she can do a visit with jase next tues in one of her openings.    I will put in labs for her. Please sched prior to visit.    Please also see if using Dexcom. If not, can she restart before visit with jase

## 2024-07-03 ENCOUNTER — TELEPHONE (OUTPATIENT)
Dept: DIABETES | Facility: CLINIC | Age: 64
End: 2024-07-03
Payer: COMMERCIAL

## 2024-07-03 NOTE — TELEPHONE ENCOUNTER
Patient called to be informed of provider instructions. Patient scheduled and informed of appointment details, patient verbalized understanding.  ----- Message from Bulmaro Aguilera PA-C sent at 7/2/2024  4:31 PM CDT -----  Regarding: RE: MyChart Msg from another provider  Please let pt know that oral surgery team called and needs her to get in and check on blood sugars before they do surgery on her. Can you see if she can do a visit with jase next tues in one of her openings.     I will put in labs for her. Please sched prior to visit.     Please also see if using Dexcom. If not, can she restart before visit with jase  ----- Message -----  From: Ashley Milligan MA  Sent: 7/1/2024   3:05 PM CDT  To: Bulmaro Aguilera PA-C  Subject: MyChart Msg from another provider                Please see the below message from another provider of one of your patients. Thank you ma'am  ----- Message -----  From: Hina Marrero  Sent: 7/1/2024  11:49 AM CDT  To: [facility] Staff    Name of Who is Calling:Dr Srivastava           What is the request in detail:Requesting to speak provider to provider in regards to getting patient medically optimized for surgery.           Can the clinic reply by [facility]:no           What Number to Call Back [facility] not [facility]: [phone]

## 2024-07-05 ENCOUNTER — LAB VISIT (OUTPATIENT)
Dept: LAB | Facility: HOSPITAL | Age: 64
End: 2024-07-05
Attending: PHYSICIAN ASSISTANT
Payer: COMMERCIAL

## 2024-07-05 DIAGNOSIS — E10.65 TYPE 1 DIABETES MELLITUS WITH HYPERGLYCEMIA, WITH LONG-TERM CURRENT USE OF INSULIN: ICD-10-CM

## 2024-07-05 LAB
C PEPTIDE SERPL-MCNC: 1.47 NG/ML (ref 0.78–5.19)
ESTIMATED AVG GLUCOSE: 197 MG/DL (ref 68–131)
GLUCOSE SERPL-MCNC: 137 MG/DL (ref 70–110)
HBA1C MFR BLD: 8.5 % (ref 4–5.6)

## 2024-07-05 PROCEDURE — 83036 HEMOGLOBIN GLYCOSYLATED A1C: CPT | Performed by: PHYSICIAN ASSISTANT

## 2024-07-05 PROCEDURE — 36415 COLL VENOUS BLD VENIPUNCTURE: CPT | Performed by: PHYSICIAN ASSISTANT

## 2024-07-05 PROCEDURE — 82947 ASSAY GLUCOSE BLOOD QUANT: CPT | Performed by: PHYSICIAN ASSISTANT

## 2024-07-05 PROCEDURE — 84681 ASSAY OF C-PEPTIDE: CPT | Performed by: PHYSICIAN ASSISTANT

## 2024-07-07 NOTE — PROGRESS NOTES
Referring Provider:  Rosangela Cat MD     PCP:  Rosangela Cat MD    Reason for referral:     CC:  Multinodular goiter  HPI:  Leesa Le 59 y.o. female    Pt said 90% of her thyroid was removed > 20 years ago bc the thyroid was overactive.  She had thyroid ultrasound done recently and it showed thyroid nodules.  Occasionally patient feels some dysphagia.  No complaints of tremors, heat intolerance, choking sensation, chest pain, shortness of breath, or edema.  From the record there is pathology for fine-needle aspiration of 3 nodules and the result was benign.    History of diabetes.  Patient takes insulin.  Is not clear if patient skips here insulin injections sometimes.  CBgs 190, 200 per pt    No FH of thyroid cancer.      Past Medical History:   Diagnosis Date    Abnormal Pap smear of vagina     Arthritis     Left shoulder    Diabetes mellitus type II     Dr Ayala manages DM     Hyperlipidemia     Hypertension     Thyroid disease     Dr Melissa garcia- goitre+       Past Surgical History:   Procedure Laterality Date    APPENDECTOMY      BREAST BIOPSY Right     about 5 yrs ago    CARDIAC CATHETERIZATION      CATHETERIZATION, HEART, LEFT Left 12/19/2013    Performed by Galen Simeon [facility] at [facility] CATH LAB    COLONOSCOPY N/A 6/23/2017    Performed by Buzz Snyder MD at [facility] ENDO    MOUTH FLOOR MASS EXCISION Bilateral     Lower jaw bones , Bilateral    THYROIDECTOMY, PARTIAL      TUBAL LIGATION      TUMOR EXCISION      mouth       Social History     Socioeconomic History    Marital status:      Spouse name: Not on file    Number of children: 3    Years of education: Not on file    Highest education level: Not on file   Occupational History     Employer: [employer]   Social Needs    Financial resource strain: Not on file    Food insecurity:     Worry: Not on file     Inability: Not on file    Transportation needs:     Medical: Not on file     Non-medical: Not on  file   Tobacco Use    Smoking status: Former Smoker     Packs/day: 1.00     Years: 8.00     Pack years: 8.00     Types: Cigarettes     Last attempt to quit: 2007     Years since quittin.3    Smokeless tobacco: Never Used   Substance and Sexual Activity    Alcohol use: Yes     Alcohol/week: 12.6 oz     Types: 21 Glasses of wine per week    Drug use: No    Sexual activity: Yes     Partners: Male   Lifestyle    Physical activity:     Days per week: Not on file     Minutes per session: Not on file    Stress: Not on file   Relationships    Social connections:     Talks on phone: Not on file     Gets together: Not on file     Attends Zoroastrian service: Not on file     Active member of club or organization: Not on file     Attends meetings of clubs or organizations: Not on file     Relationship status: Not on file   Other Topics Concern    Not on file   Social History Narrative    , 3 kids, BCBS.         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      Lab:    Lab Results   Component Value Date    TSH 1.770 2019    FREET4 0.97 2015       No components found for: AGBA1C  Lab Results   Component Value Date    CHOL 294 (H) 2019    TRIG 165 (H) 2019    HDL 57 2019    CHOLHDL 19.4 (L) 2019    TOTALCHOLEST 5.2 (H) 2019    NONHDLCHOL 237 2019     BMP  Lab Results   Component Value Date      03/28/2019    K 4.3 03/28/2019     03/28/2019    CO2 28 03/28/2019    BUN 16 03/28/2019    CREATININE 0.8 03/28/2019    CALCIUM 9.9 03/28/2019    ANIONGAP 9 03/28/2019    ESTGFRAFRICA >60.0 03/28/2019    EGFRNONAA >60.0 03/28/2019     Lab Results   Component Value Date    CREATRANDUR 111.0 03/28/2019    MICALBCREAT 9.0 03/28/2019     COMPARISON:  05/15/2017    FINDINGS:  The thyroid gland is heterogeneous in echotexture.  The isthmus measures 2.1 mm in thickness.  The right lobe of the thyroid gland measures 5.6 cm in length.  The left lobe of the thyroid gland measures 2.3 cm in length.    The 3 largest nodules within the right lobe of the thyroid gland are measured and measure 1.7 x 1.5 x 1.5 cm within the upper pole, 2.5 x 2.1 x 2.8 cm within the midpole and 2.1 x 1.3 x 2.2 cm within the lower pole.  All of these nodules appear to be predominantly solid with a few cystic spaces.  The nodules are thought to be unchanged in appearance and size.  Please note that the difference in size within the lower pole of the right lobe nodule is thought to be secondary to differences in image acquisition rather than true growth.      Impression       Essentially stable exam     Pathology from 2015:  1. Right upper thyroid, FNA:  [facility] Thyroid Cytology Category: Benign  2. Right mid thyroid, FNA:  [facility] Thyroid Cytology Category: Benign  3. Right lower thyroid, FNA:  [facility] Thyroid Cytology Category: Benign  [facility]  Diagnosed by: Thompson Hodges M.D.  (Electronically Signed: 2015-09-09 16:21:21)  Microscopic Examination  1-3. Benign follicular cells, macrophages and colloid.    A/P:  Multiple thyroid nodules  Clinically euthyroid  -     T4, free; Future; Expected date: 04/29/2019  -     TSH; Future; Expected date: 04/29/2019  -     T3; Future; Expected date: 04/29/2019  -     Ambulatory referral to ENT  -     US FNA Thyroid, 1st Lesion; Future; Expected date: 05/29/2019    History of  thyroid surgery    Diabetes  Elevated A1c  Patient is advised to make appointment follow-up with her primary care physician, Dr. Emory Ag in 6 months provided that pathology of fine-needle aspiration of the thyroid will be benign.  Pt understands the plan and instructions.             full weight-bearing

## 2024-07-08 ENCOUNTER — OFFICE VISIT (OUTPATIENT)
Dept: DIABETES | Facility: CLINIC | Age: 64
End: 2024-07-08
Payer: COMMERCIAL

## 2024-07-08 ENCOUNTER — TELEPHONE (OUTPATIENT)
Dept: DIABETES | Facility: CLINIC | Age: 64
End: 2024-07-08

## 2024-07-08 VITALS
WEIGHT: 108.44 LBS | DIASTOLIC BLOOD PRESSURE: 77 MMHG | SYSTOLIC BLOOD PRESSURE: 157 MMHG | BODY MASS INDEX: 21.18 KG/M2

## 2024-07-08 DIAGNOSIS — Z79.4 TYPE 2 DIABETES MELLITUS WITH HYPERGLYCEMIA, WITH LONG-TERM CURRENT USE OF INSULIN: Primary | ICD-10-CM

## 2024-07-08 DIAGNOSIS — T46.6X5A STATIN MYOPATHY: ICD-10-CM

## 2024-07-08 DIAGNOSIS — E11.59 HYPERTENSION ASSOCIATED WITH DIABETES: ICD-10-CM

## 2024-07-08 DIAGNOSIS — G72.0 STATIN MYOPATHY: ICD-10-CM

## 2024-07-08 DIAGNOSIS — I15.2 HYPERTENSION ASSOCIATED WITH DIABETES: ICD-10-CM

## 2024-07-08 DIAGNOSIS — E04.2 MULTIPLE THYROID NODULES: ICD-10-CM

## 2024-07-08 DIAGNOSIS — Z98.890 HISTORY OF THYROID SURGERY: ICD-10-CM

## 2024-07-08 DIAGNOSIS — E11.65 TYPE 2 DIABETES MELLITUS WITH HYPERGLYCEMIA, WITH LONG-TERM CURRENT USE OF INSULIN: Primary | ICD-10-CM

## 2024-07-08 DIAGNOSIS — G47.33 OSA (OBSTRUCTIVE SLEEP APNEA): ICD-10-CM

## 2024-07-08 DIAGNOSIS — E78.5 HYPERLIPIDEMIA, UNSPECIFIED HYPERLIPIDEMIA TYPE: ICD-10-CM

## 2024-07-08 LAB — GLUCOSE SERPL-MCNC: 171 MG/DL (ref 70–110)

## 2024-07-08 PROCEDURE — 82962 GLUCOSE BLOOD TEST: CPT | Mod: S$GLB,,, | Performed by: PHYSICIAN ASSISTANT

## 2024-07-08 PROCEDURE — 3077F SYST BP >= 140 MM HG: CPT | Mod: CPTII,S$GLB,, | Performed by: PHYSICIAN ASSISTANT

## 2024-07-08 PROCEDURE — 99214 OFFICE O/P EST MOD 30 MIN: CPT | Mod: S$GLB,,, | Performed by: PHYSICIAN ASSISTANT

## 2024-07-08 PROCEDURE — 3052F HG A1C>EQUAL 8.0%<EQUAL 9.0%: CPT | Mod: CPTII,S$GLB,, | Performed by: PHYSICIAN ASSISTANT

## 2024-07-08 PROCEDURE — 3078F DIAST BP <80 MM HG: CPT | Mod: CPTII,S$GLB,, | Performed by: PHYSICIAN ASSISTANT

## 2024-07-08 PROCEDURE — G2211 COMPLEX E/M VISIT ADD ON: HCPCS | Mod: S$GLB,,, | Performed by: PHYSICIAN ASSISTANT

## 2024-07-08 PROCEDURE — 3008F BODY MASS INDEX DOCD: CPT | Mod: CPTII,S$GLB,, | Performed by: PHYSICIAN ASSISTANT

## 2024-07-08 PROCEDURE — 99999 PR PBB SHADOW E&M-EST. PATIENT-LVL II: CPT | Mod: PBBFAC,,, | Performed by: PHYSICIAN ASSISTANT

## 2024-07-08 RX ORDER — SEMAGLUTIDE 1.34 MG/ML
0.5 INJECTION, SOLUTION SUBCUTANEOUS
Qty: 1.5 ML | Refills: 11 | Status: SHIPPED | OUTPATIENT
Start: 2024-07-08 | End: 2024-07-08 | Stop reason: SDUPTHER

## 2024-07-08 RX ORDER — BLOOD-GLUCOSE SENSOR
1 EACH MISCELLANEOUS
Qty: 9 EACH | Refills: 3 | Status: SHIPPED | OUTPATIENT
Start: 2024-07-08 | End: 2025-07-08

## 2024-07-08 RX ORDER — SEMAGLUTIDE 1.34 MG/ML
0.5 INJECTION, SOLUTION SUBCUTANEOUS
Qty: 1.5 ML | Refills: 11 | Status: SHIPPED | OUTPATIENT
Start: 2024-07-08 | End: 2024-07-10

## 2024-07-08 NOTE — PROGRESS NOTES
PCP: Rosangela Cat MD    Subjective:     Chief Complaint: Diabetes - Established Patient    HISTORY OF PRESENT ILLNESS: 64 y.o.   female presenting for diabetes management visit.   The patient's last visit with me was on 11/29/2023.   Patient has had diabetes with past history of low C peptide since 10 years ago.   Pertinent to decision making is the following comorbidities: HTN, HLD and Multinodular goiter s/p thyroid surgery, GENTRY on CPAP, Statin Intolerance, and Medical Noncompliance  Patient has the following Diabetes complications: without complications  She  has attended diabetes education in the past.      Patient's most recent A1c of 8.5% was completed 3 days ago.   Patient states since Her last A1c Her blood glucose levels have been high  after meals.   Patient monitors blood glucose 4 times per day and Continuously with personal CGM Dexcom.   Patient blood glucose monitoring device will not be uploaded into Media Section today. Patient admits stopped Dexcom during depression episode.   BG in clinic this am is 171 which is postprandial. 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 Will require 3 surgeries with grafting.   Patient endorses the following diabetes related symptoms:  Depression and Anxiety . Previously seeing therapy and psychiatry. Has history of medication side effects with depression meds including agitation and SI. Did recently try group therapy but did not like it. Plans to try some individual therapy.   Patient is due today for the following diabetes-related health maintenance standards: Foot Exam , Eye Exam, Lipid panel, and RSV Vaccine .   She denies recent hospital admissions or emergency room visits. Patient denies known DKA admissions.  She denies having hypoglycemia.  Patient's concerns today include glycemic control. Of note, patient reports avoiding carb  intake - mostly eating protein and nonstarchy vegetables.   Of note, patient has an elevated BP of 157/77 today upon presentation to clinic. Patient is resting comfortably in the room. Patient is not currently taking BP medications. Patient denies CP, SOB, Palpitations, Abdominal Pain, Nausea/Vomiting, Headache, and other concerning symptoms. Patient states her BP is only elevated due to white coat syndrome but has not been checking at home.  Patient medication regimen is as below.     CURRENT DM MEDICATIONS:   Novolog every 3 hours as needed  - NOT UTILIZING                 To Correct Blood Sugar (TO add to meal time dose OR to be used as correction after 3 hours if not eating:    - 180: +2 units   - 200: +3 units    - 220: +4 units   - 240: +5 units   - 260: +6 units   - 280: +7 units   - 300: +8 units   - 320: +9 units   - 340: +10 units   - 380: +11 units   - 400: +12 units  BG +400: +13 units - MAX     Patient has failed the following Diabetes medications:   Glucovance   Basaglar       Labs Reviewed.       Lab Results   Component Value Date    CPEPTIDE 1.47 07/05/2024     Lab Results   Component Value Date    GLUTAMICACID 0.00 04/10/2023          //  Weight: 49.2 kg (108 lb 7.5 oz), Body mass index is 21.18 kg/m².  Her blood sugar in clinic today is:    Lab Results   Component Value Date    POCGLU 234 (A) 10/16/2023       Review of Systems   Constitutional:  Negative for activity change, appetite change, chills and fever.   HENT:  Negative for dental problem, mouth sores, nosebleeds, sore throat and trouble swallowing.    Eyes:  Negative for pain and discharge.   Respiratory:  Negative for shortness of breath, wheezing and stridor.    Cardiovascular:  Negative for chest pain, palpitations and leg swelling.   Gastrointestinal:  Negative for abdominal pain, diarrhea, nausea and vomiting.   Endocrine: Negative for polydipsia, polyphagia and  polyuria.   Genitourinary:  Negative for dysuria, frequency and urgency.   Musculoskeletal:  Negative for joint swelling and myalgias.   Skin:  Negative for rash and wound.   Neurological:  Negative for dizziness, syncope, weakness and headaches.   Psychiatric/Behavioral:  Negative for behavioral problems and dysphoric mood.          Diabetes Management Status  Statin: Not taking  ACE/ARB: Taking    Screening or Prevention Patient's value Goal Complete/Controlled?   HgA1C Testing and Control   Lab Results   Component Value Date    HGBA1C 8.5 (H) 2024      Annually/Less than 8% No   Lipid profile : 04/10/2023 Annually Yes   LDL control Lab Results   Component Value Date    LDLCALC 177.0 (H) 04/10/2023    Annually/Less than 100 mg/dl  No   Nephropathy screening Lab Results   Component Value Date    MICALBCREAT 47.8 (H) 10/16/2023     Lab Results   Component Value Date    PROTEINUA Negative 2022    Annually Yes   Blood pressure BP Readings from Last 1 Encounters:   24 (!) 157/77    Less than 140/90 Yes   Dilated retinal exam : 2017 Annually No    Foot exam   Most Recent Foot Exam Date: Not Found Annually No     Social History     Socioeconomic History    Marital status:     Number of children: 3   Occupational History     Employer: [employer]   Tobacco Use    Smoking status: Former     Current packs/day: 0.00     Average packs/day: 1 pack/day for 28.0 years (28.0 ttl pk-yrs)     Types: Cigarettes     Start date:      Quit date: 2007     Years since quittin.5    Smokeless tobacco: Never    Tobacco comments:     smoked off and on from [id number] about 15 years total years smoking.   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     Social Determinants of Health      Financial Resource Strain: Low Risk  (5/22/2024)    Overall Financial Resource Strain (CARDIA)     Difficulty of Paying Living Expenses: Not hard at all   Food Insecurity: No Food Insecurity (5/22/2024)    Hunger Vital Sign     Worried About Running Out of Food in the Last Year: Never true     Ran Out of Food in the Last Year: Never true   Transportation Needs: No Transportation Needs (9/23/2022)    PRAPARE - Transportation     Lack of Transportation (Medical): No     Lack of Transportation (Non-Medical): No   Physical Activity: Unknown (5/22/2024)    Exercise Vital Sign     Days of Exercise per Week: 0 days   Stress: No Stress Concern Present (5/22/2024)    Citizen of [place] of Occupational Health - Occupational Stress Questionnaire     Feeling of Stress : Not at all   Housing Stability: Low Risk  (9/23/2022)    Housing Stability Vital Sign     Unable to Pay for Housing in the Last Year: No     Number of Places Lived in the Last Year: 1     Unstable Housing in the Last Year: No     Past Medical History:   Diagnosis Date    Abnormal Pap smear of vagina     Arthritis     Left shoulder    Diabetes mellitus type II     Dr Ayala manages DM     Hyperlipidemia     Hypertension     Thyroid disease     Dr Melissa knappneaux- goitre+       Objective:        Physical Exam  Constitutional:       General: She is not in acute distress.     Appearance: She is well-developed. She is not diaphoretic.   HENT:      Head: Normocephalic and atraumatic.      Right Ear: External ear normal.      Left Ear: External ear normal.      Nose: Nose normal.   Eyes:      General:         Right eye: No discharge.         Left eye: No discharge.      Pupils: Pupils are equal, round, and reactive to light.   Cardiovascular:      Rate and Rhythm: Normal rate and regular rhythm.      Pulses:           Dorsalis pedis pulses are 2+ on the right side and 2+ on the left side.        Posterior tibial pulses are 2+ on the right side and 2+ on the left  side.      Heart sounds: Normal heart sounds.   Pulmonary:      Effort: Pulmonary effort is normal.      Breath sounds: Normal breath sounds.   Abdominal:      Palpations: Abdomen is soft.   Musculoskeletal:         General: Normal range of motion.      Cervical back: Normal range of motion and neck supple.      Right foot: Normal range of motion. No deformity.      Left foot: Normal range of motion. No deformity.   Feet:      Right foot:      Protective Sensation: 6 sites tested.  6 sites sensed.      Skin integrity: No ulcer, blister, skin breakdown, erythema, warmth, callus or dry skin.      Left foot:      Protective Sensation: 6 sites tested.  6 sites sensed.      Skin integrity: No ulcer, blister, skin breakdown, erythema, warmth, callus or dry skin.   Skin:     General: Skin is warm and dry.      Capillary Refill: Capillary refill takes less than 2 seconds.   Neurological:      Mental Status: She is alert and oriented to person, place, and time. Mental status is at baseline.   Psychiatric:         Mood and Affect: Mood normal.         Behavior: Behavior normal.         Thought Content: Thought content normal.         Judgment: Judgment normal.           Assessment / Plan:     Type 2 diabetes mellitus with hyperglycemia, with long-term current use of insulin  -     POCT Glucose, Hand-Held Device  -     Discontinue: semaglutide (OZEMPIC) 0.25 mg or 0.5 mg(2 mg/1.5 mL) pen injector; Inject 0.5 mg into the skin every 7 days.  Dispense: 1.5 mL; Refill: 11  -     blood-glucose sensor (DEXCOM G7 SENSOR) Camilla; 1 each by Misc.(Non-Drug; Combo Route) route every 10 days.  Dispense: 9 each; Refill: 3  -     semaglutide (OZEMPIC) 0.25 mg or 0.5 mg(2 mg/1.5 mL) pen injector; Inject 0.5 mg into the skin every 7 days.  Dispense: 1.5 mL; Refill: 11    Hypertension associated with diabetes    Hyperlipidemia, unspecified hyperlipidemia type    History of thyroid surgery    Multiple thyroid nodules    Statin myopathy    GENTRY  (obstructive sleep apnea)      Additional Plan Details:    - POCT Glucose  - Encouraged continuation of lifestyle changes including regular exercise and limiting carbohydrates to 30-45 grams per meal threes times daily and 15 grams per snack with a limit of two daily.   - Encouraged continued monitoring of blood glucose with maintenance of 4 times daily and Continuously with personal CGM Dexcom.  RESTART DEX TODAY.   - Current DM Medication Regimen: Change Novolog correction dosing per scale below. Focus on compliance of dosing. Start Ozempic 0.5 mg weekly since endogenous insulin supply has recovered.   - Depression: contributing to patient having issues with compliance of diabetes; recommended individual therapy and follow up with psychiatry team  - Needs follow up with PCP; recommended bringing home BP logs to follow up  - Health Maintenance standards addressed today: Foot Exam - completed today and documented in physical exam with feedback to patient about proper diabetic foot care and findings, Eye Exam - will be completed outside of [facility] and patient will schedule, Lipid panel to be scheduled today, and RSV vaccine  - Nursing Visit: Patient is age 79 or younger with an A1c of 7.5 or greater and will not need nursing visit at this time .   - Follow up in 10 days for Dex review.     CURRENT DM MEDICATIONS:   Novolog every 3 hours as needed   Ozempic 0.5 mg weekly                 To Correct Blood Sugar (TO add to meal time dose OR to be used as correction after 3 hours if not eating:    - 180: +2 units   - 200: +3 units    - 220: +4 units   - 240: +5 units   - 260: +6 units   - 280: +7 units   - 300: +8 units   - 320: +9 units   - 340: +10 units   - 380: +11 units   - 400: +12 units  BG +400: +13 units - MAX Blakeney McKnight, PA-C [facility]        A total of 30 minutes was spent in face to face time, of which over 50 % was  spent in counseling patient on disease process, complications, treatment, and side effects of medications.

## 2024-07-08 NOTE — Clinical Note
Dr. Cat, she needs an appt to see you at end of month. Oral surgery at [facility] contacted me about getting her in for aggressive BG management. Has benign but aggressive jaw tumor and will have to have surg removed and grafting done. Off all meds and Dex so checking on her in 10 days. BP was 157/77. Claimed she has white coat syndrome but I told her needed to bring bp logs to see you.   Thank you!   Bulmaro Aguilera PA-C, BC-ADM [facility]

## 2024-07-08 NOTE — PATIENT INSTRUCTIONS
CURRENT DM MEDICATIONS:   Novolog every 3 hours as needed   Ozempic 0.5 mg weekly                 To Correct Blood Sugar (TO add to meal time dose OR to be used as correction after 3 hours if not eating:    - 180: +2 units   - 200: +3 units    - 220: +4 units   - 240: +5 units   - 260: +6 units   - 280: +7 units   - 300: +8 units   - 320: +9 units   - 340: +10 units   - 380: +11 units   - 400: +12 units  BG +400: +13 units - MAX

## 2024-07-09 NOTE — TELEPHONE ENCOUNTER
Called pharmacy to check if second script for ozempic was cover with new DX Code. Medication still not covered. Pharmacy will fax PA

## 2024-07-10 ENCOUNTER — PATIENT MESSAGE (OUTPATIENT)
Dept: DIABETES | Facility: CLINIC | Age: 64
End: 2024-07-10
Payer: COMMERCIAL

## 2024-07-10 PROBLEM — Z79.4 TYPE 2 DIABETES MELLITUS WITH HYPERGLYCEMIA, WITH LONG-TERM CURRENT USE OF INSULIN: Status: ACTIVE | Noted: 2024-07-10

## 2024-07-10 PROBLEM — E11.65 TYPE 2 DIABETES MELLITUS WITH HYPERGLYCEMIA, WITH LONG-TERM CURRENT USE OF INSULIN: Status: ACTIVE | Noted: 2024-07-10

## 2024-07-10 RX ORDER — SEMAGLUTIDE 1.34 MG/ML
0.5 INJECTION, SOLUTION SUBCUTANEOUS
Qty: 1.5 ML | Refills: 11 | Status: SHIPPED | OUTPATIENT
Start: 2024-07-10 | End: 2025-07-10

## 2024-07-10 NOTE — TELEPHONE ENCOUNTER
Call placed to [facility] [place] regarding prior auth for semaglutide (OZEMPIC) 0.25 mg or 0.5 mg(2 mg/1.5 mL) pen injector.    Spoke with  Gisel, verbally renewed prior auth with corrected ICD-10 code. PA approved Case ID [id number] 07/10/24 to 07/10/2025 approval to be faxed

## 2024-07-17 ENCOUNTER — OFFICE VISIT (OUTPATIENT)
Dept: DIABETES | Facility: CLINIC | Age: 64
End: 2024-07-17
Payer: COMMERCIAL

## 2024-07-17 ENCOUNTER — PATIENT MESSAGE (OUTPATIENT)
Dept: DIABETES | Facility: CLINIC | Age: 64
End: 2024-07-17

## 2024-07-17 DIAGNOSIS — E11.59 HYPERTENSION ASSOCIATED WITH DIABETES: ICD-10-CM

## 2024-07-17 DIAGNOSIS — I15.2 HYPERTENSION ASSOCIATED WITH DIABETES: ICD-10-CM

## 2024-07-17 DIAGNOSIS — Z79.4 TYPE 2 DIABETES MELLITUS WITH HYPERGLYCEMIA, WITH LONG-TERM CURRENT USE OF INSULIN: Primary | ICD-10-CM

## 2024-07-17 DIAGNOSIS — Z98.890 HISTORY OF THYROID SURGERY: ICD-10-CM

## 2024-07-17 DIAGNOSIS — G47.33 OSA (OBSTRUCTIVE SLEEP APNEA): ICD-10-CM

## 2024-07-17 DIAGNOSIS — E11.65 TYPE 2 DIABETES MELLITUS WITH HYPERGLYCEMIA, WITH LONG-TERM CURRENT USE OF INSULIN: Primary | ICD-10-CM

## 2024-07-17 DIAGNOSIS — E78.5 HYPERLIPIDEMIA, UNSPECIFIED HYPERLIPIDEMIA TYPE: ICD-10-CM

## 2024-07-17 DIAGNOSIS — G72.0 STATIN MYOPATHY: ICD-10-CM

## 2024-07-17 DIAGNOSIS — T46.6X5A STATIN MYOPATHY: ICD-10-CM

## 2024-07-17 DIAGNOSIS — R11.0 NAUSEA: ICD-10-CM

## 2024-07-17 DIAGNOSIS — E04.2 MULTIPLE THYROID NODULES: ICD-10-CM

## 2024-07-17 PROCEDURE — 99214 OFFICE O/P EST MOD 30 MIN: CPT | Mod: 95,,, | Performed by: PHYSICIAN ASSISTANT

## 2024-07-17 PROCEDURE — 3052F HG A1C>EQUAL 8.0%<EQUAL 9.0%: CPT | Mod: CPTII,95,, | Performed by: PHYSICIAN ASSISTANT

## 2024-07-17 PROCEDURE — 95251 CONT GLUC MNTR ANALYSIS I&R: CPT | Mod: NDTC,,, | Performed by: PHYSICIAN ASSISTANT

## 2024-07-17 RX ORDER — ONDANSETRON 4 MG/1
8 TABLET, FILM COATED ORAL 2 TIMES DAILY
Qty: 40 TABLET | Refills: 0 | Status: SHIPPED | OUTPATIENT
Start: 2024-07-17 | End: 2024-07-17 | Stop reason: SDUPTHER

## 2024-07-17 RX ORDER — ONDANSETRON 4 MG/1
8 TABLET, FILM COATED ORAL 2 TIMES DAILY
Qty: 40 TABLET | Refills: 1 | Status: SHIPPED | OUTPATIENT
Start: 2024-07-17

## 2024-07-17 NOTE — PROGRESS NOTES
PCP: Rosangela Cat MD    Subjective:     Chief Complaint: Diabetes - Established Patient    Established Patient - Audio Only Telehealth Visit     The patient location is: Home  The chief complaint leading to consultation is: Diabetes follow up  Visit type: Virtual visit with audio only (telephone)  Total Time Spent with Patient: 8 minutes     The reason for the audio only service rather than synchronous audio and video virtual visit was related to technical difficulties or patient preference/necessity.     Each patient to whom I provide medical services by telemedicine is:  (1) informed of the relationship between the physician and patient and the respective role of any other health care provider with respect to management of the patient; and (2) notified that they may decline to receive medical services by telemedicine and may withdraw from such care at any time. Patient verbally consented to receive this service via voice-only telephone call.    This service was not originating from a related E/M service provided within the previous 7 days nor will  to an E/M service or procedure within the next 24 hours or my soonest available appointment.  Prevailing standard of care was able to be met in this audio-only visit.       HISTORY OF PRESENT ILLNESS: 64 y.o.   female presenting for diabetes management visit.   The patient's last visit with me was on 7/8/2024.  Patient has had diabetes with past history of low C peptide since 10 years ago.   Pertinent to decision making is the following comorbidities: HTN, HLD and Multinodular goiter s/p thyroid surgery, GENTRY on CPAP, Statin Intolerance, and Medical Noncompliance  Patient has the following Diabetes complications: without complications  She  has attended diabetes education in the past.      Patient's most recent A1c of 8.5% was completed 2 weeks ago.   Patient states since Her last A1c Her blood glucose levels have been high  after meals.    Patient monitors blood glucose 4 times per day and Continuously with personal CGM Dexcom.   Patient blood glucose monitoring device will be uploaded into [employer] today.          Patient reports shows big improvement in glycemic control with Ozempic start however, pt is reporting nausea and mild dehydration with first dose.     Patient endorses the following diabetes related symptoms:  Depression and Anxiety . Previously seeing therapy and psychiatry. Has history of medication side effects with depression meds including agitation and SI. Did recently try group therapy but did not like it. Plans to try some individual therapy.   Patient is due today for the following diabetes-related health maintenance standards: Eye Exam, Lipid panel, and RSV Vaccine .   She denies recent hospital admissions or emergency room visits. Patient denies known DKA admissions.  She denies having hypoglycemia.  Patient's concerns today include glycemic control. Of note, patient reports avoiding carb intake - mostly eating protein and nonstarchy vegetables.   Patient medication regimen is as below.     CURRENT DM MEDICATIONS:   Novolog every 3 hours as needed   Ozempic 0.5 mg weekly                 To Correct Blood Sugar (TO add to meal time dose OR to be used as correction after 3 hours if not eating:    - 180: +2 units   - 200: +3 units    - 220: +4 units   - 240: +5 units   - 260: +6 units   - 280: +7 units   - 300: +8 units   - 320: +9 units   - 340: +10 units   - 380: +11 units   - 400: +12 units  BG +400: +13 units - MAX    Patient has failed the following Diabetes medications:   Glucovance   Basaglar       Labs Reviewed.       Lab Results   Component Value Date    CPEPTIDE 1.47 07/05/2024     Lab Results   Component Value Date    GLUTAMICACID 0.00 04/10/2023          //   , There is no height or weight on file to calculate BMI.  Her blood sugar in clinic today  is:    Lab Results   Component Value Date    POCGLU 171 (A) 07/08/2024       Review of Systems   Constitutional:  Negative for activity change, appetite change, chills and fever.   HENT:  Negative for dental problem, mouth sores, nosebleeds, sore throat and trouble swallowing.    Eyes:  Negative for pain and discharge.   Respiratory:  Negative for shortness of breath, wheezing and stridor.    Cardiovascular:  Negative for chest pain, palpitations and leg swelling.   Gastrointestinal:  Negative for abdominal pain, diarrhea, nausea and vomiting.   Endocrine: Negative for polydipsia, polyphagia and polyuria.   Genitourinary:  Negative for dysuria, frequency and urgency.   Musculoskeletal:  Negative for joint swelling and myalgias.   Skin:  Negative for rash and wound.   Neurological:  Negative for dizziness, syncope, weakness and headaches.   Psychiatric/Behavioral:  Negative for behavioral problems and dysphoric mood.          Diabetes Management Status  Statin: Not taking  ACE/ARB: Taking    Screening or Prevention Patient's value Goal Complete/Controlled?   HgA1C Testing and Control   Lab Results   Component Value Date    HGBA1C 8.5 (H) 07/05/2024      Annually/Less than 8% No   Lipid profile : 04/10/2023 Annually Yes   LDL control Lab Results   Component Value Date    LDLCALC 177.0 (H) 04/10/2023    Annually/Less than 100 mg/dl  No   Nephropathy screening Lab Results   Component Value Date    MICALBCREAT 47.8 (H) 10/16/2023     Lab Results   Component Value Date    PROTEINUA Negative 09/27/2022    Annually Yes   Blood pressure BP Readings from Last 1 Encounters:   07/08/24 (!) 157/77    Less than 140/90 Yes   Dilated retinal exam : 01/16/2017 Annually No    Foot exam   : 07/08/2024 Annually No     Social History     Socioeconomic History    Marital status:     Number of children: 3   Occupational History     Employer: [employer]   Tobacco Use    Smoking status: Former     Current packs/day:  0.00     Average packs/day: 1 pack/day for 28.0 years (28.0 ttl pk-yrs)     Types: Cigarettes     Start date:      Quit date: 2007     Years since quittin.5    Smokeless tobacco: Never    Tobacco comments:     smoked off and on from 5922-6790 about 15 years total years smoking.   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     Social Determinants of Health     Financial Resource Strain: Low Risk  (2024)    Overall Financial Resource Strain (CARDIA)     Difficulty of Paying Living Expenses: Not hard at all   Food Insecurity: No Food Insecurity (2024)    Hunger Vital Sign     Worried About Running Out of Food in the Last Year: Never true     Ran Out of Food in the Last Year: Never true   Transportation Needs: No Transportation Needs (2022)    PRAPARE - Transportation     Lack of Transportation (Medical): No     Lack of Transportation (Non-Medical): No   Physical Activity: Unknown (2024)    Exercise Vital Sign     Days of Exercise per Week: 0 days   Stress: No Stress Concern Present (2024)    [employer] [place] [employer] - Occupational Stress Questionnaire     Feeling of Stress : Not at all   Housing Stability: Low Risk  (2022)    Housing Stability Vital Sign     Unable to Pay for Housing in the Last Year: No     Number of Places Lived in the Last Year: 1     Unstable Housing in the Last Year: No     Past Medical History:   Diagnosis Date    Abnormal Pap smear of vagina     Arthritis     Left shoulder    Diabetes mellitus type II     Dr Ayala manages DM     Hyperlipidemia     Hypertension     Thyroid disease     Dr Melissa garcia- goitre+       Objective:        Physical Exam  Neurological:      Mental Status: She is alert and oriented to person, place, and  time. Mental status is at baseline.   Psychiatric:         Mood and Affect: Mood normal.         Behavior: Behavior normal.         Thought Content: Thought content normal.         Judgment: Judgment normal.           Assessment / Plan:     Type 2 diabetes mellitus with hyperglycemia, with long-term current use of insulin  -     Discontinue: ondansetron (ZOFRAN) 4 MG tablet; Take 2 tablets (8 mg total) by mouth 2 (two) times daily.  Dispense: 40 tablet; Refill: 0  -     ondansetron (ZOFRAN) 4 MG tablet; Take 2 tablets (8 mg total) by mouth 2 (two) times daily.  Dispense: 40 tablet; Refill: 1    Hypertension associated with diabetes    Hyperlipidemia, unspecified hyperlipidemia type    History of thyroid surgery    Multiple thyroid nodules    Statin myopathy    GENTRY (obstructive sleep apnea)    Nausea  -     Discontinue: ondansetron (ZOFRAN) 4 MG tablet; Take 2 tablets (8 mg total) by mouth 2 (two) times daily.  Dispense: 40 tablet; Refill: 0  -     ondansetron (ZOFRAN) 4 MG tablet; Take 2 tablets (8 mg total) by mouth 2 (two) times daily.  Dispense: 40 tablet; Refill: 1        Additional Plan Details:    - POCT Glucose  - Encouraged continuation of lifestyle changes including regular exercise and limiting carbohydrates to 30-45 grams per meal threes times daily and 15 grams per snack with a limit of two daily.   - Encouraged continued monitoring of blood glucose with maintenance of 4 times daily and Continuously with personal CGM Dexcom.    - Current DM Medication Regimen: Change Novolog correction dosing per scale below. Focus on compliance of dosing. Change Ozempic 0.25 mg weekly.  - Zofran PRN nausea; focus on taking and rehydrating  - Health Maintenance standards addressed today: Eye Exam - will be completed outside of [facility] and patient will schedule, Lipid panel to be scheduled today, and RSV vaccine  - Nursing Visit: Patient is age 79 or younger with an A1c of 7.5 or greater and will not need nursing visit at  this time .   - Follow up in 2 wks.     CURRENT DM MEDICATIONS:   Novolog every 3 hours as needed   Ozempic 0.25 mg weekly                 To Correct Blood Sugar (TO add to meal time dose OR to be used as correction after 3 hours if not eating:    - 180: +2 units   - 200: +3 units    - 220: +4 units   - 240: +5 units   - 260: +6 units   - 280: +7 units   - 300: +8 units   - 320: +9 units   - 340: +10 units   - 380: +11 units   - 400: +12 units  BG +400: +13 units - MAX    Blakeney McKnight, PA-C [facility]

## 2024-08-05 ENCOUNTER — OFFICE VISIT (OUTPATIENT)
Dept: INTERNAL MEDICINE | Facility: CLINIC | Age: 64
End: 2024-08-05
Payer: COMMERCIAL

## 2024-08-05 ENCOUNTER — OFFICE VISIT (OUTPATIENT)
Dept: DIABETES | Facility: CLINIC | Age: 64
End: 2024-08-05
Payer: COMMERCIAL

## 2024-08-05 VITALS
HEART RATE: 86 BPM | TEMPERATURE: 98 F | BODY MASS INDEX: 20.74 KG/M2 | OXYGEN SATURATION: 98 % | DIASTOLIC BLOOD PRESSURE: 89 MMHG | HEIGHT: 60 IN | WEIGHT: 105.63 LBS | SYSTOLIC BLOOD PRESSURE: 132 MMHG

## 2024-08-05 DIAGNOSIS — E04.2 MULTIPLE THYROID NODULES: ICD-10-CM

## 2024-08-05 DIAGNOSIS — Z79.4 TYPE 2 DIABETES MELLITUS WITH HYPERGLYCEMIA, WITH LONG-TERM CURRENT USE OF INSULIN: ICD-10-CM

## 2024-08-05 DIAGNOSIS — G72.0 STATIN MYOPATHY: ICD-10-CM

## 2024-08-05 DIAGNOSIS — Z79.4 TYPE 2 DIABETES MELLITUS WITH HYPERGLYCEMIA, WITH LONG-TERM CURRENT USE OF INSULIN: Primary | ICD-10-CM

## 2024-08-05 DIAGNOSIS — E11.65 TYPE 2 DIABETES MELLITUS WITH HYPERGLYCEMIA, WITH LONG-TERM CURRENT USE OF INSULIN: Primary | ICD-10-CM

## 2024-08-05 DIAGNOSIS — F33.9 MAJOR DEPRESSION, RECURRENT, CHRONIC: ICD-10-CM

## 2024-08-05 DIAGNOSIS — E78.5 HYPERLIPIDEMIA DUE TO TYPE 1 DIABETES MELLITUS: ICD-10-CM

## 2024-08-05 DIAGNOSIS — E11.65 TYPE 2 DIABETES MELLITUS WITH HYPERGLYCEMIA, WITH LONG-TERM CURRENT USE OF INSULIN: ICD-10-CM

## 2024-08-05 DIAGNOSIS — Z98.890 HISTORY OF THYROID SURGERY: ICD-10-CM

## 2024-08-05 DIAGNOSIS — E10.69 HYPERLIPIDEMIA DUE TO TYPE 1 DIABETES MELLITUS: ICD-10-CM

## 2024-08-05 DIAGNOSIS — E11.59 HYPERTENSION ASSOCIATED WITH DIABETES: ICD-10-CM

## 2024-08-05 DIAGNOSIS — F43.10 PTSD (POST-TRAUMATIC STRESS DISORDER): ICD-10-CM

## 2024-08-05 DIAGNOSIS — Z78.9 STATIN INTOLERANCE: ICD-10-CM

## 2024-08-05 DIAGNOSIS — I15.2 HYPERTENSION ASSOCIATED WITH TYPE 1 DIABETES MELLITUS: ICD-10-CM

## 2024-08-05 DIAGNOSIS — G47.33 OSA (OBSTRUCTIVE SLEEP APNEA): ICD-10-CM

## 2024-08-05 DIAGNOSIS — E10.59 HYPERTENSION ASSOCIATED WITH TYPE 1 DIABETES MELLITUS: ICD-10-CM

## 2024-08-05 DIAGNOSIS — I15.2 HYPERTENSION ASSOCIATED WITH DIABETES: ICD-10-CM

## 2024-08-05 DIAGNOSIS — G47.33 OSA ON CPAP: ICD-10-CM

## 2024-08-05 DIAGNOSIS — F41.8 ANXIETY ASSOCIATED WITH DEPRESSION: ICD-10-CM

## 2024-08-05 DIAGNOSIS — Z01.818 PRE-OPERATIVE CLEARANCE: ICD-10-CM

## 2024-08-05 DIAGNOSIS — D16.5 AMELOBLASTOMA OF JAW: Primary | ICD-10-CM

## 2024-08-05 DIAGNOSIS — E78.5 HYPERLIPIDEMIA, UNSPECIFIED HYPERLIPIDEMIA TYPE: ICD-10-CM

## 2024-08-05 DIAGNOSIS — T46.6X5A STATIN MYOPATHY: ICD-10-CM

## 2024-08-05 PROBLEM — R94.31 ABNORMAL ECG: Status: RESOLVED | Noted: 2017-01-09 | Resolved: 2024-08-05

## 2024-08-05 LAB
ALBUMIN SERPL BCP-MCNC: 3.9 G/DL (ref 3.5–5.2)
ALP SERPL-CCNC: 70 U/L (ref 55–135)
ALT SERPL W/O P-5'-P-CCNC: 14 U/L (ref 10–44)
ANION GAP SERPL CALC-SCNC: 13 MMOL/L (ref 8–16)
APTT PPP: 29.1 SEC (ref 21–32)
AST SERPL-CCNC: 16 U/L (ref 10–40)
BASOPHILS # BLD AUTO: 0.04 K/UL (ref 0–0.2)
BASOPHILS NFR BLD: 0.7 % (ref 0–1.9)
BILIRUB SERPL-MCNC: 0.4 MG/DL (ref 0.1–1)
BILIRUB UR QL STRIP: NEGATIVE
BUN SERPL-MCNC: 16 MG/DL (ref 8–23)
CALCIUM SERPL-MCNC: 9.7 MG/DL (ref 8.7–10.5)
CHLORIDE SERPL-SCNC: 105 MMOL/L (ref 95–110)
CHOLEST SERPL-MCNC: 250 MG/DL (ref 120–199)
CHOLEST/HDLC SERPL: 4.2 {RATIO} (ref 2–5)
CLARITY UR: CLEAR
CO2 SERPL-SCNC: 21 MMOL/L (ref 23–29)
COLOR UR: YELLOW
CREAT SERPL-MCNC: 1.1 MG/DL (ref 0.5–1.4)
DIFFERENTIAL METHOD BLD: ABNORMAL
EOSINOPHIL # BLD AUTO: 0.3 K/UL (ref 0–0.5)
EOSINOPHIL NFR BLD: 5.8 % (ref 0–8)
ERYTHROCYTE [DISTWIDTH] IN BLOOD BY AUTOMATED COUNT: 15 % (ref 11.5–14.5)
EST. GFR  (NO RACE VARIABLE): 56.5 ML/MIN/1.73 M^2
ESTIMATED AVG GLUCOSE: 160 MG/DL (ref 68–131)
GLUCOSE SERPL-MCNC: 109 MG/DL (ref 70–110)
GLUCOSE UR QL STRIP: NEGATIVE
HBA1C MFR BLD: 7.2 % (ref 4–5.6)
HCT VFR BLD AUTO: 36.2 % (ref 37–48.5)
HDLC SERPL-MCNC: 60 MG/DL (ref 40–75)
HDLC SERPL: 24 % (ref 20–50)
HGB BLD-MCNC: 12 G/DL (ref 12–16)
HGB UR QL STRIP: NEGATIVE
IMM GRANULOCYTES # BLD AUTO: 0.01 K/UL (ref 0–0.04)
IMM GRANULOCYTES NFR BLD AUTO: 0.2 % (ref 0–0.5)
INR PPP: 1 (ref 0.8–1.2)
KETONES UR QL STRIP: ABNORMAL
LDLC SERPL CALC-MCNC: 166.4 MG/DL (ref 63–159)
LEUKOCYTE ESTERASE UR QL STRIP: NEGATIVE
LYMPHOCYTES # BLD AUTO: 2.3 K/UL (ref 1–4.8)
LYMPHOCYTES NFR BLD: 40.1 % (ref 18–48)
MCH RBC QN AUTO: 25.9 PG (ref 27–31)
MCHC RBC AUTO-ENTMCNC: 33.1 G/DL (ref 32–36)
MCV RBC AUTO: 78 FL (ref 82–98)
MONOCYTES # BLD AUTO: 0.4 K/UL (ref 0.3–1)
MONOCYTES NFR BLD: 6.3 % (ref 4–15)
NEUTROPHILS # BLD AUTO: 2.7 K/UL (ref 1.8–7.7)
NEUTROPHILS NFR BLD: 46.9 % (ref 38–73)
NITRITE UR QL STRIP: NEGATIVE
NONHDLC SERPL-MCNC: 190 MG/DL
NRBC BLD-RTO: 0 /100 WBC
PH UR STRIP: 6 [PH] (ref 5–8)
PLATELET # BLD AUTO: 303 K/UL (ref 150–450)
PMV BLD AUTO: 11.2 FL (ref 9.2–12.9)
POTASSIUM SERPL-SCNC: 4.2 MMOL/L (ref 3.5–5.1)
PROT SERPL-MCNC: 7.7 G/DL (ref 6–8.4)
PROT UR QL STRIP: NEGATIVE
PROTHROMBIN TIME: 10.9 SEC (ref 9–12.5)
RBC # BLD AUTO: 4.64 M/UL (ref 4–5.4)
SODIUM SERPL-SCNC: 139 MMOL/L (ref 136–145)
SP GR UR STRIP: 1.02 (ref 1–1.03)
TRIGL SERPL-MCNC: 118 MG/DL (ref 30–150)
TSH SERPL DL<=0.005 MIU/L-ACNC: 0.42 UIU/ML (ref 0.4–4)
URN SPEC COLLECT METH UR: ABNORMAL
WBC # BLD AUTO: 5.69 K/UL (ref 3.9–12.7)

## 2024-08-05 PROCEDURE — 1159F MED LIST DOCD IN RCRD: CPT | Mod: CPTII,S$GLB,, | Performed by: FAMILY MEDICINE

## 2024-08-05 PROCEDURE — 85730 THROMBOPLASTIN TIME PARTIAL: CPT | Performed by: FAMILY MEDICINE

## 2024-08-05 PROCEDURE — 80061 LIPID PANEL: CPT | Performed by: FAMILY MEDICINE

## 2024-08-05 PROCEDURE — 83036 HEMOGLOBIN GLYCOSYLATED A1C: CPT | Performed by: FAMILY MEDICINE

## 2024-08-05 PROCEDURE — 3008F BODY MASS INDEX DOCD: CPT | Mod: CPTII,S$GLB,, | Performed by: FAMILY MEDICINE

## 2024-08-05 PROCEDURE — 99214 OFFICE O/P EST MOD 30 MIN: CPT | Mod: S$GLB,,, | Performed by: FAMILY MEDICINE

## 2024-08-05 PROCEDURE — 85025 COMPLETE CBC W/AUTO DIFF WBC: CPT | Performed by: FAMILY MEDICINE

## 2024-08-05 PROCEDURE — 3079F DIAST BP 80-89 MM HG: CPT | Mod: CPTII,S$GLB,, | Performed by: FAMILY MEDICINE

## 2024-08-05 PROCEDURE — 99999 PR PBB SHADOW E&M-EST. PATIENT-LVL IV: CPT | Mod: PBBFAC,,, | Performed by: FAMILY MEDICINE

## 2024-08-05 PROCEDURE — 81003 URINALYSIS AUTO W/O SCOPE: CPT | Performed by: FAMILY MEDICINE

## 2024-08-05 PROCEDURE — 85610 PROTHROMBIN TIME: CPT | Performed by: FAMILY MEDICINE

## 2024-08-05 PROCEDURE — G2211 COMPLEX E/M VISIT ADD ON: HCPCS | Mod: S$GLB,,, | Performed by: FAMILY MEDICINE

## 2024-08-05 PROCEDURE — 84443 ASSAY THYROID STIM HORMONE: CPT | Performed by: FAMILY MEDICINE

## 2024-08-05 PROCEDURE — 95251 CONT GLUC MNTR ANALYSIS I&R: CPT | Mod: NDTC,,, | Performed by: PHYSICIAN ASSISTANT

## 2024-08-05 PROCEDURE — 80053 COMPREHEN METABOLIC PANEL: CPT | Performed by: FAMILY MEDICINE

## 2024-08-05 PROCEDURE — 1160F RVW MEDS BY RX/DR IN RCRD: CPT | Mod: CPTII,S$GLB,, | Performed by: FAMILY MEDICINE

## 2024-08-05 PROCEDURE — 99213 OFFICE O/P EST LOW 20 MIN: CPT | Mod: 95,,, | Performed by: PHYSICIAN ASSISTANT

## 2024-08-05 PROCEDURE — 3052F HG A1C>EQUAL 8.0%<EQUAL 9.0%: CPT | Mod: CPTII,S$GLB,, | Performed by: FAMILY MEDICINE

## 2024-08-05 PROCEDURE — G2211 COMPLEX E/M VISIT ADD ON: HCPCS | Mod: 95,,, | Performed by: PHYSICIAN ASSISTANT

## 2024-08-05 PROCEDURE — 3052F HG A1C>EQUAL 8.0%<EQUAL 9.0%: CPT | Mod: CPTII,95,, | Performed by: PHYSICIAN ASSISTANT

## 2024-08-05 PROCEDURE — 3075F SYST BP GE 130 - 139MM HG: CPT | Mod: CPTII,S$GLB,, | Performed by: FAMILY MEDICINE

## 2024-08-06 ENCOUNTER — HOSPITAL ENCOUNTER (OUTPATIENT)
Dept: CARDIOLOGY | Facility: HOSPITAL | Age: 64
Discharge: HOME | End: 2024-08-06
Payer: COMMERCIAL

## 2024-08-06 ENCOUNTER — HOSPITAL ENCOUNTER (OUTPATIENT)
Dept: RADIOLOGY | Facility: HOSPITAL | Age: 64
Discharge: HOME | End: 2024-08-06
Attending: FAMILY MEDICINE
Payer: COMMERCIAL

## 2024-08-06 ENCOUNTER — PATIENT MESSAGE (OUTPATIENT)
Dept: INTERNAL MEDICINE | Facility: CLINIC | Age: 64
End: 2024-08-06
Payer: COMMERCIAL

## 2024-08-06 DIAGNOSIS — Z01.818 PRE-OPERATIVE CLEARANCE: ICD-10-CM

## 2024-08-06 DIAGNOSIS — D16.5 AMELOBLASTOMA OF JAW: ICD-10-CM

## 2024-08-06 LAB
OHS QRS DURATION: 70 MS
OHS QTC CALCULATION: 429 MS

## 2024-08-06 PROCEDURE — 71046 X-RAY EXAM CHEST 2 VIEWS: CPT | Mod: TC

## 2024-08-06 PROCEDURE — 93010 ELECTROCARDIOGRAM REPORT: CPT | Mod: ,,, | Performed by: INTERNAL MEDICINE

## 2024-08-06 PROCEDURE — 71046 X-RAY EXAM CHEST 2 VIEWS: CPT | Mod: 26,,, | Performed by: RADIOLOGY

## 2024-08-06 PROCEDURE — 93005 ELECTROCARDIOGRAM TRACING: CPT

## 2024-08-08 ENCOUNTER — TELEPHONE (OUTPATIENT)
Dept: DIABETES | Facility: CLINIC | Age: 64
End: 2024-08-08
Payer: COMMERCIAL

## 2024-08-27 ENCOUNTER — TELEPHONE (OUTPATIENT)
Dept: DIABETES | Facility: CLINIC | Age: 64
End: 2024-08-27
Payer: COMMERCIAL

## 2024-08-27 DIAGNOSIS — E11.65 TYPE 2 DIABETES MELLITUS WITH HYPERGLYCEMIA, WITH LONG-TERM CURRENT USE OF INSULIN: Primary | ICD-10-CM

## 2024-08-27 DIAGNOSIS — Z79.4 TYPE 2 DIABETES MELLITUS WITH HYPERGLYCEMIA, WITH LONG-TERM CURRENT USE OF INSULIN: Primary | ICD-10-CM

## 2024-08-27 NOTE — TELEPHONE ENCOUNTER
----- Message from Zoraida Lesli sent at 8/27/2024 10:02 AM CDT -----  Regarding: speak to staff  Contact: [facility]   Kassandra Braun NP - [facility] called requesting to speak to Staff regarding patient above.   Regarding Insulin and Ozempic PT on she recently had a PEG tube placed and is on tube feeding  No further information provided    Call back # Kassandra Braun NP  [phone]

## 2024-08-28 RX ORDER — INSULIN ASPART 100 [IU]/ML
INJECTION, SOLUTION INTRAVENOUS; SUBCUTANEOUS
Qty: 15 ML | Refills: 11 | Status: SHIPPED | OUTPATIENT
Start: 2024-08-28

## 2024-08-28 NOTE — TELEPHONE ENCOUNTER
Pt s/p jaw reconstruction with PEG tube. Est for 2 wks. Holding ozempic. Mildly hyperglycemic but not using correction insulin. Refill novolog with review in 2 days for need to restart ozempic. Concerned about vomiting risk       CURRENT DM MEDICATIONS:   Novolog every 3 hours as needed   Ozempic - hold                  To Correct Blood Sugar (TO add to meal time dose OR to be used as correction after 3 hours if not eating:    - 180: +2 units   - 200: +3 units    - 220: +4 units   - 240: +5 units   - 260: +6 units   - 280: +7 units   - 300: +8 units   - 320: +9 units   - 340: +10 units   - 380: +11 units   - 400: +12 units  BG +400: +13 units - THAIS Aguilera PA-C, BC-ADM  [facility]

## 2024-08-29 ENCOUNTER — TELEPHONE (OUTPATIENT)
Dept: DIABETES | Facility: CLINIC | Age: 64
End: 2024-08-29
Payer: COMMERCIAL

## 2024-08-29 NOTE — TELEPHONE ENCOUNTER
Called pharmacy to f/u on novolog. Script went through under brand. Patient co pay $30. Call patient to inform. Pt voiced understanding

## 2024-08-30 ENCOUNTER — PATIENT MESSAGE (OUTPATIENT)
Dept: DIABETES | Facility: CLINIC | Age: 64
End: 2024-08-30
Payer: COMMERCIAL

## 2024-09-03 ENCOUNTER — TELEPHONE (OUTPATIENT)
Dept: INTERNAL MEDICINE | Facility: CLINIC | Age: 64
End: 2024-09-03
Payer: COMMERCIAL

## 2024-09-03 ENCOUNTER — PATIENT MESSAGE (OUTPATIENT)
Dept: DIABETES | Facility: CLINIC | Age: 64
End: 2024-09-03
Payer: COMMERCIAL

## 2024-09-03 NOTE — TELEPHONE ENCOUNTER
----- Message from Erin John sent at 9/3/2024  9:42 AM CDT -----  Contact: [phone] Patient  Caller is requesting an earlier appointment then we can schedule.  Caller is requesting a message be sent to the provider.    If this is for urgent care symptoms, did you offer other providers at this location, providers at other locations, or [facility]? (yes, no, n/a):      If this is for the patients physical, did you offer to schedule next available and put on wait list, or to see NP or PA for their physical?  (yes, no, n/a):      When is the next available appointment with their provider:  09/06/2024    Reason for the appointment:  peg tube issues/ discuss needing a nutritionist/ hard to feed pt through peg tube so need someone to see today would like to see Dr Cat    Patient preference of timeframe to be scheduled:  ASAP today 09/03/2024    Would the patient like a call back, or a response through their [facility] portal?:   Call Back Please. Thank you    Comments:

## 2024-09-04 ENCOUNTER — TELEPHONE (OUTPATIENT)
Dept: NUTRITION | Facility: CLINIC | Age: 64
End: 2024-09-04
Payer: COMMERCIAL

## 2024-09-04 NOTE — TELEPHONE ENCOUNTER
Spoke with patient's daughter regarding tube feeding concerns. Suggested flushing with at least  ml water after each feeding. Suggested purchase of Diabetisource at [facility] due to reasonable cost. Provided my name, number, and location.   Signature: Rosario Flynn, MPH, RD, LDN

## 2024-09-04 NOTE — TELEPHONE ENCOUNTER
Hi ladies, I called Ms. Le and spoke with her daughter and gave her my phone number. I suggested more water flush after feeds (60 ml - 120 ml, not the 30 ml she was told) and also suggested she buy formula from [employer] [facility], which sells a 24 case of Diabetisource 1.2 at cost (for only $15.58) from their retail department. I learned that from our radiation oncologist who used to work at [facility]. The family has my name, number and location now if they need further assistance. Thank you for the referral!

## 2024-09-06 ENCOUNTER — PATIENT MESSAGE (OUTPATIENT)
Dept: DIABETES | Facility: CLINIC | Age: 64
End: 2024-09-06
Payer: COMMERCIAL

## 2024-09-09 ENCOUNTER — PATIENT MESSAGE (OUTPATIENT)
Dept: DIABETES | Facility: CLINIC | Age: 64
End: 2024-09-09
Payer: COMMERCIAL

## 2024-09-09 ENCOUNTER — TELEPHONE (OUTPATIENT)
Dept: DIABETES | Facility: CLINIC | Age: 64
End: 2024-09-09
Payer: COMMERCIAL

## 2024-09-09 NOTE — TELEPHONE ENCOUNTER
----- Message from Bulmaro Aguilera PA-C sent at 9/9/2024  2:37 PM CDT -----  Please pull dexcom  ----- Message -----  From: Bulmaro Aguilera PA-C  Sent: 9/9/2024  12:00 AM CDT  To: Bulmaro Aguilera PA-C; #    Please pull DEXCOM

## 2024-10-01 ENCOUNTER — OFFICE VISIT (OUTPATIENT)
Dept: DIABETES | Facility: CLINIC | Age: 64
End: 2024-10-01
Payer: COMMERCIAL

## 2024-10-01 DIAGNOSIS — Z79.4 TYPE 2 DIABETES MELLITUS WITH HYPERGLYCEMIA, WITH LONG-TERM CURRENT USE OF INSULIN: Primary | ICD-10-CM

## 2024-10-01 DIAGNOSIS — Z93.1 S/P PERCUTANEOUS ENDOSCOPIC GASTROSTOMY (PEG) TUBE PLACEMENT: ICD-10-CM

## 2024-10-01 DIAGNOSIS — I15.2 HYPERTENSION ASSOCIATED WITH DIABETES: ICD-10-CM

## 2024-10-01 DIAGNOSIS — E78.5 HYPERLIPIDEMIA, UNSPECIFIED HYPERLIPIDEMIA TYPE: ICD-10-CM

## 2024-10-01 DIAGNOSIS — E11.59 HYPERTENSION ASSOCIATED WITH DIABETES: ICD-10-CM

## 2024-10-01 DIAGNOSIS — E11.65 TYPE 2 DIABETES MELLITUS WITH HYPERGLYCEMIA, WITH LONG-TERM CURRENT USE OF INSULIN: Primary | ICD-10-CM

## 2024-10-01 PROCEDURE — 99213 OFFICE O/P EST LOW 20 MIN: CPT | Mod: 95,,, | Performed by: PHYSICIAN ASSISTANT

## 2024-10-01 PROCEDURE — G2211 COMPLEX E/M VISIT ADD ON: HCPCS | Mod: 95,,, | Performed by: PHYSICIAN ASSISTANT

## 2024-10-01 PROCEDURE — 3051F HG A1C>EQUAL 7.0%<8.0%: CPT | Mod: CPTII,95,, | Performed by: PHYSICIAN ASSISTANT

## 2024-10-01 PROCEDURE — 95251 CONT GLUC MNTR ANALYSIS I&R: CPT | Mod: NDTC,,, | Performed by: PHYSICIAN ASSISTANT

## 2024-10-01 NOTE — PATIENT INSTRUCTIONS
CURRENT DM MEDICATIONS:   Novolog every 3 hours as needed   Ozempic - hold                  To Correct Blood Sugar (TO add to meal time dose OR to be used as correction after 3 hours if not eating:    - 180: +2 units   - 200: +3 units    - 220: +4 units   - 240: +5 units   - 260: +6 units   - 280: +7 units   - 300: +8 units   - 320: +9 units   - 340: +10 units   - 380: +11 units   - 400: +12 units  BG +400: +13 units - MAX

## 2024-10-01 NOTE — PROGRESS NOTES
PCP: Rosangela Cat MD    Subjective:     Chief Complaint: Diabetes - Established Patient    TELEMEDICINE VISIT:     The patient location is: Home  The chief complaint leading to consultation is: Diabetes Follow up  Visit type: Virtual visit with synchronous audio and video  Total time spent with patient: 6  Each patient to whom he or she provides medical services by telemedicine is:  (1) informed of the relationship between the physician and patient and the respective role of any other health care provider with respect to management of the patient; and (2) notified that he or she may decline to receive medical services by telemedicine and may withdraw from such care at any time.    Notes: N/A      HISTORY OF PRESENT ILLNESS: 64 y.o.   female presenting for diabetes management visit.   The patient's last visit with me was on 8/5/2024.   Patient has had diabetes with past history of low C peptide since 10 years ago.   Pertinent to decision making is the following comorbidities: HTN, HLD and Multinodular goiter s/p thyroid surgery, GENTRY on CPAP, Statin Intolerance, and Medical Noncompliance  Patient has the following Diabetes complications: without complications  She  has attended diabetes education in the past.      Patient's most recent A1c of 7.2% was completed 2 months ago.   Patient states since Her last A1c Her blood glucose levels have been high  after meals.   Patient monitors blood glucose 4 times per day and Continuously with personal CGM Dexcom.   Patient blood glucose monitoring device will be uploaded into Media Section today.          Patient reports shows baseline euglycemia with some mild postrprandial hyperglycemia following peg tube feedings. Pt has been supplementing with more shake for increased volume but recently advised to change to water for extra volume.     Patient endorses the following diabetes related symptoms:  Depression and Anxiety . Previously seeing therapy and  psychiatry. Has history of medication side effects with depression meds including agitation and SI. Did recently try group therapy but did not like it. Plans to try some individual therapy.   Patient is due today for the following diabetes-related health maintenance standards: Eye Exam, Urine Microalbumin/creatinine ratio, Influenza Vaccine, COVID-19 Vaccine , and RSV Vaccine .   She voices recent hospital admissions or emergency room visits for jaw reconstruction surgery on 08/26/24.   She denies having hypoglycemia.  Patient's concerns today include glycemic control.   Patient medication regimen is as below. Will have Peg tube for likely 2 more wks minimum.     CURRENT DM MEDICATIONS:   Novolog every 3 hours as needed   Ozempic - hold                  To Correct Blood Sugar (TO add to meal time dose OR to be used as correction after 3 hours if not eating:    - 180: +2 units   - 200: +3 units    - 220: +4 units   - 240: +5 units   - 260: +6 units   - 280: +7 units   - 300: +8 units   - 320: +9 units   - 340: +10 units   - 380: +11 units   - 400: +12 units  BG +400: +13 units - MAX    Patient has failed the following Diabetes medications:   Glucovance   Basaglar       Labs Reviewed.       Lab Results   Component Value Date    CPEPTIDE 1.47 07/05/2024     Lab Results   Component Value Date    GLUTAMICACID 0.00 04/10/2023          //   , There is no height or weight on file to calculate BMI.  Her blood sugar in clinic today is:    Lab Results   Component Value Date    POCGLU 171 (A) 07/08/2024       Review of Systems   Constitutional:  Negative for activity change, appetite change, chills and fever.   HENT:  Negative for dental problem, mouth sores, nosebleeds, sore throat and trouble swallowing.    Eyes:  Negative for pain and discharge.   Respiratory:  Negative for shortness of breath, wheezing and stridor.    Cardiovascular:  Negative for chest pain,  palpitations and leg swelling.   Gastrointestinal:  Negative for abdominal pain, diarrhea, nausea and vomiting.   Endocrine: Negative for polydipsia, polyphagia and polyuria.   Genitourinary:  Negative for dysuria, frequency and urgency.   Musculoskeletal:  Negative for joint swelling and myalgias.   Skin:  Negative for rash and wound.   Neurological:  Negative for dizziness, syncope, weakness and headaches.   Psychiatric/Behavioral:  Negative for behavioral problems and dysphoric mood.          Diabetes Management Status  Statin: Not taking  ACE/ARB: Taking    Screening or Prevention Patient's value Goal Complete/Controlled?   HgA1C Testing and Control   Lab Results   Component Value Date    HGBA1C 7.2 (H) 2024      Annually/Less than 8% No   Lipid profile : 2024 Annually Yes   LDL control Lab Results   Component Value Date    LDLCALC 112 2024    Annually/Less than 100 mg/dl  No   Nephropathy screening Lab Results   Component Value Date    MICALBCREAT 47.8 (H) 10/16/2023     Lab Results   Component Value Date    PROTEINUA Negative 2024    Annually Yes   Blood pressure BP Readings from Last 1 Encounters:   24 132/89    Less than 140/90 Yes   Dilated retinal exam : 2017 Annually No    Foot exam   : 2024 Annually No     Social History     Socioeconomic History    Marital status:     Number of children: 3   Occupational History     Employer: [employer]   Tobacco Use    Smoking status: Former     Current packs/day: 0.00     Average packs/day: 1 pack/day for 28.0 years (28.0 ttl pk-yrs)     Types: Cigarettes     Start date:      Quit date: 2007     Years since quittin.7    Smokeless tobacco: Never    Tobacco comments:     smoked off and on from 2341-4861 about 15 years total years smoking.   Substance and Sexual Activity    Alcohol use: Yes     Alcohol/week: 21.0 standard drinks of alcohol     Types: 21 Glasses of wine per week    Drug use: No     Sexual activity: Yes     Partners: Male   Social History Narrative    , 3 kids, [employer]BS.     Social Drivers of Health     Financial Resource Strain: Low Risk  (8/20/2024)    [facility]    Overall Financial Resource Strain (CARDIA)     Difficulty of Paying Living Expenses: Not hard at all   Food Insecurity: No Food Insecurity (8/23/2024)    [facility] and Affiliates    Hunger Vital Sign     Worried About Running Out of Food in the Last Year: Never true     Ran Out of Food in the Last Year: Never true   Transportation Needs: No Transportation Needs (8/23/2024)    [facility]    PRAPARE - Transportation     Lack of Transportation (Medical): No     Lack of Transportation (Non-Medical): No   Physical Activity: Inactive (8/20/2024)    [facility]    Exercise Vital Sign     Days of Exercise per Week: 0 days     Minutes of Exercise per Session: 0 min   Stress: No Stress Concern Present (8/20/2024)    Received [facility] and [facility]    [place] [employer] - Occupational Stress Questionnaire     Feeling of Stress : Not at all   Housing Stability: Low Risk  (9/23/2022)    Housing Stability Vital Sign     Unable to Pay for Housing in the Last Year: No     Number of Places Lived in the Last Year: 1     Unstable Housing in the Last Year: No     Past Medical History:   Diagnosis Date    Abnormal Pap smear of vagina     Arthritis     Left shoulder    Diabetes mellitus type II     Dr Ayala manages DM     Hyperlipidemia     Hypertension     Thyroid disease     Dr Melissa garcia- goitre+       Objective:         Physical Exam  Constitutional:       General: She is not in acute distress.     Appearance: She is well-developed. She is not diaphoretic.   HENT:      Head: Normocephalic and atraumatic.      Right Ear: External ear normal.      Left Ear: External ear normal.      Nose: Nose normal.   Eyes:      General:         Right eye: No discharge.         Left eye: No discharge.   Pulmonary:      Effort: Pulmonary effort is normal. No respiratory distress.      Breath sounds: No stridor.   Musculoskeletal:         General: Normal range of motion.      Cervical back: Normal range of motion.   Skin:     Coloration: Skin is not pale.   Neurological:      Mental Status: She is alert and oriented to person, place, and time. Mental status is at baseline.      Motor: No abnormal muscle tone.      Coordination: Coordination normal.   Psychiatric:         Mood and Affect: Mood normal.         Behavior: Behavior normal.         Thought Content: Thought content normal.         Judgment: Judgment normal.           Assessment / Plan:     Type 2 diabetes mellitus with hyperglycemia, with long-term current use of insulin  -     Microalbumin/Creatinine Ratio, Urine; Future; Expected date: 10/01/2024  -     Hemoglobin A1C; Future; Expected date: 10/01/2024    Hypertension associated with diabetes    Hyperlipidemia, unspecified hyperlipidemia type    S/P percutaneous endoscopic gastrostomy (PEG) tube placement        Additional Plan Details:    - POCT Glucose  - Encouraged continuation of lifestyle changes including regular exercise and limiting carbohydrates to 30-45 grams per meal threes times daily and 15 grams per snack with a limit of two daily.   - Encouraged continued monitoring of blood glucose with maintenance of 4 times daily and Continuously with personal CGM Dexcom.    - Current DM Medication Regimen: Change Novolog correction dosing per scale below. Focus on compliance of dosing. Hold Ozempic with current Peg tube. IF no  improvement with pp hyperglycemia with adding H20 to tube feeds will start pre meal dosing in interim.   - Nonfasting labs sched  - Health Maintenance standards addressed today: Eye Exam - will be completed outside of [facility] and patient will schedule, Urine Microalbumin / Creatinine Ratio scheduled, Flu Shot - patient would like to complete outside of [facility], COVID - 19 Vaccine - patient will schedule outside of [facility] , and RSV vaccine  - Nursing Visit: Patient is age 79 or younger with an A1c of 7.5 or greater and will not need nursing visit at this time .   - Follow up in 3 wks.    CURRENT DM MEDICATIONS:   Novolog every 3 hours as needed   Ozempic - hold                  To Correct Blood Sugar (TO add to meal time dose OR to be used as correction after 3 hours if not eating:    - 180: +2 units   - 200: +3 units    - 220: +4 units   - 240: +5 units   - 260: +6 units   - 280: +7 units   - 300: +8 units   - 320: +9 units   - 340: +10 units   - 380: +11 units   - 400: +12 units  BG +400: +13 units - MAX Blakeney McKnight, PA-C [facility]

## 2024-10-04 ENCOUNTER — PATIENT MESSAGE (OUTPATIENT)
Dept: DIABETES | Facility: CLINIC | Age: 64
End: 2024-10-04
Payer: COMMERCIAL

## 2024-10-22 ENCOUNTER — OFFICE VISIT (OUTPATIENT)
Dept: DIABETES | Facility: CLINIC | Age: 64
End: 2024-10-22
Payer: COMMERCIAL

## 2024-10-22 DIAGNOSIS — Z79.4 TYPE 2 DIABETES MELLITUS WITH HYPERGLYCEMIA, WITH LONG-TERM CURRENT USE OF INSULIN: Primary | ICD-10-CM

## 2024-10-22 DIAGNOSIS — E11.65 TYPE 2 DIABETES MELLITUS WITH HYPERGLYCEMIA, WITH LONG-TERM CURRENT USE OF INSULIN: Primary | ICD-10-CM

## 2024-10-22 DIAGNOSIS — E78.5 HYPERLIPIDEMIA, UNSPECIFIED HYPERLIPIDEMIA TYPE: ICD-10-CM

## 2024-10-22 DIAGNOSIS — G72.0 STATIN MYOPATHY: ICD-10-CM

## 2024-10-22 DIAGNOSIS — G47.33 OSA (OBSTRUCTIVE SLEEP APNEA): ICD-10-CM

## 2024-10-22 DIAGNOSIS — E11.59 HYPERTENSION ASSOCIATED WITH DIABETES: ICD-10-CM

## 2024-10-22 DIAGNOSIS — E04.2 MULTIPLE THYROID NODULES: ICD-10-CM

## 2024-10-22 DIAGNOSIS — Z93.1 S/P PERCUTANEOUS ENDOSCOPIC GASTROSTOMY (PEG) TUBE PLACEMENT: ICD-10-CM

## 2024-10-22 DIAGNOSIS — T46.6X5A STATIN MYOPATHY: ICD-10-CM

## 2024-10-22 DIAGNOSIS — Z98.890 HISTORY OF THYROID SURGERY: ICD-10-CM

## 2024-10-22 DIAGNOSIS — I15.2 HYPERTENSION ASSOCIATED WITH DIABETES: ICD-10-CM

## 2024-10-22 PROCEDURE — G2211 COMPLEX E/M VISIT ADD ON: HCPCS | Mod: 95,,, | Performed by: PHYSICIAN ASSISTANT

## 2024-10-22 PROCEDURE — 99214 OFFICE O/P EST MOD 30 MIN: CPT | Mod: 95,,, | Performed by: PHYSICIAN ASSISTANT

## 2024-10-22 PROCEDURE — 3051F HG A1C>EQUAL 7.0%<8.0%: CPT | Mod: CPTII,95,, | Performed by: PHYSICIAN ASSISTANT

## 2024-10-22 PROCEDURE — 95251 CONT GLUC MNTR ANALYSIS I&R: CPT | Mod: NDTC,,, | Performed by: PHYSICIAN ASSISTANT

## 2024-10-22 NOTE — PATIENT INSTRUCTIONS
CURRENT DM MEDICATIONS:   Novolog every 3 hours as needed   Novolog 2-3 units before meals - dose right before meal since eating slower                 To Correct Blood Sugar (TO add to meal time dose OR to be used as correction after 3 hours if not eating:    - 180: +2 units   - 200: +3 units    - 220: +4 units   - 240: +5 units   - 260: +6 units   - 280: +7 units   - 300: +8 units   - 320: +9 units   - 340: +10 units   - 380: +11 units   - 400: +12 units  BG +400: +13 units - MAX

## 2024-10-22 NOTE — PROGRESS NOTES
PCP: Rosangela Cat MD    Subjective:     Chief Complaint: Diabetes - Established Patient    TELEMEDICINE VISIT:     The patient location is: Home  The chief complaint leading to consultation is: Diabetes Follow up  Visit type: Virtual visit with synchronous audio and video  Total time spent with patient: 12  Each patient to whom he or she provides medical services by telemedicine is:  (1) informed of the relationship between the physician and patient and the respective role of any other health care provider with respect to management of the patient; and (2) notified that he or she may decline to receive medical services by telemedicine and may withdraw from such care at any time.    Notes: N/A      HISTORY OF PRESENT ILLNESS: 64 y.o.   female presenting for diabetes management visit.   The patient's last visit with me was on 8/5/2024.   Patient has had diabetes with past history of low C peptide since 10 years ago.   Pertinent to decision making is the following comorbidities: HTN, HLD and Multinodular goiter s/p thyroid surgery, GENTRY on CPAP, Statin Intolerance, and Medical Noncompliance  Patient has the following Diabetes complications: without complications  She  has attended diabetes education in the past.      Patient's most recent A1c of 7.2% was completed 2 months ago.   Patient states since Her last A1c Her blood glucose levels have been high  after meals.   Patient monitors blood glucose 4 times per day and Continuously with personal CGM Dexcom.   Patient blood glucose monitoring device will be uploaded into Media Section today.          Patient reports shows baseline euglycemia with some mild postrprandial hyperglycemia following peg tube feedings. Pt has been supplementing with more shake to meet caloric needs. Cleared for soft diet but has been avoiding due to concerns about BG.     Patient endorses the following diabetes related symptoms:  Depression and Anxiety . Previously seeing  therapy and psychiatry. Has history of medication side effects with depression meds including agitation and SI. Did recently try group therapy but did not like it. Plans to try some individual therapy.   Patient is due today for the following diabetes-related health maintenance standards: Eye Exam, Urine Microalbumin/creatinine ratio, Influenza Vaccine, COVID-19 Vaccine , and RSV Vaccine .   She voices recent hospital admissions or emergency room visits for jaw reconstruction surgery on 08/26/24. Pt will need secondary surgery due to skin breakdown.   She denies having hypoglycemia.  Patient's concerns today include glycemic control.   Patient medication regimen is as below. Will have Peg tube for longer due to repeat surgery.     CURRENT DM MEDICATIONS:   Novolog every 3 hours as needed   Ozempic - hold                  To Correct Blood Sugar (TO add to meal time dose OR to be used as correction after 3 hours if not eating:    - 180: +2 units   - 200: +3 units    - 220: +4 units   - 240: +5 units   - 260: +6 units   - 280: +7 units   - 300: +8 units   - 320: +9 units   - 340: +10 units   - 380: +11 units   - 400: +12 units  BG +400: +13 units - MAX    Patient has failed the following Diabetes medications:   Glucovance   Basaglar       Labs Reviewed.       Lab Results   Component Value Date    CPEPTIDE 1.47 07/05/2024     Lab Results   Component Value Date    GLUTAMICACID 0.00 04/10/2023          //   , There is no height or weight on file to calculate BMI.  Her blood sugar in clinic today is:    Lab Results   Component Value Date    POCGLU 171 (A) 07/08/2024       Review of Systems   Constitutional:  Negative for activity change, appetite change, chills and fever.   HENT:  Negative for dental problem, mouth sores, nosebleeds, sore throat and trouble swallowing.    Eyes:  Negative for pain and discharge.   Respiratory:  Negative for shortness of  breath, wheezing and stridor.    Cardiovascular:  Negative for chest pain, palpitations and leg swelling.   Gastrointestinal:  Negative for abdominal pain, diarrhea, nausea and vomiting.   Endocrine: Negative for polydipsia, polyphagia and polyuria.   Genitourinary:  Negative for dysuria, frequency and urgency.   Musculoskeletal:  Negative for joint swelling and myalgias.   Skin:  Negative for rash and wound.   Neurological:  Negative for dizziness, syncope, weakness and headaches.   Psychiatric/Behavioral:  Negative for behavioral problems and dysphoric mood.          Diabetes Management Status  Statin: Not taking  ACE/ARB: Taking    Screening or Prevention Patient's value Goal Complete/Controlled?   HgA1C Testing and Control   Lab Results   Component Value Date    HGBA1C 7.2 (H) 2024      Annually/Less than 8% No   Lipid profile : 2024 Annually Yes   LDL control Lab Results   Component Value Date    LDLCALC 112 2024    Annually/Less than 100 mg/dl  No   Nephropathy screening Lab Results   Component Value Date    MICALBCREAT 47.8 (H) 10/16/2023     Lab Results   Component Value Date    PROTEINUA Negative 2024    Annually Yes   Blood pressure BP Readings from Last 1 Encounters:   24 132/89    Less than 140/90 Yes   Dilated retinal exam : 2017 Annually No    Foot exam   : 2024 Annually No     Social History     Socioeconomic History    Marital status:     Number of children: 3   Occupational History     Employer: [employer]   Tobacco Use    Smoking status: Former     Current packs/day: 0.00     Average packs/day: 1 pack/day for 28.0 years (28.0 ttl pk-yrs)     Types: Cigarettes     Start date:      Quit date: 2007     Years since quittin.7    Smokeless tobacco: Never    Tobacco comments:     smoked off and on from 0430-7583 about 15 years total years smoking.   Substance and Sexual Activity    Alcohol use: Yes     Alcohol/week: 21.0 standard  drinks of alcohol     Types: 21 Glasses of wine per week    Drug use: No    Sexual activity: Yes     Partners: Male   Social History Narrative    , 3 kids, [employer].     Social Drivers of Health     Financial Resource Strain: Low Risk  (8/20/2024)    [facility] and Aff[facility]ates    Overall Financial Resource Strain (CARDIA)     Difficulty of Paying Living Expenses: Not hard at all   Food Insecurity: No Food Insecurity (8/23/2024)    Received [facility]    Hunger Vital Sign     Worried About Running Out of Food in the Last Year: Never true     Ran Out of Food in the Last Year: Never true   Transportation Needs: No Transportation Needs (8/23/2024)    [facility]    PRAPARE - Transportation     Lack of Transportation (Medical): No     Lack of Transportation (Non-Medical): No   Physical Activity: Inactive (8/20/2024)    Received from [facility]    Exercise Vital Sign     Days of Exercise per Week: 0 days     Minutes of Exercise per Session: 0 min   Stress: No Stress Concern Present (8/20/2024)    Received from [facility]idi[facility] and Affiliates    [place] of Occupational Health - Occupational Stress Questionnaire     Feeling of Stress : Not at all   Housing Stability: Low Risk  (9/23/2022)    Housing Stability Vital Sign     Unable to Pay for Housing in the Last Year: No     Number of Places Lived in the Last Year: 1     Unstable Housing in the Last Year: No     Past Medical History:   Diagnosis Date    Abnormal Pap smear of vagina     Arthritis     Left shoulder    Diabetes mellitus type II     Dr Ayala manages DM     Hyperlipidemia      Hypertension     Thyroid disease     Dr Torres marrioneaux- goitre+       Objective:        Physical Exam  Constitutional:       General: She is not in acute distress.     Appearance: She is well-developed. She is not diaphoretic.   HENT:      Head: Normocephalic and atraumatic.      Right Ear: External ear normal.      Left Ear: External ear normal.      Nose: Nose normal.   Eyes:      General:         Right eye: No discharge.         Left eye: No discharge.   Pulmonary:      Effort: Pulmonary effort is normal. No respiratory distress.      Breath sounds: No stridor.   Musculoskeletal:         General: Normal range of motion.      Cervical back: Normal range of motion.   Skin:     Coloration: Skin is not pale.   Neurological:      Mental Status: She is alert and oriented to person, place, and time. Mental status is at baseline.      Motor: No abnormal muscle tone.      Coordination: Coordination normal.   Psychiatric:         Mood and Affect: Mood normal.         Behavior: Behavior normal.         Thought Content: Thought content normal.         Judgment: Judgment normal.           Assessment / Plan:     Type 2 diabetes mellitus with hyperglycemia, with long-term current use of insulin    Hypertension associated with diabetes    Hyperlipidemia, unspecified hyperlipidemia type    S/P percutaneous endoscopic gastrostomy (PEG) tube placement    History of thyroid surgery    Multiple thyroid nodules    Statin myopathy    GENTRY (obstructive sleep apnea)        Additional Plan Details:    - POCT Glucose  - Encouraged continuation of lifestyle changes including regular exercise and limiting carbohydrates to 30-45 grams per meal threes times daily and 15 grams per snack with a limit of two daily.   - Encouraged continued monitoring of blood glucose with maintenance of 4 times daily and Continuously with personal CGM Dexcom.    - Current DM Medication Regimen: Change Novolog to 2-3 units before meals and correction dosing per  scale below.  Hold Ozempic with current Peg tube.  - Nonfasting labs sched  - Health Maintenance standards addressed today: Eye Exam - will be completed outside of [facility] and patient will schedule, Urine Microalbumin / Creatinine Ratio scheduled, Flu Shot - [facility], COVID - 19 Vaccine - [facility] , and RSV vaccine  - Nursing Visit: Patient is age 79 or younger with an A1c of 7.5 or greater and will not need nursing visit at this time .   - Follow up in 3 wks or sooner for post op visit.    CURRENT DM MEDICATIONS:   Novolog every 3 hours as needed   Novolog 2-3 units before meals - dose right before meal since eating slower                 To Correct Blood Sugar (TO add to meal time dose OR to be used as correction after 3 hours if not eating:    - 180: +2 units   - 200: +3 units    - 220: +4 units   - 240: +5 units   - 260: +6 units   - 280: +7 units   - 300: +8 units   - 320: +9 units   - 340: +10 units   - 380: +11 units   - 400: +12 units  BG +400: +13 units - MAX Blakeney McKnight, PA-C [facility]

## 2024-11-05 ENCOUNTER — OFFICE VISIT (OUTPATIENT)
Dept: INTERNAL MEDICINE | Facility: CLINIC | Age: 64
End: 2024-11-05
Payer: COMMERCIAL

## 2024-11-05 ENCOUNTER — LAB VISIT (OUTPATIENT)
Dept: LAB | Facility: HOSPITAL | Age: 64
End: 2024-11-05
Attending: FAMILY MEDICINE
Payer: COMMERCIAL

## 2024-11-05 ENCOUNTER — LAB VISIT (OUTPATIENT)
Dept: LAB | Facility: HOSPITAL | Age: 64
End: 2024-11-05
Payer: COMMERCIAL

## 2024-11-05 VITALS
SYSTOLIC BLOOD PRESSURE: 138 MMHG | BODY MASS INDEX: 19.73 KG/M2 | DIASTOLIC BLOOD PRESSURE: 86 MMHG | HEIGHT: 60 IN | OXYGEN SATURATION: 100 % | HEART RATE: 88 BPM | TEMPERATURE: 98 F | WEIGHT: 100.5 LBS

## 2024-11-05 DIAGNOSIS — E04.2 MULTIPLE THYROID NODULES: ICD-10-CM

## 2024-11-05 DIAGNOSIS — E11.65 TYPE 2 DIABETES MELLITUS WITH HYPERGLYCEMIA, WITH LONG-TERM CURRENT USE OF INSULIN: ICD-10-CM

## 2024-11-05 DIAGNOSIS — K59.04 CHRONIC IDIOPATHIC CONSTIPATION: ICD-10-CM

## 2024-11-05 DIAGNOSIS — Z78.9 STATIN INTOLERANCE: ICD-10-CM

## 2024-11-05 DIAGNOSIS — F41.8 ANXIETY ASSOCIATED WITH DEPRESSION: ICD-10-CM

## 2024-11-05 DIAGNOSIS — Z79.4 TYPE 2 DIABETES MELLITUS WITH HYPERGLYCEMIA, WITH LONG-TERM CURRENT USE OF INSULIN: ICD-10-CM

## 2024-11-05 DIAGNOSIS — D50.0 IRON DEFICIENCY ANEMIA DUE TO CHRONIC BLOOD LOSS: ICD-10-CM

## 2024-11-05 DIAGNOSIS — E10.59 HYPERTENSION ASSOCIATED WITH TYPE 1 DIABETES MELLITUS: ICD-10-CM

## 2024-11-05 DIAGNOSIS — E78.5 HYPERLIPIDEMIA DUE TO TYPE 1 DIABETES MELLITUS: ICD-10-CM

## 2024-11-05 DIAGNOSIS — I15.2 HYPERTENSION ASSOCIATED WITH TYPE 1 DIABETES MELLITUS: ICD-10-CM

## 2024-11-05 DIAGNOSIS — Z98.890 HISTORY OF THYROID SURGERY: ICD-10-CM

## 2024-11-05 DIAGNOSIS — E10.69 HYPERLIPIDEMIA DUE TO TYPE 1 DIABETES MELLITUS: ICD-10-CM

## 2024-11-05 DIAGNOSIS — F43.10 PTSD (POST-TRAUMATIC STRESS DISORDER): ICD-10-CM

## 2024-11-05 DIAGNOSIS — Z00.00 ROUTINE GENERAL MEDICAL EXAMINATION AT A HEALTH CARE FACILITY: ICD-10-CM

## 2024-11-05 DIAGNOSIS — Z00.00 ROUTINE GENERAL MEDICAL EXAMINATION AT A HEALTH CARE FACILITY: Primary | ICD-10-CM

## 2024-11-05 DIAGNOSIS — G47.33 OSA ON CPAP: ICD-10-CM

## 2024-11-05 DIAGNOSIS — D16.5 AMELOBLASTOMA OF JAW: ICD-10-CM

## 2024-11-05 PROBLEM — K08.23: Status: ACTIVE | Noted: 2024-04-12

## 2024-11-05 PROBLEM — R13.10 DYSPHAGIA: Status: ACTIVE | Noted: 2024-08-23

## 2024-11-05 LAB
ALBUMIN/CREAT UR: 12.8 UG/MG (ref 0–30)
BILIRUB UR QL STRIP: NEGATIVE
CLARITY UR: CLEAR
COLOR UR: YELLOW
CREAT UR-MCNC: 86 MG/DL (ref 15–325)
GLUCOSE UR QL STRIP: NEGATIVE
HGB UR QL STRIP: NEGATIVE
KETONES UR QL STRIP: NEGATIVE
LEUKOCYTE ESTERASE UR QL STRIP: NEGATIVE
MICROALBUMIN UR DL<=1MG/L-MCNC: 11 UG/ML
NITRITE UR QL STRIP: NEGATIVE
PH UR STRIP: 6 [PH] (ref 5–8)
PROT UR QL STRIP: NEGATIVE
SP GR UR STRIP: 1.02 (ref 1–1.03)
URN SPEC COLLECT METH UR: NORMAL

## 2024-11-05 PROCEDURE — 99999 PR PBB SHADOW E&M-EST. PATIENT-LVL IV: CPT | Mod: PBBFAC,,, | Performed by: FAMILY MEDICINE

## 2024-11-05 PROCEDURE — 82043 UR ALBUMIN QUANTITATIVE: CPT | Performed by: FAMILY MEDICINE

## 2024-11-05 PROCEDURE — 81003 URINALYSIS AUTO W/O SCOPE: CPT | Performed by: FAMILY MEDICINE

## 2024-11-05 PROCEDURE — 83036 HEMOGLOBIN GLYCOSYLATED A1C: CPT | Performed by: PHYSICIAN ASSISTANT

## 2024-11-05 RX ORDER — OXYCODONE HYDROCHLORIDE 5 MG/1
5 TABLET ORAL EVERY 6 HOURS PRN
COMMUNITY
Start: 2024-09-30

## 2024-11-05 RX ORDER — ACETAMINOPHEN 500 MG
TABLET ORAL
COMMUNITY
Start: 2024-09-30

## 2024-11-05 RX ORDER — IBUPROFEN 600 MG/1
TABLET ORAL
COMMUNITY
Start: 2024-09-30

## 2024-11-05 RX ORDER — GABAPENTIN 600 MG/1
300 TABLET ORAL 2 TIMES DAILY
COMMUNITY

## 2024-11-05 RX ORDER — INSULIN ASPART 100 [IU]/ML
3 INJECTION, SOLUTION INTRAVENOUS; SUBCUTANEOUS DAILY PRN
COMMUNITY
Start: 2024-08-28

## 2024-11-05 RX ORDER — CHLORHEXIDINE GLUCONATE ORAL RINSE 1.2 MG/ML
15 SOLUTION DENTAL
COMMUNITY
Start: 2024-09-29

## 2024-11-05 RX ORDER — SENNOSIDES 8.8 MG/5ML
5 LIQUID ORAL NIGHTLY PRN
Qty: 236 ML | Refills: 1 | Status: SHIPPED | OUTPATIENT
Start: 2024-11-05

## 2024-11-05 RX ORDER — SENNOSIDES 8.8 MG/5ML
LIQUID ORAL
COMMUNITY
Start: 2024-08-26 | End: 2024-11-05 | Stop reason: SDUPTHER

## 2024-11-05 RX ORDER — NYSTATIN 100000 [USP'U]/ML
SUSPENSION ORAL
COMMUNITY
Start: 2024-10-07

## 2024-11-06 DIAGNOSIS — D50.0 IRON DEFICIENCY ANEMIA DUE TO CHRONIC BLOOD LOSS: Primary | ICD-10-CM

## 2024-11-06 LAB
ESTIMATED AVG GLUCOSE: 163 MG/DL (ref 68–131)
HBA1C MFR BLD: 7.3 % (ref 4–5.6)

## 2024-11-06 RX ORDER — FERROUS SULFATE 325(65) MG
325 TABLET ORAL 2 TIMES DAILY
Qty: 60 TABLET | Refills: 3 | Status: SHIPPED | OUTPATIENT
Start: 2024-11-06

## 2024-11-12 ENCOUNTER — PATIENT OUTREACH (OUTPATIENT)
Dept: ADMINISTRATIVE | Facility: HOSPITAL | Age: 64
End: 2024-11-12
Payer: COMMERCIAL

## 2024-11-12 ENCOUNTER — OFFICE VISIT (OUTPATIENT)
Dept: DIABETES | Facility: CLINIC | Age: 64
End: 2024-11-12
Payer: COMMERCIAL

## 2024-11-12 DIAGNOSIS — Z93.1 S/P PERCUTANEOUS ENDOSCOPIC GASTROSTOMY (PEG) TUBE PLACEMENT: ICD-10-CM

## 2024-11-12 DIAGNOSIS — G72.0 STATIN MYOPATHY: ICD-10-CM

## 2024-11-12 DIAGNOSIS — T46.6X5A STATIN MYOPATHY: ICD-10-CM

## 2024-11-12 DIAGNOSIS — E78.5 HYPERLIPIDEMIA, UNSPECIFIED HYPERLIPIDEMIA TYPE: ICD-10-CM

## 2024-11-12 DIAGNOSIS — Z79.4 TYPE 2 DIABETES MELLITUS WITH HYPERGLYCEMIA, WITH LONG-TERM CURRENT USE OF INSULIN: Primary | ICD-10-CM

## 2024-11-12 DIAGNOSIS — Z98.890 HISTORY OF THYROID SURGERY: ICD-10-CM

## 2024-11-12 DIAGNOSIS — E11.59 HYPERTENSION ASSOCIATED WITH DIABETES: ICD-10-CM

## 2024-11-12 DIAGNOSIS — I15.2 HYPERTENSION ASSOCIATED WITH DIABETES: ICD-10-CM

## 2024-11-12 DIAGNOSIS — E11.65 TYPE 2 DIABETES MELLITUS WITH HYPERGLYCEMIA, WITH LONG-TERM CURRENT USE OF INSULIN: Primary | ICD-10-CM

## 2024-11-12 PROCEDURE — 3066F NEPHROPATHY DOC TX: CPT | Mod: CPTII,93,, | Performed by: PHYSICIAN ASSISTANT

## 2024-11-12 PROCEDURE — 99214 OFFICE O/P EST MOD 30 MIN: CPT | Mod: 93,,, | Performed by: PHYSICIAN ASSISTANT

## 2024-11-12 PROCEDURE — 3051F HG A1C>EQUAL 7.0%<8.0%: CPT | Mod: CPTII,93,, | Performed by: PHYSICIAN ASSISTANT

## 2024-11-12 PROCEDURE — 95251 CONT GLUC MNTR ANALYSIS I&R: CPT | Mod: NDTC,,, | Performed by: PHYSICIAN ASSISTANT

## 2024-11-12 PROCEDURE — 3061F NEG MICROALBUMINURIA REV: CPT | Mod: CPTII,93,, | Performed by: PHYSICIAN ASSISTANT

## 2024-11-12 NOTE — PROGRESS NOTES
C OUTREACH: per chart review pt is overdue for DM eye exam, at the time of this encounter pt had a Virtual appt she had checked in for, will follow up 3mo, pt has a Surgery scheduled on tomorrow.

## 2024-11-12 NOTE — PROGRESS NOTES
PCP: Rosangela Cat MD    Subjective:     Chief Complaint: Diabetes - Established Patient    Established Patient - Audio Only Telehealth Visit     The patient location is: Home  The chief complaint leading to consultation is: Diabetes follow up  Visit type: Virtual visit with audio only (telephone)  Total Time Spent with Patient: 10 minutes     The reason for the audio only service rather than synchronous audio and video virtual visit was related to technical difficulties or patient preference/necessity.     Each patient to whom I provide medical services by telemedicine is:  (1) informed of the relationship between the physician and patient and the respective role of any other health care provider with respect to management of the patient; and (2) notified that they may decline to receive medical services by telemedicine and may withdraw from such care at any time. Patient verbally consented to receive this service via voice-only telephone call.    This service was not originating from a related E/M service provided within the previous 7 days nor will  to an E/M service or procedure within the next 24 hours or my soonest available appointment.  Prevailing standard of care was able to be met in this audio-only visit.      HISTORY OF PRESENT ILLNESS: 64 y.o.   female presenting for diabetes management visit.   The patient's last visit with me was on 10/22/2024.   Patient has had diabetes with past history of low C peptide since 10 years ago.   Pertinent to decision making is the following comorbidities: HTN, HLD and Multinodular goiter s/p thyroid surgery, GENTRY on CPAP, Statin Intolerance, and Medical Noncompliance  Patient has the following Diabetes complications: without complications  She  has attended diabetes education in the past.      Patient's most recent A1c of 7.3% was completed 1 weeks ago.   Patient states since Her last A1c Her blood glucose levels have been high  after meals.    Patient monitors blood glucose 4 times per day and Continuously with personal CGM Dexcom.   Patient blood glucose monitoring device will be uploaded into Media Section today.          Patient reports shows baseline euglycemia with some mild postrprandial hyperglycemia following peg tube feedings. Was cleared for soft diet but has to restart Peg tube after repeat surgery tomorrow.     Patient endorses the following diabetes related symptoms:  Depression and Anxiety . Previously seeing therapy and psychiatry. Has history of medication side effects with depression meds including agitation and SI. Did recently try group therapy but did not like it. Plans to try some individual therapy.   Patient is due today for the following diabetes-related health maintenance standards: Eye Exam, Influenza Vaccine, COVID-19 Vaccine , and RSV Vaccine .   She voices recent hospital admissions or emergency room visits for jaw reconstruction surgery on 08/26/24. Pt will need thirs surgery due to skin breakdown tomorrow.   She denies having hypoglycemia.  Patient's concerns today include glycemic control.   Patient medication regimen is as below. Will have Peg tube for longer due to repeat surgery.     CURRENT DM MEDICATIONS:   Novolog every 3 hours as needed   Novolog 2-3 units before meals -not taking                 To Correct Blood Sugar (TO add to meal time dose OR to be used as correction after 3 hours if not eating:    - 180: +2 units   - 200: +3 units    - 220: +4 units   - 240: +5 units   - 260: +6 units   - 280: +7 units   - 300: +8 units   - 320: +9 units   - 340: +10 units   - 380: +11 units   - 400: +12 units  BG +400: +13 units - MAX    Patient has failed the following Diabetes medications:   Glucovance   Basaglar       Labs Reviewed.       Lab Results   Component Value Date    CPEPTIDE 1.47 07/05/2024     Lab Results   Component Value Date    GLUTAMICACID  0.00 04/10/2023          //   , There is no height or weight on file to calculate BMI.  Her blood sugar in clinic today is:    Lab Results   Component Value Date    POCGLU 171 (A) 07/08/2024       Review of Systems   Constitutional:  Negative for activity change, appetite change, chills and fever.   HENT:  Negative for dental problem, mouth sores, nosebleeds, sore throat and trouble swallowing.    Eyes:  Negative for pain and discharge.   Respiratory:  Negative for shortness of breath, wheezing and stridor.    Cardiovascular:  Negative for chest pain, palpitations and leg swelling.   Gastrointestinal:  Negative for abdominal pain, diarrhea, nausea and vomiting.   Endocrine: Negative for polydipsia, polyphagia and polyuria.   Genitourinary:  Negative for dysuria, frequency and urgency.   Musculoskeletal:  Negative for joint swelling and myalgias.   Skin:  Negative for rash and wound.   Neurological:  Negative for dizziness, syncope, weakness and headaches.   Psychiatric/Behavioral:  Negative for behavioral problems and dysphoric mood.          Diabetes Management Status  Statin: Not taking  ACE/ARB: Taking    Screening or Prevention Patient's value Goal Complete/Controlled?   HgA1C Testing and Control   Lab Results   Component Value Date    HGBA1C 7.4 (H) 11/05/2024    HGBA1C 7.3 (H) 11/05/2024      Annually/Less than 8% No   Lipid profile : 08/24/2024 Annually Yes   LDL control Lab Results   Component Value Date    LDLCALC 112 08/24/2024    Annually/Less than 100 mg/dl  No   Nephropathy screening Lab Results   Component Value Date    MICALBCREAT 12.8 11/05/2024     Lab Results   Component Value Date    PROTEINUA Negative 11/05/2024    Annually Yes   Blood pressure BP Readings from Last 1 Encounters:   11/05/24 138/86    Less than 140/90 Yes   Dilated retinal exam : 01/16/2017 Annually No    Foot exam   : 07/17/2024 Annually No     Social History     Socioeconomic History    Marital status:     Number of  children: 3   Occupational History     Employer: [employer]   Tobacco Use    Smoking status: Former     Current packs/day: 0.00     Average packs/day: 1 pack/day for 28.0 years (28.0 ttl pk-yrs)     Types: Cigarettes     Start date:      Quit date: 2007     Years since quittin.8    Smokeless tobacco: Former    Tobacco comments:     smoked off and on from 1249-1281 about 15 years total years smoking.   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     Social Drivers of Health     Financial Resource Strain: Low Risk  (2024)    [facility]    Overall Financial Resource Strain (CARDIA)     Difficulty of Paying Living Expenses: Not hard at all   Food Insecurity: Patient Declined (2024)    Received from [facility] and Its Subsidiaries and Affiliates    Hunger Vital Sign     Worried About Running Out of Food in the Last Year: Patient declined     Ran Out of Food in the Last Year: Patient declined   Transportation Needs: Patient Declined (2024)    Received from [facility] and Its Subsidiaries and Affiliates    PRAPARE - Transportation     Lack of Transportation (Medical): Patient declined     Lack of Transportation (Non-Medical): Patient declined   Physical Activity: Inactive (2024)    Received from [facility] and Its Subsidiaries and Affiliates    Exercise Vital Sign     Days of Exercise per Week: 0 days     Minutes of Exercise per Session: 0 min   Stress: No Stress Concern Present (2024)    Received from [facility] and Its  Subsidiaries and Affiliates    [place] [employer] - Occupational Stress Questionnaire     Feeling of Stress : Not at all   Housing Stability: Patient Declined (11/8/2024)    Received from [facility] Its Subsidiaries and Affiliates    Housing Stability Vital Sign     Unable to Pay for Housing in the Last Year: Patient declined     Number of Times Moved in the Last Year: 0     Homeless in the Last Year: Patient declined     Past Medical History:   Diagnosis Date    Abnormal Pap smear of vagina     Arthritis     Left shoulder    Diabetes mellitus type II     Dr Ayala manages DM     Hyperlipidemia     Hypertension     Thyroid disease     Dr Torres marrioneaux- goitre+       Objective:        Physical Exam  Constitutional:       General: She is not in acute distress.     Appearance: She is well-developed. She is not diaphoretic.   HENT:      Head: Normocephalic and atraumatic.      Right Ear: External ear normal.      Left Ear: External ear normal.      Nose: Nose normal.   Eyes:      General:         Right eye: No discharge.         Left eye: No discharge.   Pulmonary:      Effort: Pulmonary effort is normal. No respiratory distress.      Breath sounds: No stridor.   Musculoskeletal:         General: Normal range of motion.      Cervical back: Normal range of motion.   Skin:     Coloration: Skin is not pale.   Neurological:      Mental Status: She is alert and oriented to person, place, and time. Mental status is at baseline.      Motor: No abnormal muscle tone.      Coordination: Coordination normal.   Psychiatric:         Mood and Affect: Mood normal.         Behavior: Behavior normal.         Thought Content: Thought content normal.         Judgment: Judgment normal.           Assessment / Plan:     Type 2 diabetes mellitus with hyperglycemia, with long-term current use of insulin    Hypertension associated with diabetes    Hyperlipidemia, unspecified  hyperlipidemia type    S/P percutaneous endoscopic gastrostomy (PEG) tube placement    History of thyroid surgery    Statin myopathy          Additional Plan Details:    - POCT Glucose  - Encouraged continuation of lifestyle changes including regular exercise and limiting carbohydrates to 30-45 grams per meal threes times daily and 15 grams per snack with a limit of two daily.   - Encouraged continued monitoring of blood glucose with maintenance of 4 times daily and Continuously with personal CGM Dexcom.    - Current DM Medication Regimen: Change Novolog to 2 units before meals and correction dosing per scale below.  Hold Ozempic with current Peg tube.  - Nonfasting labs sched  - Health Maintenance standards addressed today: Eye Exam - will be completed outside of [facility] and patient will schedule, Flu Shot - patient would like to complete outside of [facility], COVID - 19 Vaccine - patient will schedule outside of [facility] , and RSV vaccine  - Nursing Visit: Patient is age 79 or younger with an A1c of 7.5 or greater and will not need nursing visit at this time .   - Follow up in 2 wks or sooner for post op visit.    CURRENT DM MEDICATIONS:   Novolog every 3 hours as needed   Novolog 2 units before meals - dose 10 mins before meal                 To Correct Blood Sugar (TO add to meal time dose OR to be used as correction after 3 hours if not eating:    - 180: +2 units   - 200: +3 units    - 220: +4 units   - 240: +5 units   - 260: +6 units   - 280: +7 units   - 300: +8 units   - 320: +9 units   - 340: +10 units   - 380: +11 units   - 400: +12 units  BG +400: +13 units - MAX Blakeney McKnight, PA-C [facility]

## 2024-11-12 NOTE — PATIENT INSTRUCTIONS
CURRENT DM MEDICATIONS:   Novolog every 3 hours as needed   Novolog 2 units before meals - dose 10 mins before meal                 To Correct Blood Sugar (TO add to meal time dose OR to be used as correction after 3 hours if not eating:    - 180: +2 units   - 200: +3 units    - 220: +4 units   - 240: +5 units   - 260: +6 units   - 280: +7 units   - 300: +8 units   - 320: +9 units   - 340: +10 units   - 380: +11 units   - 400: +12 units  BG +400: +13 units - MAX

## 2024-11-25 ENCOUNTER — OFFICE VISIT (OUTPATIENT)
Dept: DIABETES | Facility: CLINIC | Age: 64
End: 2024-11-25
Payer: COMMERCIAL

## 2024-11-25 DIAGNOSIS — T46.6X5A STATIN MYOPATHY: ICD-10-CM

## 2024-11-25 DIAGNOSIS — I15.2 HYPERTENSION ASSOCIATED WITH DIABETES: ICD-10-CM

## 2024-11-25 DIAGNOSIS — Z79.4 TYPE 2 DIABETES MELLITUS WITH HYPERGLYCEMIA, WITH LONG-TERM CURRENT USE OF INSULIN: ICD-10-CM

## 2024-11-25 DIAGNOSIS — Z93.1 S/P PERCUTANEOUS ENDOSCOPIC GASTROSTOMY (PEG) TUBE PLACEMENT: ICD-10-CM

## 2024-11-25 DIAGNOSIS — E11.65 TYPE 2 DIABETES MELLITUS WITH HYPERGLYCEMIA, WITH LONG-TERM CURRENT USE OF INSULIN: ICD-10-CM

## 2024-11-25 DIAGNOSIS — Z53.21 PATIENT LEFT WITHOUT BEING SEEN: Primary | ICD-10-CM

## 2024-11-25 DIAGNOSIS — E78.5 HYPERLIPIDEMIA, UNSPECIFIED HYPERLIPIDEMIA TYPE: ICD-10-CM

## 2024-11-25 DIAGNOSIS — G72.0 STATIN MYOPATHY: ICD-10-CM

## 2024-11-25 DIAGNOSIS — E11.59 HYPERTENSION ASSOCIATED WITH DIABETES: ICD-10-CM

## 2024-11-25 PROCEDURE — 99499 UNLISTED E&M SERVICE: CPT | Mod: 95,,, | Performed by: PHYSICIAN ASSISTANT

## 2024-11-25 NOTE — PROGRESS NOTES
Call to check on patient post surgery.  Patient is still currently inpatient status due to post surgery complications however will be discharged this afternoon.  We will reschedule visit to tomorrow afternoon after resuming home insulin regimen.  No level of service today    Bulmaro Aguilera PA-C, BC-ADM  [facility]

## 2024-11-26 ENCOUNTER — OFFICE VISIT (OUTPATIENT)
Dept: DIABETES | Facility: CLINIC | Age: 64
End: 2024-11-26
Payer: COMMERCIAL

## 2024-11-26 DIAGNOSIS — E78.5 HYPERLIPIDEMIA, UNSPECIFIED HYPERLIPIDEMIA TYPE: ICD-10-CM

## 2024-11-26 DIAGNOSIS — E11.65 TYPE 2 DIABETES MELLITUS WITH HYPERGLYCEMIA, WITH LONG-TERM CURRENT USE OF INSULIN: Primary | ICD-10-CM

## 2024-11-26 DIAGNOSIS — Z93.1 S/P PERCUTANEOUS ENDOSCOPIC GASTROSTOMY (PEG) TUBE PLACEMENT: ICD-10-CM

## 2024-11-26 DIAGNOSIS — Z79.4 TYPE 2 DIABETES MELLITUS WITH HYPERGLYCEMIA, WITH LONG-TERM CURRENT USE OF INSULIN: Primary | ICD-10-CM

## 2024-11-26 DIAGNOSIS — I15.2 HYPERTENSION ASSOCIATED WITH DIABETES: ICD-10-CM

## 2024-11-26 DIAGNOSIS — E11.59 HYPERTENSION ASSOCIATED WITH DIABETES: ICD-10-CM

## 2024-11-26 RX ORDER — LANCETS
EACH MISCELLANEOUS
Qty: 100 EACH | Refills: 11 | Status: SHIPPED | OUTPATIENT
Start: 2024-11-26

## 2024-11-26 RX ORDER — INSULIN PUMP SYRINGE, 3 ML
EACH MISCELLANEOUS
Qty: 1 EACH | Refills: 0 | Status: SHIPPED | OUTPATIENT
Start: 2024-11-26 | End: 2025-11-26

## 2024-11-26 NOTE — PATIENT INSTRUCTIONS
CURRENT DM MEDICATIONS:   Novolog every 3 hours as needed   Novolog IC 15 (or 1 unit for every 15g of carbs)- dose 10 mins before meal                 To Correct Blood Sugar (TO add to meal time dose OR to be used as correction after 3 hours if not eating:    - 180: +2 units   - 200: +3 units    - 220: +4 units   - 240: +5 units   - 260: +6 units   - 280: +7 units   - 300: +8 units   - 320: +9 units   - 340: +10 units   - 380: +11 units   - 400: +12 units  BG +400: +13 units - MAX

## 2024-11-26 NOTE — PROGRESS NOTES
PCP: Rosangela Cat MD    Subjective:     Chief Complaint: Diabetes - Established Patient    TELEMEDICINE VISIT:     The patient location is: Home  The chief complaint leading to consultation is: Diabetes Follow up  Visit type: Virtual visit with synchronous audio and video  Total time spent with patient: 11  Each patient to whom he or she provides medical services by telemedicine is:  (1) informed of the relationship between the physician and patient and the respective role of any other health care provider with respect to management of the patient; and (2) notified that he or she may decline to receive medical services by telemedicine and may withdraw from such care at any time.    Notes: N/A     HISTORY OF PRESENT ILLNESS: 64 y.o.   female presenting for diabetes management visit.   The patient's last visit with me was on 11/25/2024.    Patient has had diabetes with past history of low C peptide since 10 years ago.   Pertinent to decision making is the following comorbidities: HTN, HLD and Multinodular goiter s/p thyroid surgery, GENTRY on CPAP, Statin Intolerance, and Medical Noncompliance  Patient has the following Diabetes complications: without complications  She  has attended diabetes education in the past.      Patient's most recent A1c of 7.3% was completed 1 weeks ago.   Patient states since Her last A1c Her blood glucose levels have been high  after meals.   Patient monitors blood glucose 4 times per day and Continuously with personal CGM Dexcom.   Patient blood glucose monitoring device will be uploaded into Media Section today.          Patient reports shows baseline euglycemia with postrprandial hyperglycemia following peg tube feedings. w.     Patient endorses the following diabetes related symptoms:  Depression and Anxiety . Previously seeing therapy and psychiatry. Has history of medication side effects with depression meds including agitation and SI. Did recently try group therapy but  did not like it. Plans to try some individual therapy.   Patient is due today for the following diabetes-related health maintenance standards: Eye Exam, Influenza Vaccine, COVID-19 Vaccine , and RSV Vaccine .   She voices recent hospital admissions or emergency room visits for jaw reconstruction surgery on 08/26/24. Pt will need thirs surgery due to skin breakdown tomorrow.   She denies having hypoglycemia.  Patient's concerns today include glycemic control.   Patient medication regimen is as below. Will have Peg tube for longer due to repeat surgery.     CURRENT DM MEDICATIONS:   Novolog every 3 hours as needed   Novolog 2-3 units before meals -not taking                 To Correct Blood Sugar (TO add to meal time dose OR to be used as correction after 3 hours if not eating:    - 180: +2 units   - 200: +3 units    - 220: +4 units   - 240: +5 units   - 260: +6 units   - 280: +7 units   - 300: +8 units   - 320: +9 units   - 340: +10 units   - 380: +11 units   - 400: +12 units  BG +400: +13 units - MAX    Patient has failed the following Diabetes medications:   Glucovance   Basaglar       Labs Reviewed.       Lab Results   Component Value Date    CPEPTIDE 1.47 07/05/2024     Lab Results   Component Value Date    GLUTAMICACID 0.00 04/10/2023          //   , There is no height or weight on file to calculate BMI.  Her blood sugar in clinic today is:    Lab Results   Component Value Date    POCGLU 171 (A) 07/08/2024       Review of Systems   Constitutional:  Negative for activity change, appetite change, chills and fever.   HENT:  Negative for dental problem, mouth sores, nosebleeds, sore throat and trouble swallowing.    Eyes:  Negative for pain and discharge.   Respiratory:  Negative for shortness of breath, wheezing and stridor.    Cardiovascular:  Negative for chest pain, palpitations and leg swelling.   Gastrointestinal:  Negative for abdominal pain,  diarrhea, nausea and vomiting.   Endocrine: Negative for polydipsia, polyphagia and polyuria.   Genitourinary:  Negative for dysuria, frequency and urgency.   Musculoskeletal:  Negative for joint swelling and myalgias.   Skin:  Negative for rash and wound.   Neurological:  Negative for dizziness, syncope, weakness and headaches.   Psychiatric/Behavioral:  Negative for behavioral problems and dysphoric mood.          Diabetes Management Status  Statin: Not taking  ACE/ARB: Taking    Screening or Prevention Patient's value Goal Complete/Controlled?   HgA1C Testing and Control   Lab Results   Component Value Date    HGBA1C 7.4 (H) 2024    HGBA1C 7.3 (H) 2024      Annually/Less than 8% No   Lipid profile : 2024 Annually Yes   LDL control Lab Results   Component Value Date    LDLCALC 112 2024    Annually/Less than 100 mg/dl  No   Nephropathy screening Lab Results   Component Value Date    MICALBCREAT 12.8 2024     Lab Results   Component Value Date    PROTEINUA Negative 2024    Annually Yes   Blood pressure BP Readings from Last 1 Encounters:   24 138/86    Less than 140/90 Yes   Dilated retinal exam : 2017 Annually No    Foot exam   : 2024 Annually No     Social History     Socioeconomic History    Marital status:     Number of children: 3   Occupational History     Employer: [employer]   Tobacco Use    Smoking status: Former     Current packs/day: 0.00     Average packs/day: 1 pack/day for 28.0 years (28.0 ttl pk-yrs)     Types: Cigarettes     Start date:      Quit date: 2007     Years since quittin.8    Smokeless tobacco: Former    Tobacco comments:     smoked off and on from 1591-1203 about 15 years total years smoking.   Substance and Sexual Activity    Alcohol use: Yes     Alcohol/week: 21.0 standard drinks of alcohol     Types: 21 Glasses of wine per week    Drug use: No    Sexual activity: Yes     Partners: Male   Social  History Narrative    , 3 kids, [employer].     Social Drivers of Health     Financial Resource Strain: Low Risk  (8/20/2024)    [facility]    Overall Financial Resource Strain (CARDIA)     Difficulty of Paying Living Expenses: Not hard at all   Food Insecurity: Patient Declined (11/19/2024)    Received from [facility] Its Subsidiaries and Affiliates    Hunger Vital Sign     Worried About Running Out of Food in the Last Year: Patient declined     Ran Out of Food in the Last Year: Patient declined   Transportation Needs: No Transportation Needs (11/19/2024)    Received [facility]    PRAPARE - Transportation     Lack of Transportation (Medical): No     Lack of Transportation (Non-Medical): No   Physical Activity: Inactive (8/20/2024)    Received [facility]    Exercise Vital Sign     Days of Exercise per Week: 0 days     Minutes of Exercise per Session: 0 min   Stress: No Stress Concern Present (8/20/2024)    Received from [facility] Its Subsidiaries and Affiliates    [place] [employer] - Occupational Stress Questionnaire     Feeling of Stress : Not at all   Housing Stability: Low Risk  (11/19/2024)    Received from [facility] Its Subsidiaries and Affiliates    Housing Stability Vital Sign     Unable to Pay for Housing in the Last Year: No     Number of Times Moved in the Last Year: 0     Homeless in the Last Year: No     Past Medical History:   Diagnosis Date    Abnormal Pap smear of vagina     Arthritis     Left shoulder    Diabetes mellitus type II     Dr Ayala manages DM     Hyperlipidemia     Hypertension     Thyroid  disease     Dr Melissa garcia- goitre+       Objective:        Physical Exam  Constitutional:       General: She is not in acute distress.     Appearance: She is well-developed. She is not diaphoretic.   HENT:      Head: Normocephalic and atraumatic.      Right Ear: External ear normal.      Left Ear: External ear normal.      Nose: Nose normal.   Eyes:      General:         Right eye: No discharge.         Left eye: No discharge.   Pulmonary:      Effort: Pulmonary effort is normal. No respiratory distress.      Breath sounds: No stridor.   Musculoskeletal:         General: Normal range of motion.      Cervical back: Normal range of motion.   Skin:     Coloration: Skin is not pale.   Neurological:      Mental Status: She is alert and oriented to person, place, and time. Mental status is at baseline.      Motor: No abnormal muscle tone.      Coordination: Coordination normal.   Psychiatric:         Mood and Affect: Mood normal.         Behavior: Behavior normal.         Thought Content: Thought content normal.         Judgment: Judgment normal.           Assessment / Plan:     Type 2 diabetes mellitus with hyperglycemia, with long-term current use of insulin  -     blood-glucose meter kit; To check BG 3 times daily, to use with insurance preferred meter  Dispense: 1 each; Refill: 0  -     lancets Misc; To check BG 3 times daily, to use with insurance preferred meter  Dispense: 100 each; Refill: 11  -     blood sugar diagnostic Strp; To check BG 3 times daily, to use with insurance preferred meter  Dispense: 100 each; Refill: 11    Hypertension associated with diabetes    Hyperlipidemia, unspecified hyperlipidemia type    S/P percutaneous endoscopic gastrostomy (PEG) tube placement          Additional Plan Details:    - POCT Glucose  - Encouraged continuation of lifestyle changes including regular exercise and limiting carbohydrates to 30-45 grams per meal threes times daily and 15 grams per snack with a limit of  two daily.   - Encouraged continued monitoring of blood glucose with maintenance of 4 times daily and Continuously with personal CGM Dexcom. Insurance preferred Meter and supplies Rx today.   - Current DM Medication Regimen: Change Novolog to IC 15 (or 1 unit for every 15g of carb) and correction dosing per scale below.  Hold Ozempic with current Peg tube.  - Health Maintenance standards addressed today: Eye Exam - will be completed outside of [facility] and patient will schedule, Flu Shot - patient would like to complete outside of [facility], COVID - 19 Vaccine - patient will schedule outside of [facility] , and RSV vaccine  - Nursing Visit: Patient is age 79 or younger with an A1c of 7.5 or greater and will not need nursing visit at this time .   - Follow up in 1 wks.    CURRENT DM MEDICATIONS:   Novolog every 3 hours as needed   Novolog IC 15 (or 1 unit for every 15g of carbs)- dose 10 mins before meal                 To Correct Blood Sugar (TO add to meal time dose OR to be used as correction after 3 hours if not eating:    - 180: +2 units   - 200: +3 units    - 220: +4 units   - 240: +5 units   - 260: +6 units   - 280: +7 units   - 300: +8 units   - 320: +9 units   - 340: +10 units   - 380: +11 units   - 400: +12 units  BG +400: +13 units - MAX Blakeney McKnight, PA-C [facility]

## 2024-12-03 ENCOUNTER — OFFICE VISIT (OUTPATIENT)
Dept: DIABETES | Facility: CLINIC | Age: 64
End: 2024-12-03
Payer: COMMERCIAL

## 2024-12-03 DIAGNOSIS — I15.2 HYPERTENSION ASSOCIATED WITH DIABETES: ICD-10-CM

## 2024-12-03 DIAGNOSIS — Z79.4 TYPE 2 DIABETES MELLITUS WITH HYPERGLYCEMIA, WITH LONG-TERM CURRENT USE OF INSULIN: Primary | ICD-10-CM

## 2024-12-03 DIAGNOSIS — E78.5 HYPERLIPIDEMIA, UNSPECIFIED HYPERLIPIDEMIA TYPE: ICD-10-CM

## 2024-12-03 DIAGNOSIS — E11.65 TYPE 2 DIABETES MELLITUS WITH HYPERGLYCEMIA, WITH LONG-TERM CURRENT USE OF INSULIN: Primary | ICD-10-CM

## 2024-12-03 DIAGNOSIS — Z93.1 S/P PERCUTANEOUS ENDOSCOPIC GASTROSTOMY (PEG) TUBE PLACEMENT: ICD-10-CM

## 2024-12-03 DIAGNOSIS — E11.59 HYPERTENSION ASSOCIATED WITH DIABETES: ICD-10-CM

## 2024-12-03 PROCEDURE — G2211 COMPLEX E/M VISIT ADD ON: HCPCS | Mod: 95,,, | Performed by: PHYSICIAN ASSISTANT

## 2024-12-03 PROCEDURE — 95251 CONT GLUC MNTR ANALYSIS I&R: CPT | Mod: NDTC,,, | Performed by: PHYSICIAN ASSISTANT

## 2024-12-03 PROCEDURE — 99213 OFFICE O/P EST LOW 20 MIN: CPT | Mod: 95,,, | Performed by: PHYSICIAN ASSISTANT

## 2024-12-03 PROCEDURE — 3066F NEPHROPATHY DOC TX: CPT | Mod: CPTII,95,, | Performed by: PHYSICIAN ASSISTANT

## 2024-12-03 PROCEDURE — 3051F HG A1C>EQUAL 7.0%<8.0%: CPT | Mod: CPTII,95,, | Performed by: PHYSICIAN ASSISTANT

## 2024-12-03 PROCEDURE — 3061F NEG MICROALBUMINURIA REV: CPT | Mod: CPTII,95,, | Performed by: PHYSICIAN ASSISTANT

## 2024-12-03 NOTE — PROGRESS NOTES
PCP: Rosangela Cat MD    Subjective:     Chief Complaint: Diabetes - Established Patient    TELEMEDICINE VISIT:     The patient location is: Home  The chief complaint leading to consultation is: Diabetes Follow up  Visit type: Virtual visit with synchronous audio and video  Total time spent with patient: 5  Each patient to whom he or she provides medical services by telemedicine is:  (1) informed of the relationship between the physician and patient and the respective role of any other health care provider with respect to management of the patient; and (2) notified that he or she may decline to receive medical services by telemedicine and may withdraw from such care at any time.    Notes: N/A     HISTORY OF PRESENT ILLNESS: 64 y.o.   female presenting for diabetes management visit.   The patient's last visit with me was on 11/26/2024.    Patient has had diabetes with past history of low C peptide since 10 years ago.   Pertinent to decision making is the following comorbidities: HTN, HLD and Multinodular goiter s/p thyroid surgery, GENTRY on CPAP, Statin Intolerance, and Medical Noncompliance  Patient has the following Diabetes complications: without complications  She  has attended diabetes education in the past.      Patient's most recent A1c of 7.3% was completed 2 weeks ago.   Patient states since Her last A1c Her blood glucose levels have been high  after meals.   Patient monitors blood glucose 4 times per day and Continuously with personal CGM Dexcom.   Patient blood glucose monitoring device will be uploaded into [employer] today.          Patient reports shows baseline euglycemia with improved postprandial coverage following peg tube feedings with IC of 10. Starting to supplement feedings with food.     Patient endorses the following diabetes related symptoms:  Depression and Anxiety . Previously seeing therapy and psychiatry. Has history of medication side effects with depression meds  including agitation and SI. Did recently try group therapy but did not like it. Plans to try some individual therapy.   Patient is due today for the following diabetes-related health maintenance standards: Eye Exam, Influenza Vaccine, COVID-19 Vaccine , and RSV Vaccine .   She voices recent hospital admissions or emergency room visits for jaw reconstruction surgery on 08/26/24. Pt will need thirs surgery due to skin breakdown tomorrow.   She denies having hypoglycemia.  Patient's concerns today include glycemic control.   Patient medication regimen is as below. Will have Peg tube for longer due to repeat surgery.     CURRENT DM MEDICATIONS:   Novolog every 3 hours as needed   Novolog IC 10 (or 1 unit for every 10g of carbs)- dose 10 mins before meal                 To Correct Blood Sugar (TO add to meal time dose OR to be used as correction after 3 hours if not eating:    - 180: +2 units   - 200: +3 units    - 220: +4 units   - 240: +5 units   - 260: +6 units   - 280: +7 units   - 300: +8 units   - 320: +9 units   - 340: +10 units   - 380: +11 units   - 400: +12 units  BG +400: +13 units - MAX    Patient has failed the following Diabetes medications:   Glucovance   Basaglar       Labs Reviewed.       Lab Results   Component Value Date    CPEPTIDE 1.47 07/05/2024     Lab Results   Component Value Date    GLUTAMICACID 0.00 04/10/2023          //   , There is no height or weight on file to calculate BMI.  Her blood sugar in clinic today is:    Lab Results   Component Value Date    POCGLU 171 (A) 07/08/2024       Review of Systems   Constitutional:  Negative for activity change, appetite change, chills and fever.   HENT:  Negative for dental problem, mouth sores, nosebleeds, sore throat and trouble swallowing.    Eyes:  Negative for pain and discharge.   Respiratory:  Negative for shortness of breath, wheezing and stridor.    Cardiovascular:  Negative for  chest pain, palpitations and leg swelling.   Gastrointestinal:  Negative for abdominal pain, diarrhea, nausea and vomiting.   Endocrine: Negative for polydipsia, polyphagia and polyuria.   Genitourinary:  Negative for dysuria, frequency and urgency.   Musculoskeletal:  Negative for joint swelling and myalgias.   Skin:  Negative for rash and wound.   Neurological:  Negative for dizziness, syncope, weakness and headaches.   Psychiatric/Behavioral:  Negative for behavioral problems and dysphoric mood.          Diabetes Management Status  Statin: Not taking  ACE/ARB: Taking    Screening or Prevention Patient's value Goal Complete/Controlled?   HgA1C Testing and Control   Lab Results   Component Value Date    HGBA1C 7.4 (H) 2024    HGBA1C 7.3 (H) 2024      Annually/Less than 8% No   Lipid profile : 2024 Annually Yes   LDL control Lab Results   Component Value Date    LDLCALC 112 2024    Annually/Less than 100 mg/dl  No   Nephropathy screening Lab Results   Component Value Date    MICALBCREAT 12.8 2024     Lab Results   Component Value Date    PROTEINUA Negative 2024    Annually Yes   Blood pressure BP Readings from Last 1 Encounters:   24 138/86    Less than 140/90 Yes   Dilated retinal exam : 2017 Annually No    Foot exam   : 2024 Annually No     Social History     Socioeconomic History    Marital status:     Number of children: 3   Occupational History     Employer: [employer]   Tobacco Use    Smoking status: Former     Current packs/day: 0.00     Average packs/day: 1 pack/day for 28.0 years (28.0 ttl pk-yrs)     Types: Cigarettes     Start date:      Quit date: 2007     Years since quittin.9    Smokeless tobacco: Former    Tobacco comments:     smoked off and on from 4630-2903 about 15 years total years smoking.   Substance and Sexual Activity    Alcohol use: Yes     Alcohol/week: 21.0 standard drinks of alcohol     Types: 21  Glasses of wine per week    Drug use: No    Sexual activity: Yes     Partners: Male   Social History Narrative    , 3 kids, [employer].     Social Drivers of Health     Financial Resource Strain: Low Risk  (8/20/2024)    [facility]    Overall Financial Resource Strain (CARDIA)     Difficulty of Paying Living Expenses: Not hard at all   Food Insecurity: Patient Declined (11/19/2024)    [facility]    Hunger Vital Sign     Worried About Running Out of Food in the Last Year: Patient declined     Ran Out of Food in the Last Year: Patient declined   Transportation Needs: No Transportation Needs (11/19/2024)    [facility]    PRAPARE - Transportation     Lack of Transportation (Medical): No     Lack of Transportation (Non-Medical): No   Physical Activity: Inactive (8/20/2024)    [facility]    Exercise Vital Sign     Days of Exercise per Week: 0 days     Minutes of Exercise per Session: 0 min   Stress: No Stress Concern Present (8/20/2024)    Received from [facility]    [place] [employer] - Occupational Stress Questionnaire     Feeling of Stress : Not at all   Housing Stability: Low Risk  (11/19/2024)    [facility]    Housing Stability Vital Sign     Unable to Pay for Housing in the Last Year: No     Number of Times Moved in the Last Year: 0     Homeless in the Last Year: No     Past Medical History:   Diagnosis Date    Abnormal Pap smear of vagina     Arthritis     Left shoulder    Diabetes  mellitus type II     Dr Ayala manages DM     Hyperlipidemia     Hypertension     Thyroid disease     Dr Torres marrioneaux- goitre+       Objective:        Physical Exam  Constitutional:       General: She is not in acute distress.     Appearance: She is well-developed. She is not diaphoretic.   HENT:      Head: Normocephalic and atraumatic.      Right Ear: External ear normal.      Left Ear: External ear normal.      Nose: Nose normal.   Eyes:      General:         Right eye: No discharge.         Left eye: No discharge.   Pulmonary:      Effort: Pulmonary effort is normal. No respiratory distress.      Breath sounds: No stridor.   Musculoskeletal:         General: Normal range of motion.      Cervical back: Normal range of motion.   Skin:     Coloration: Skin is not pale.   Neurological:      Mental Status: She is alert and oriented to person, place, and time. Mental status is at baseline.      Motor: No abnormal muscle tone.      Coordination: Coordination normal.   Psychiatric:         Mood and Affect: Mood normal.         Behavior: Behavior normal.         Thought Content: Thought content normal.         Judgment: Judgment normal.           Assessment / Plan:     Type 2 diabetes mellitus with hyperglycemia, with long-term current use of insulin    Hypertension associated with diabetes    Hyperlipidemia, unspecified hyperlipidemia type    S/P percutaneous endoscopic gastrostomy (PEG) tube placement          Additional Plan Details:    - POCT Glucose  - Encouraged continuation of lifestyle changes including regular exercise and limiting carbohydrates to 30-45 grams per meal threes times daily and 15 grams per snack with a limit of two daily.   - Encouraged continued monitoring of blood glucose with maintenance of 4 times daily and Continuously with personal CGM Dexcom.   - Current DM Medication Regimen: Continue Novolog to IC 10 (or 1 unit for every 10g of carb) and correction dosing per scale below.   - Health  Maintenance standards addressed today: Eye Exam - will be completed outside of [facility] and patient will schedule, Flu Shot - patient would like to complete outside of [facility], COVID - 19 Vaccine - patient will schedule outside of [facility] , and RSV vaccine  - Nursing Visit: Patient is age 79 or younger with an A1c of 7.5 or greater and will not need nursing visit at this time .   - Follow up in 3 wks.    CURRENT DM MEDICATIONS:   Novolog every 3 hours as needed   Novolog IC 10 (or 1 unit for every 10g of carbs)- dose 10 mins before meal                 To Correct Blood Sugar (TO add to meal time dose OR to be used as correction after 3 hours if not eating:    - 180: +2 units   - 200: +3 units    - 220: +4 units   - 240: +5 units   - 260: +6 units   - 280: +7 units   - 300: +8 units   - 320: +9 units   - 340: +10 units   - 380: +11 units   - 400: +12 units  BG +400: +13 units - MAX Blakeney McKnight, PA-C [facility]

## 2024-12-09 DIAGNOSIS — Z98.890 S/P FLAP GRAFT: ICD-10-CM

## 2024-12-09 DIAGNOSIS — Z98.890 STATUS POST CRANIOFACIAL RECONSTRUCTION: ICD-10-CM

## 2024-12-09 DIAGNOSIS — D16.5 AMELOBLASTOMA: Primary | ICD-10-CM

## 2024-12-09 DIAGNOSIS — Z98.890 S/P CRANIOFACIAL RECONSTRUCTION: ICD-10-CM

## 2024-12-17 ENCOUNTER — OFFICE VISIT (OUTPATIENT)
Dept: INTERNAL MEDICINE | Facility: CLINIC | Age: 64
End: 2024-12-17
Payer: COMMERCIAL

## 2024-12-17 VITALS
HEART RATE: 95 BPM | DIASTOLIC BLOOD PRESSURE: 66 MMHG | RESPIRATION RATE: 20 BRPM | BODY MASS INDEX: 18.38 KG/M2 | SYSTOLIC BLOOD PRESSURE: 106 MMHG | TEMPERATURE: 98 F | WEIGHT: 94.13 LBS | OXYGEN SATURATION: 95 %

## 2024-12-17 DIAGNOSIS — Z79.4 TYPE 2 DIABETES MELLITUS WITH HYPERGLYCEMIA, WITH LONG-TERM CURRENT USE OF INSULIN: ICD-10-CM

## 2024-12-17 DIAGNOSIS — E10.59 HYPERTENSION ASSOCIATED WITH TYPE 1 DIABETES MELLITUS: ICD-10-CM

## 2024-12-17 DIAGNOSIS — G50.1 ATYPICAL FACIAL PAIN: ICD-10-CM

## 2024-12-17 DIAGNOSIS — G62.9 NEUROPATHY: Primary | ICD-10-CM

## 2024-12-17 DIAGNOSIS — E11.65 TYPE 2 DIABETES MELLITUS WITH HYPERGLYCEMIA, WITH LONG-TERM CURRENT USE OF INSULIN: ICD-10-CM

## 2024-12-17 DIAGNOSIS — I15.2 HYPERTENSION ASSOCIATED WITH TYPE 1 DIABETES MELLITUS: ICD-10-CM

## 2024-12-17 LAB
ANION GAP SERPL CALC-SCNC: 8 MMOL/L (ref 8–16)
BASOPHILS # BLD AUTO: 0.06 K/UL (ref 0–0.2)
BASOPHILS NFR BLD: 0.9 % (ref 0–1.9)
BUN SERPL-MCNC: 27 MG/DL (ref 8–23)
CALCIUM SERPL-MCNC: 9.8 MG/DL (ref 8.7–10.5)
CHLORIDE SERPL-SCNC: 101 MMOL/L (ref 95–110)
CO2 SERPL-SCNC: 27 MMOL/L (ref 23–29)
CREAT SERPL-MCNC: 0.7 MG/DL (ref 0.5–1.4)
DIFFERENTIAL METHOD BLD: ABNORMAL
EOSINOPHIL # BLD AUTO: 0.3 K/UL (ref 0–0.5)
EOSINOPHIL NFR BLD: 4.5 % (ref 0–8)
ERYTHROCYTE [DISTWIDTH] IN BLOOD BY AUTOMATED COUNT: 14.7 % (ref 11.5–14.5)
EST. GFR  (NO RACE VARIABLE): >60 ML/MIN/1.73 M^2
ESTIMATED AVG GLUCOSE: 120 MG/DL (ref 68–131)
FERRITIN SERPL-MCNC: 1033 NG/ML (ref 20–300)
GLUCOSE SERPL-MCNC: 175 MG/DL (ref 70–110)
HBA1C MFR BLD: 5.8 % (ref 4–5.6)
HCT VFR BLD AUTO: 33.3 % (ref 37–48.5)
HGB BLD-MCNC: 11 G/DL (ref 12–16)
IMM GRANULOCYTES # BLD AUTO: 0.02 K/UL (ref 0–0.04)
IMM GRANULOCYTES NFR BLD AUTO: 0.3 % (ref 0–0.5)
LYMPHOCYTES # BLD AUTO: 1.8 K/UL (ref 1–4.8)
LYMPHOCYTES NFR BLD: 26.2 % (ref 18–48)
MAGNESIUM SERPL-MCNC: 2.1 MG/DL (ref 1.6–2.6)
MCH RBC QN AUTO: 27 PG (ref 27–31)
MCHC RBC AUTO-ENTMCNC: 33 G/DL (ref 32–36)
MCV RBC AUTO: 82 FL (ref 82–98)
MONOCYTES # BLD AUTO: 0.4 K/UL (ref 0.3–1)
MONOCYTES NFR BLD: 6.4 % (ref 4–15)
NEUTROPHILS # BLD AUTO: 4.2 K/UL (ref 1.8–7.7)
NEUTROPHILS NFR BLD: 61.7 % (ref 38–73)
NRBC BLD-RTO: 0 /100 WBC
PLATELET # BLD AUTO: 295 K/UL (ref 150–450)
PMV BLD AUTO: 12.4 FL (ref 9.2–12.9)
POTASSIUM SERPL-SCNC: 4.2 MMOL/L (ref 3.5–5.1)
RBC # BLD AUTO: 4.07 M/UL (ref 4–5.4)
SODIUM SERPL-SCNC: 136 MMOL/L (ref 136–145)
TSH SERPL DL<=0.005 MIU/L-ACNC: 0.88 UIU/ML (ref 0.4–4)
WBC # BLD AUTO: 6.86 K/UL (ref 3.9–12.7)

## 2024-12-17 PROCEDURE — 3074F SYST BP LT 130 MM HG: CPT | Mod: CPTII,S$GLB,, | Performed by: FAMILY MEDICINE

## 2024-12-17 PROCEDURE — 3061F NEG MICROALBUMINURIA REV: CPT | Mod: CPTII,S$GLB,, | Performed by: FAMILY MEDICINE

## 2024-12-17 PROCEDURE — 80048 BASIC METABOLIC PNL TOTAL CA: CPT | Performed by: FAMILY MEDICINE

## 2024-12-17 PROCEDURE — 99999 PR PBB SHADOW E&M-EST. PATIENT-LVL V: CPT | Mod: PBBFAC,,, | Performed by: FAMILY MEDICINE

## 2024-12-17 PROCEDURE — 84443 ASSAY THYROID STIM HORMONE: CPT | Performed by: FAMILY MEDICINE

## 2024-12-17 PROCEDURE — 3078F DIAST BP <80 MM HG: CPT | Mod: CPTII,S$GLB,, | Performed by: FAMILY MEDICINE

## 2024-12-17 PROCEDURE — 83036 HEMOGLOBIN GLYCOSYLATED A1C: CPT | Performed by: FAMILY MEDICINE

## 2024-12-17 PROCEDURE — G2211 COMPLEX E/M VISIT ADD ON: HCPCS | Mod: S$GLB,,, | Performed by: FAMILY MEDICINE

## 2024-12-17 PROCEDURE — 1159F MED LIST DOCD IN RCRD: CPT | Mod: CPTII,S$GLB,, | Performed by: FAMILY MEDICINE

## 2024-12-17 PROCEDURE — 1160F RVW MEDS BY RX/DR IN RCRD: CPT | Mod: CPTII,S$GLB,, | Performed by: FAMILY MEDICINE

## 2024-12-17 PROCEDURE — 83735 ASSAY OF MAGNESIUM: CPT | Performed by: FAMILY MEDICINE

## 2024-12-17 PROCEDURE — 3008F BODY MASS INDEX DOCD: CPT | Mod: CPTII,S$GLB,, | Performed by: FAMILY MEDICINE

## 2024-12-17 PROCEDURE — 82728 ASSAY OF FERRITIN: CPT | Performed by: FAMILY MEDICINE

## 2024-12-17 PROCEDURE — 3051F HG A1C>EQUAL 7.0%<8.0%: CPT | Mod: CPTII,S$GLB,, | Performed by: FAMILY MEDICINE

## 2024-12-17 PROCEDURE — 3066F NEPHROPATHY DOC TX: CPT | Mod: CPTII,S$GLB,, | Performed by: FAMILY MEDICINE

## 2024-12-17 PROCEDURE — 99214 OFFICE O/P EST MOD 30 MIN: CPT | Mod: S$GLB,,, | Performed by: FAMILY MEDICINE

## 2024-12-17 PROCEDURE — 85025 COMPLETE CBC W/AUTO DIFF WBC: CPT | Performed by: FAMILY MEDICINE

## 2024-12-17 RX ORDER — CARBAMAZEPINE 100 MG/1
100 TABLET, CHEWABLE ORAL 2 TIMES DAILY
COMMUNITY
Start: 2024-10-04

## 2024-12-17 RX ORDER — GABAPENTIN 600 MG/1
300 TABLET ORAL 2 TIMES DAILY
Qty: 60 TABLET | Refills: 0 | Status: SHIPPED | OUTPATIENT
Start: 2024-12-17

## 2024-12-17 RX ORDER — CLOTRIMAZOLE 10 MG/1
10 LOZENGE ORAL
COMMUNITY

## 2024-12-17 NOTE — PROGRESS NOTES
Subjective:       Patient ID: Leesa Le is a 64 y.o. female presents with   Patient Active Problem List   Diagnosis    Statin intolerance    Hyperlipidemia due to type 1 diabetes mellitus    Major depression, recurrent, chronic    Hypertension associated with type 1 diabetes mellitus    Non compliance w medication regimen    Iron deficiency anemia    Anxiety associated with depression    Multiple thyroid nodules    History of thyroid surgery    PTSD (post-traumatic stress disorder)    GENTRY on CPAP    Fatty liver    Type 2 diabetes mellitus with hyperglycemia, with long-term current use of insulin    Ameloblastoma of jaw    Atrophy mandible, severe    Dysphagia        Chief Complaint: Follow-up (Pt c/o involuntary tremors of BUE & BLE, has G-tube. Dtr wants to know if patient can take carbamazepine instead of gabapentin if the gabapentin isn't available at the pharmacy. Scheduled swallow test on 12/30/24)    History of Present Illness    Leesa presents today for follow-up regarding G-tube and medication management. G-tube placement was performed in August. She experiences facial pain on the right side and pain under the left arm. She reports tremors when laying down or walking. She takes gabapentin 300 mg twice daily prescribed by neurology and expresses concern about running out. She has not taken the carbamazepine prescribed by oral surgery. She declines oxycodone due to side effects. She receives nutrition through diabetic G-tube feeds.  Patient is requesting refill on gabapentin and reports that she has swallow study scheduled end of this month to see if she can get a reversal of G-tube      ROS:  General: -fever, -chills, -fatigue, -weight gain, -weight loss, -loss of appetite  Eyes: -vision changes, -blurry vision, -eye pain, -eye discharge  ENT: -ear pain, -hearing loss, -tinnitus, -nasal congestion, -sore throat  Cardiovascular: -chest pain, -palpitations, -lower extremity edema  Respiratory: -cough,  -shortness of breath, -wheezing, -sputum production  Endocrine: -polyuria, -polydipsia, -heat intolerance, -cold intolerance  Gastrointestinal: -abdominal pain, -heartburn, -nausea, -vomiting, -diarrhea, -constipation, -blood in stool  Genitourinary: -dysuria, -urgency, -frequency, -hematuria, -nocturia, -incontinence  Heme & Lymphatic: -easy or excessive bleeding, -easy bruising, -swollen lymph nodes  Musculoskeletal: -muscle pain, -back pain, -joint pain, -joint swelling  Skin: -rash, -lesion, -itching, -skin texture changes, -skin color changes  Neurological: -headache, -dizziness, -numbness, -tingling, -seizure activity, -speech difficulty, -memory loss, -confusion , +tremors  Psychiatric: -anxiety, -depression, -sleep difficulty                /66 (BP Location: Left arm, Patient Position: Sitting)   Pulse 95   Temp 97.6 °F (36.4 °C) (Tympanic)   Resp 20   Wt 42.7 kg (94 lb 2.2 oz)   LMP 06/23/2015   SpO2 95%   BMI 18.38 kg/m²   Objective:      Physical Exam  Constitutional:       Appearance: She is well-developed.   HENT:      Head: Normocephalic and atraumatic.   Cardiovascular:      Rate and Rhythm: Normal rate and regular rhythm.      Heart sounds: Normal heart sounds. No murmur heard.  Pulmonary:      Effort: Pulmonary effort is normal.      Breath sounds: Normal breath sounds. No wheezing.   Abdominal:      General: Bowel sounds are normal.      Palpations: Abdomen is soft.      Tenderness: There is no abdominal tenderness.      Comments: G-tube in place   Skin:     General: Skin is warm and dry.      Findings: No rash.   Neurological:      Mental Status: She is alert and oriented to person, place, and time.   Psychiatric:         Mood and Affect: Mood normal.           Assessment/Plan:   1. Neuropathy  -     gabapentin (NEURONTIN) 600 MG tablet; 0.5 tablets (300 mg total) by Per G Tube route 2 (two) times daily.  Dispense: 60 tablet; Refill: 0  -     CBC Auto Differential; Future; Expected  date: 12/17/2024  -     Basic Metabolic Panel; Future; Expected date: 12/17/2024  -     Magnesium; Future; Expected date: 12/17/2024  -     Ferritin; Future; Expected date: 12/17/2024  2. Atypical facial pain  -     gabapentin (NEURONTIN) 600 MG tablet; 0.5 tablets (300 mg total) by Per G Tube route 2 (two) times daily.  Dispense: 60 tablet; Refill: 0  -     CBC Auto Differential; Future; Expected date: 12/17/2024  -     Basic Metabolic Panel; Future; Expected date: 12/17/2024  -     Magnesium; Future; Expected date: 12/17/2024  -     Ferritin; Future; Expected date: 12/17/2024    Patient was advised to continue gabapentin as prescribed by Neurology and discuss further with Neurology and also let oral surgeon know that she has not started Tegretol as prescribed      3. Type 2 diabetes mellitus with hyperglycemia, with long-term current use of insulin  -     Hemoglobin A1C; Future; Expected date: 12/17/2024  Currently on insulin    4. Hypertension associated with type 1 diabetes mellitus  -     TSH; Future; Expected date: 12/17/2024  Blood pressure stable currently diet controlled    Visit today included increased complexity associated with the care of the episodic problem  addressed and managing the longitudinal care of the patient due to the serious and/or complex managed problem(s) .          This note was generated with the assistance of ambient listening technology. Verbal consent was obtained by the patient and accompanying visitor(s) for the recording of patient appointment to facilitate this note. I attest to having reviewed and edited the generated note for accuracy, though some syntax or spelling errors may persist. Please contact the author of this note for any clarification.

## 2024-12-30 ENCOUNTER — CLINICAL SUPPORT (OUTPATIENT)
Dept: REHABILITATION | Facility: HOSPITAL | Age: 64
End: 2024-12-30
Payer: COMMERCIAL

## 2024-12-30 ENCOUNTER — HOSPITAL ENCOUNTER (OUTPATIENT)
Dept: RADIOLOGY | Facility: HOSPITAL | Age: 64
Discharge: HOME | End: 2024-12-30
Payer: COMMERCIAL

## 2024-12-30 DIAGNOSIS — D16.5 AMELOBLASTOMA: ICD-10-CM

## 2024-12-30 DIAGNOSIS — Z98.890 S/P FLAP GRAFT: ICD-10-CM

## 2024-12-30 DIAGNOSIS — Z98.890 STATUS POST CRANIOFACIAL RECONSTRUCTION: ICD-10-CM

## 2024-12-30 PROCEDURE — 92611 MOTION FLUOROSCOPY/SWALLOW: CPT

## 2024-12-30 PROCEDURE — 74230 X-RAY XM SWLNG FUNCJ C+: CPT | Mod: TC

## 2024-12-30 PROCEDURE — 92610 EVALUATE SWALLOWING FUNCTION: CPT

## 2024-12-30 NOTE — PLAN OF CARE
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   Swallow and Oral Function Evaluation   10   Fl Modified Barium Swallow Speech  10     Time in: 2:05 PM   Time out: 2:25 PM   Total Billable Time: 20 minutes    Radiation time: 3.3 minutes    Precautions: Standard and Fall  Subjective   History of Current Condition: Leesa Le is a 64 y.o. female here today for Modified Barium Swallow Study (MBSS). 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 She underwent excisional biopsy of right anterior mandible and posterior mandible, resection of the anterior mandible, application of custom hardware, application of bone marrow aspirate concentrate (BMAC) and Vivigen bone graft of anterior mandible, and Axogen nerve reconstruction, and Mandibular hardware removal, application of reconstruction plate, left side scapular free flap reconstruction of mandible, bilateral neck exploration, and right submandibular gland biopsy. She reports she was drinking water and eating soft foods such as jello and pudding after her initial surgery. However, after her second surgery, she was strictly NPO with PEG tube dependence.     -Current diet at home: NPO  -Recommended diet from previous study: N/A  -Therapy received: N/A    The following observations were made:   -Mental status: Alert and Cooperative  -Factors affecting performance: no difficulties participating in the study  -Feeding Method: independent in self-feeding    Respiratory Status:   -Respiratory Status: room air    Past Medical History: Leesa Le  has a past medical history of Abnormal Pap smear of vagina,  "Arthritis, Diabetes mellitus type II, Hyperlipidemia, Hypertension, and Thyroid disease.  Leesa Le  has a past surgical history that includes Appendectomy; Thyroidectomy, partial; Tubal ligation; Tumor excision; Cardiac catheterization; Mouth floor mass excision (Bilateral); Colonoscopy (N/A, 06/23/2017); Breast biopsy (Right); and ameloblastoma.    Medical Hx and Allergies:   Review of patient's allergies indicates:   Allergen Reactions    Penicillins Edema    Statins-hmg-coa reductase inhibitors Other (See Comments)     Myalgia- tried pravastatin, simvastatin and lipitor        Objective     Modified Barium Swallow Study  Purpose: to evaluate anatomy and physiology of the oropharyngeal swallow, to determine effectiveness of rehabilitation strategies, and to determine diet consistency and intervention recommendations. The study was performed using the "Gold Standard" of 30 fps with as low as reasonably achievable (ALARA) exposure.     The patient was seen in radiology seated in High Velazquez's position in a video imaging chair for lateral views of the larynx and an A/P view. The study was conducted using Varibar thin liquid (IDDSI 0), Varibar nectar liquid (IDDSI 2), Varibar pudding (IDDSI 4), Peaches mixed with Varibar thin liquid (IDDSI 6/0), and solid coated in Varibar pudding (IDDSI 7). She tolerated the procedure well.    A cranial nerve evaluation revealed:   Cranial Nerve Examination  Cranial Nerve 5: Trigeminal Nerve  Motor Jaw Posture at rest: Closed  Mandible Elevation/Depression: Deviation to the right  Mandible lateralization: Unable to lateralize right  Abnormal movement: absent Interpretation: Asymmetry    Sensory Forehead: WFL  Cheek: WFL  Jaw:  Decreased  Facial Pain: None noted Interpretation: Patient reported decreased sensation of the chin     Cranial Nerve 7: Facial Nerve  Motor Facial Symmetry: asymmetry  Wrinkle Forehead: WFL  Close eyes tightly: WFL  Labial Protrusion: Decreased " ROM  Labial Retraction: Decreased ROM  Buccal Strength with Labial Seal: Reduced  Abnormal movement: absent Interpretation: Decreased ROM   Sensory Formal testing not completed. Pt denied any changes in taste      Cranial Nerves IX and X: Glossopharyngeal and Vagus Nerves  Motor Palatal Symmetry (Rest): WNL  Palatal Symmetry (Movement): WNL  Cough:  WFL  Voice Prior to PO intake: weak  Resonance: Normal  Abnormal movement: absent Interpretation: WFL     Cranial Nerve XII: Hypoglossal Nerve  Motor Tongue at rest: WNL  Lingual Protrusion: Unable to protrude past lips  Lingual Protrusion against Resistance: Weakness  Lingual Lateralization: Decreased ROM  Abnormal movement: absent Interpretation: Decreased strength and ROM     Other information:  Volitional Swallow: Able to palpate laryngeal rise  Mucosal Quality: No abnormal findings  Secretion Management: WFL  Dentition: Edentulous        Consistency  Presentation  Findings Strategy Attempted Rosenbeck's Penetration/Aspiration Scale (PAS)   Thin (IDDSI 0) Method: Self-fed    Volume: cup sip x5, \ sequential sips    Projection: lateral view; AP view  Oral phase: Anterior loss beyond mid-chin    Pharyngeal phase: Penetration during the swallow; Trace residue at the base of tongue and valleculae that was reduced with a cued sequential swallow    Esophageal screen: Retrograde flow observed  Best: (1) Material does not enter the airway    Worst: (2) Material enters the airway, remains above the vocal folds, and is ejected from the airway     Nectar thick (mildly thick/IDDSI 2) Method:Self-fed    Volume: cup sip x2     Projection: lateral view Oral phase: WFL    Pharyngeal phase: Penetration during the swallow; Trace residue at the valleculae that was reduced with a cued sequential swallow      Best: (1) Material does not enter the airway    Worst: (2) Material enters the airway, remains above the vocal folds, and is ejected from the airway     Puree (extremely thick/  IDDSI 4) Method: Self-fed    Volume: tsp bite x2     Projection: lateral view Oral phase: Trace-minimal oral residue that was reduced with a cued sequential swallow    Pharyngeal phase: Trace residue at the valleculae that was reduced with a cued sequential swallow         Best: (1) Material does not enter the airway    Worst: (1) Material does not enter the airway     Solid (regular/ IDDSI 7) Not completed. Patient reported she was not cleared to masticate yet.    Mixed consistency (thin/ IDDSI 0 + soft and bite sized/ IDDSI 6) Method: Self-fed    Volume: bite x2     Projection: lateral view Oral phase: Attempted but pt reported she was not cleared to masticate yet as her mandible has not healed. She attempted to soften the mixed consistency but ended up expectorating bolus.     Pharyngeal phase: One swallow observed appeared to be WFL  Best: (1) Material does not enter the airway    Worst: (1) Material does not enter the airway     Barium tablet  Method: Self-fed    Volume: single tablet with water bolus(es)    Projection: AP view Patient unable to swallow tablet. It remained in her oral cavity until she expectorated it.          Treatment   Treatment Time In: n/a  Treatment Time Out: n/a  Total Treatment Time: 0 mins  Patient educated regarding results and recommendations of the evaluation. See the recommendations section below.    Education: Plan of Care and anatomy and physiology of swallow mechanism as it relates to MBSS findings and recommendations were discussed with the patient. Patient expressed understanding. All questions were answered.     Assessment     Of note, patient with numerous surgical clips that overlie the soft tissues of the neck, somewhat obscuring evaluation.     Leesa Le is a 64 y.o. female referred for Modified Barium Swallow Study with a medical diagnosis of Ameloblastoma. The patient presents with moderate oral dysphagia as determined by the Dysphagia Outcome and Severity Scale  (REJI). Level 3: Moderate Dysphagia.    Modified Barium Swallow Study (MBSS) revealed oral phase characterized by reduced lingual and labial strength and range of motion for tongue control, bolus preparation and transport. Lip closure was reduced with escape beyond mid chin. Bolus prep and mastication was unable to be fully assessed as patient reported she had not been cleared to masticate. Lingual motion was brisk for adequate bolus transport. There was trace-minimal oral residue on puree consistency which was reduced with a cued sequential swallow. The swallow was initiated when the head of the bolus entered the vallecula.    Pharyngeal phase characterized by timely initiation of swallow across consistencies. The soft palate elevated for complete closure of the velopharyngeal port. During pharyngeal swallow, reduced base of tongue retraction, anterior hyoid excursion, laryngeal elevation, and pharyngeal stripping wave resulted in incomplete epiglottic inversion and UES opening with  penetration of thin and nectar consistency and trace residue at the base of tongue and valleculae which was reduced with cued sequential swallow. No aspiration was observed in today's study. However, it should be noted that the images were limited due to abundance of hardware that impeded viewing area.     Robust Esophageal Screening Test (REST) was used to screen esophageal phase of swallow (Sherita et al, 2021) in today's study, completed in anterior/posterior view, with intake of barium coated solid, large self-regulated sips of liquid, and barium tablet. Screening significant for dysmotility with retrograde flow of bolus noted.     Impressions: Patient presents with moderate oral dysphagia, likely acute and related to multiple facial surgeries resulting in decreased lingual/labial strength and ROM and trismus. Unable to fully assess bolus prep and mastication as patient reported she was not cleared to masticate from her surgeon at  this time. In consideration of the Dynamic Imaging Grade of Swallowing Toxicity (DIGEST) (Monik et al, 2017), patient presents with preserved safety of swallow and mild-moderate efficiency impairment. Patient appears to be at low risk for aspiration related PNA in consideration of three pillars of aspiration pneumonia (Donna, 2005) including preserved oral health status, overall health/immune status, and laryngeal vestibule closure/severity of dysphagia. However, unable to assess risk related to aspiration pneumonia cause by the aspiration of gastric content. Patient would not benefit from swallowing rehabilitation at this time.     Functional Oral Intake Scale (FOIS)  The Functional Oral Intake Scale (FOIS) is an ordinal scale that is used to assess the current status and meaningful change in the oral intake. FOIS levels include:    TUBE DEPENDENT (levels 1-3) 1. No oral intake  2. Tube dependent with minimal/inconsistent oral intake  3. Tube supplements with consistent oral intake      TOTAL ORAL INTAKE (levels 4-7) 4. Total oral intake of a single consistency  5. Total oral intake of multiple consistencies requiring special preparation  6. Total oral intake with no special preparation, but must avoid specific foods or liquid items  7. Total oral intake with no restrictions     Patient is currently judged to be at FOIS level 5.      References:  MARC Thompson. (2005, March). Pneumonia: Factors Beyond Aspiration. Perspectives in Swallowing and Swallowing Disorders (Dysphagia), 14, 10-16.  Leslie KA, Chel MP, Susana DA, Deandre ALONSOK, Beata HY, Denny RS, Denisse CD, Frantz SY, Paulo CP, Keith J, Lazarus CL, May A, Julio J, John JW, Paco HM, Ingrid JS. Dynamic Imaging Grade of Swallowing Toxicity (DIGEST): Scale development and validation. Cancer. 2017 Jan 1;123(1):62-70. doi: 10.1002/cncr.43244. Epub 2016 Aug 26. PMID: 39648238; PMCID: FPI4325905.  NANO Magallon, MARC Adan, GAEL Gregg, & MARC Gore (2021).  Diagnostic Accuracy of an Esophageal Screening Protocol Interpreted by the Speech-Language Pathologist. Dysphagia, 366), [phone]. [email or web address]    Recommendations:     Consistency Recommendations:  thin liquids (IDDSI 0) and puree consistencies (IDDSI 4). Once patient is able to masticate, solid consistency may be trialed as tolerated.   Risk Management/Swallow Guidelines: use good oral hygiene  and sit upright for all PO intake  Therapy: Dysphagia therapy is not recommended at this time.  Follow-up exam: Follow up swallow study is not indicated at this time.     Please contact [facility] at [phone] if there are questions re: the above or if we can be of additional service to this patient.    Alice Hanks, CCC-SLP, CBIS   Speech Language Pathologist   Certified Brain Injury Specialist   12/30/2024

## 2025-01-03 ENCOUNTER — OFFICE VISIT (OUTPATIENT)
Dept: DIABETES | Facility: CLINIC | Age: 65
End: 2025-01-03
Payer: COMMERCIAL

## 2025-01-03 DIAGNOSIS — E78.5 HYPERLIPIDEMIA, UNSPECIFIED HYPERLIPIDEMIA TYPE: ICD-10-CM

## 2025-01-03 DIAGNOSIS — E11.59 HYPERTENSION ASSOCIATED WITH DIABETES: ICD-10-CM

## 2025-01-03 DIAGNOSIS — I15.2 HYPERTENSION ASSOCIATED WITH DIABETES: ICD-10-CM

## 2025-01-03 DIAGNOSIS — Z93.1 S/P PERCUTANEOUS ENDOSCOPIC GASTROSTOMY (PEG) TUBE PLACEMENT: ICD-10-CM

## 2025-01-03 DIAGNOSIS — E11.65 TYPE 2 DIABETES MELLITUS WITH HYPERGLYCEMIA, WITH LONG-TERM CURRENT USE OF INSULIN: Primary | ICD-10-CM

## 2025-01-03 DIAGNOSIS — Z79.4 TYPE 2 DIABETES MELLITUS WITH HYPERGLYCEMIA, WITH LONG-TERM CURRENT USE OF INSULIN: Primary | ICD-10-CM

## 2025-01-03 NOTE — PROGRESS NOTES
PCP: Rosangela Cat MD    Subjective:     Chief Complaint: Diabetes - Established Patient    TELEMEDICINE VISIT:     The patient location is: Home  The chief complaint leading to consultation is: Diabetes Follow up  Visit type: Virtual visit with synchronous audio and video  Total time spent: 20  Each patient to whom he or she provides medical services by telemedicine is:  (1) informed of the relationship between the physician and patient and the respective role of any other health care provider with respect to management of the patient; and (2) notified that he or she may decline to receive medical services by telemedicine and may withdraw from such care at any time.    Notes: N/A     HISTORY OF PRESENT ILLNESS: 64 y.o.   female presenting for diabetes management visit.   The patient's last visit with me was on 12/3/2024.    Patient has had diabetes with past history of low C peptide since 10 years ago.   Pertinent to decision making is the following comorbidities: HTN, HLD and Multinodular goiter s/p thyroid surgery, GENTRY on CPAP, Statin Intolerance, and Medical Noncompliance  Patient has the following Diabetes complications: without complications  She  has attended diabetes education in the past.      Patient's most recent A1c of 5.8% was completed 2 weeks ago.   Patient states since Her last A1c Her blood glucose levels have been high  after meals.   Patient monitors blood glucose 4 times per day and Continuously with personal CGM Dexcom.   Patient blood glucose monitoring device will be uploaded into [employer] [facility] today.          Patient reports shows baseline euglycemia with improved postprandial coverage following peg tube feedings with IC of 10 with occasional postprandial hyperglycemia. Starting to supplement feedings with food.     Patient endorses the following diabetes related symptoms: None.   Patient is due today for the following diabetes-related health maintenance standards: Eye Exam,  Influenza Vaccine, COVID-19 Vaccine , and RSV Vaccine .   She voices recent hospital admissions or emergency room visits for jaw reconstruction surgery on 08/26/24. Pt will need thirs surgery due to skin breakdown tomorrow.   She denies having hypoglycemia.  Patient's concerns today include glycemic control.   Patient medication regimen is as below. Currently has peg tube but just passed swallow study for soft foods.     CURRENT DM MEDICATIONS:   Novolog every 3 hours as needed   Novolog IC 10 (or 1 unit for every 10g of carbs)- dose 10 mins before meal                 To Correct Blood Sugar (TO add to meal time dose OR to be used as correction after 3 hours if not eating:    - 180: +2 units   - 200: +3 units    - 220: +4 units   - 240: +5 units   - 260: +6 units   - 280: +7 units   - 300: +8 units   - 320: +9 units   - 340: +10 units   - 380: +11 units   - 400: +12 units  BG +400: +13 units - MAX    Patient has failed the following Diabetes medications:   Glucovance   Basaglar       Labs Reviewed.       Lab Results   Component Value Date    CPEPTIDE 1.47 07/05/2024     Lab Results   Component Value Date    GLUTAMICACID 0.00 04/10/2023          //   , There is no height or weight on file to calculate BMI.  Her blood sugar in clinic today is:    Lab Results   Component Value Date    POCGLU 171 (A) 07/08/2024       Review of Systems   Constitutional:  Negative for activity change, appetite change, chills and fever.   HENT:  Negative for dental problem, mouth sores, nosebleeds, sore throat and trouble swallowing.    Eyes:  Negative for pain and discharge.   Respiratory:  Negative for shortness of breath, wheezing and stridor.    Cardiovascular:  Negative for chest pain, palpitations and leg swelling.   Gastrointestinal:  Negative for abdominal pain, diarrhea, nausea and vomiting.   Endocrine: Negative for polydipsia, polyphagia and polyuria.    Genitourinary:  Negative for dysuria, frequency and urgency.   Musculoskeletal:  Negative for joint swelling and myalgias.   Skin:  Negative for rash and wound.   Neurological:  Negative for dizziness, syncope, weakness and headaches.   Psychiatric/Behavioral:  Negative for behavioral problems and dysphoric mood.          Diabetes Management Status  Statin: Not taking  ACE/ARB: Taking    Screening or Prevention Patient's value Goal Complete/Controlled?   HgA1C Testing and Control   Lab Results   Component Value Date    HGBA1C 5.8 (H) 2024      Annually/Less than 8% No   Lipid profile : 2024 Annually Yes   LDL control Lab Results   Component Value Date    LDLCALC 112 2024    Annually/Less than 100 mg/dl  No   Nephropathy screening Lab Results   Component Value Date    MICALBCREAT 12.8 2024     Lab Results   Component Value Date    PROTEINUA Negative 2024    Annually Yes   Blood pressure BP Readings from Last 1 Encounters:   24 106/66    Less than 140/90 Yes   Dilated retinal exam : 2017 Annually No    Foot exam   : 2024 Annually No     Social History     Socioeconomic History    Marital status:     Number of children: 3   Occupational History     Employer: [employer]   Tobacco Use    Smoking status: Former     Current packs/day: 0.00     Average packs/day: 1 pack/day for 28.0 years (28.0 ttl pk-yrs)     Types: Cigarettes     Start date:      Quit date: 2007     Years since quittin.9    Smokeless tobacco: Former    Tobacco comments:     smoked off and on from 1054-0012 about 15 years total years smoking.   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     Social Drivers of Health     Financial Resource Strain: Low Risk  (2024)     [facility]    Overall Financial Resource Strain (CARDIA)     Difficulty of Paying Living Expenses: Not hard at all   Food Insecurity: Patient Declined (11/19/2024)    Received from [facility]    Hunger Vital Sign     Worried About Running Out of Food in the Last Year: Patient declined     Ran Out of Food in the Last Year: Patient declined   Transportation Needs: No Transportation Needs (11/19/2024)    Received from [facility]    PRAPARE - Transportation     Lack of Transportation (Medical): No     Lack of Transportation (Non-Medical): No   Physical Activity: Inactive (8/20/2024)    [facility]    Exercise Vital Sign     Days of Exercise per Week: 0 days     Minutes of Exercise per Session: 0 min   Stress: No Stress Concern Present (8/20/2024)    Received from [facility]    [facility] [place] [place] [facility] [employer] [facility] - Occupational Stress Questionnaire     Feeling of Stress : Not at all   Housing Stability: Low Risk  (11/19/2024)    [facility]    Housing Stability Vital Sign     Unable to Pay for Housing in the Last Year: No     Number of Times Moved in the Last Year: 0     Homeless in the Last Year: No     Past Medical History:   Diagnosis Date    Abnormal Pap smear of vagina     Arthritis     Left shoulder    Diabetes mellitus type II     Dr Ayala manages DM     Hyperlipidemia     Hypertension     Thyroid disease     Dr Melissa garcia- goitre+       Objective:        Physical Exam  Constitutional:       General: She is not in acute  distress.     Appearance: She is well-developed. She is not diaphoretic.   HENT:      Head: Normocephalic and atraumatic.      Right Ear: External ear normal.      Left Ear: External ear normal.      Nose: Nose normal.   Eyes:      General:         Right eye: No discharge.         Left eye: No discharge.   Pulmonary:      Effort: Pulmonary effort is normal. No respiratory distress.      Breath sounds: No stridor.   Musculoskeletal:         General: Normal range of motion.      Cervical back: Normal range of motion.   Skin:     Coloration: Skin is not pale.   Neurological:      Mental Status: She is alert and oriented to person, place, and time. Mental status is at baseline.      Motor: No abnormal muscle tone.      Coordination: Coordination normal.   Psychiatric:         Mood and Affect: Mood normal.         Behavior: Behavior normal.         Thought Content: Thought content normal.         Judgment: Judgment normal.           Assessment / Plan:     Type 2 diabetes mellitus with hyperglycemia, with long-term current use of insulin    Hypertension associated with diabetes    Hyperlipidemia, unspecified hyperlipidemia type    S/P percutaneous endoscopic gastrostomy (PEG) tube placement          Additional Plan Details:    - POCT Glucose  - Encouraged continuation of lifestyle changes including regular exercise and limiting carbohydrates to 30-45 grams per meal threes times daily and 15 grams per snack with a limit of two daily.   - Encouraged continued monitoring of blood glucose with maintenance of 4 times daily and Continuously with personal CGM Dexcom.   - Current DM Medication Regimen: Continue Novolog to IC 8 (or 1 unit for every 8g of carb) and correction dosing per scale below.   - Health Maintenance standards addressed today: Eye Exam - will be completed outside of [facility] and patient will schedule, Flu Shot - patient would like to complete outside of [facility], COVID - 19 Vaccine - patient will schedule  outside of [facility] , and RSV vaccine  - Nursing Visit: Patient is age 79 or younger with an A1c of 7.5 or greater and will not need nursing visit at this time .   - Follow up in 6 wks.    CURRENT DM MEDICATIONS:   Novolog every 3 hours as needed   Novolog IC 8 (or 1 unit for every 8g of carbs)- dose 10 mins before meal                 To Correct Blood Sugar (TO add to meal time dose OR to be used as correction after 3 hours if not eating:    - 180: +2 units   - 200: +3 units    - 220: +4 units   - 240: +5 units   - 260: +6 units   - 280: +7 units   - 300: +8 units   - 320: +9 units   - 340: +10 units   - 380: +11 units   - 400: +12 units  BG +400: +13 units - MAX Blakeney McKnight, PA-C [facility]

## 2025-01-03 NOTE — PATIENT INSTRUCTIONS
CURRENT DM MEDICATIONS:   Novolog every 3 hours as needed   Novolog IC 8 (or 1 unit for every 8g of carbs)- dose 10 mins before meal                 To Correct Blood Sugar (TO add to meal time dose OR to be used as correction after 3 hours if not eating:    - 180: +2 units   - 200: +3 units    - 220: +4 units   - 240: +5 units   - 260: +6 units   - 280: +7 units   - 300: +8 units   - 320: +9 units   - 340: +10 units   - 380: +11 units   - 400: +12 units  BG +400: +13 units - MAX

## 2025-02-14 ENCOUNTER — OFFICE VISIT (OUTPATIENT)
Dept: DIABETES | Facility: CLINIC | Age: 65
End: 2025-02-14
Payer: COMMERCIAL

## 2025-02-14 DIAGNOSIS — G72.0 STATIN MYOPATHY: ICD-10-CM

## 2025-02-14 DIAGNOSIS — I15.2 HYPERTENSION ASSOCIATED WITH DIABETES: ICD-10-CM

## 2025-02-14 DIAGNOSIS — Z79.4 TYPE 2 DIABETES MELLITUS WITH HYPERGLYCEMIA, WITH LONG-TERM CURRENT USE OF INSULIN: Primary | ICD-10-CM

## 2025-02-14 DIAGNOSIS — Z93.1 S/P PERCUTANEOUS ENDOSCOPIC GASTROSTOMY (PEG) TUBE PLACEMENT: ICD-10-CM

## 2025-02-14 DIAGNOSIS — E11.59 HYPERTENSION ASSOCIATED WITH DIABETES: ICD-10-CM

## 2025-02-14 DIAGNOSIS — T46.6X5A STATIN MYOPATHY: ICD-10-CM

## 2025-02-14 DIAGNOSIS — E78.5 HYPERLIPIDEMIA, UNSPECIFIED HYPERLIPIDEMIA TYPE: ICD-10-CM

## 2025-02-14 DIAGNOSIS — E11.65 TYPE 2 DIABETES MELLITUS WITH HYPERGLYCEMIA, WITH LONG-TERM CURRENT USE OF INSULIN: Primary | ICD-10-CM

## 2025-02-14 PROCEDURE — G2211 COMPLEX E/M VISIT ADD ON: HCPCS | Mod: 95,,, | Performed by: PHYSICIAN ASSISTANT

## 2025-02-14 PROCEDURE — 98005 SYNCH AUDIO-VIDEO EST LOW 20: CPT | Mod: 95,,, | Performed by: PHYSICIAN ASSISTANT

## 2025-02-14 PROCEDURE — 95251 CONT GLUC MNTR ANALYSIS I&R: CPT | Mod: NDTC,,, | Performed by: PHYSICIAN ASSISTANT

## 2025-02-14 NOTE — PROGRESS NOTES
PCP: Rosangela Cat MD    Subjective:     Chief Complaint: Diabetes - Established Patient    TELEMEDICINE VISIT:     The patient location is: Home  The chief complaint leading to consultation is: Diabetes Follow up  Visit type: Virtual visit with synchronous audio and video  Each patient to whom he or she provides medical services by telemedicine is:  (1) informed of the relationship between the physician and patient and the respective role of any other health care provider with respect to management of the patient; and (2) notified that he or she may decline to receive medical services by telemedicine and may withdraw from such care at any time.    Notes: N/A     HISTORY OF PRESENT ILLNESS: 64 y.o.   female presenting for diabetes management visit.   The patient's last visit with me was on 1/3/2025.   Patient has had diabetes with past history of low C peptide since 10 years ago.   Pertinent to decision making is the following comorbidities: HTN, HLD and Multinodular goiter s/p thyroid surgery, GENTRY on CPAP, Statin Intolerance, and Medical Noncompliance  Patient has the following Diabetes complications: without complications  She  has attended diabetes education in the past.      Patient's most recent A1c of 5.8% was completed 2 months ago.   Patient states since Her last A1c Her blood glucose levels have been high  after meals.   Patient monitors blood glucose 4 times per day and Continuously with personal CGM Dexcom.   Patient blood glucose monitoring device will be uploaded into Media Section today.          Patient reports shows baseline euglycemia with some postprandial hyperglycemia with Ic of 8. Pt had Peg tube removed last week.     Patient endorses the following diabetes related symptoms: None.   Patient is due today for the following diabetes-related health maintenance standards: Eye Exam, Influenza Vaccine, COVID-19 Vaccine , and RSV Vaccine .   She voices recent hospital admissions or  emergency room visits for jaw reconstruction surgery on 08/26/24.   She denies having hypoglycemia.  Patient's concerns today include glycemic control.   Patient medication regimen is as below. Currently has peg tube but just passed swallow study for soft foods.     CURRENT DM MEDICATIONS:   Novolog every 3 hours as needed   Novolog IC 8 (or 1 unit for every 8g of carbs)- dose 10 mins before meal                 To Correct Blood Sugar (TO add to meal time dose OR to be used as correction after 3 hours if not eating:    - 180: +2 units   - 200: +3 units    - 220: +4 units   - 240: +5 units   - 260: +6 units   - 280: +7 units   - 300: +8 units   - 320: +9 units   - 340: +10 units   - 380: +11 units   - 400: +12 units  BG +400: +13 units - MAX    Patient has failed the following Diabetes medications:   Glucovance   Basaglar       Labs Reviewed.       Lab Results   Component Value Date    CPEPTIDE 1.47 07/05/2024     Lab Results   Component Value Date    GLUTAMICACID 0.00 04/10/2023          //   , There is no height or weight on file to calculate BMI.  Her blood sugar in clinic today is:    Lab Results   Component Value Date    POCGLU 171 (A) 07/08/2024       Review of Systems   Constitutional:  Negative for activity change, appetite change, chills and fever.   HENT:  Negative for dental problem, mouth sores, nosebleeds, sore throat and trouble swallowing.    Eyes:  Negative for pain and discharge.   Respiratory:  Negative for shortness of breath, wheezing and stridor.    Cardiovascular:  Negative for chest pain, palpitations and leg swelling.   Gastrointestinal:  Negative for abdominal pain, diarrhea, nausea and vomiting.   Endocrine: Negative for polydipsia, polyphagia and polyuria.   Genitourinary:  Negative for dysuria, frequency and urgency.   Musculoskeletal:  Negative for joint swelling and myalgias.   Skin:  Negative for rash and wound.    Neurological:  Negative for dizziness, syncope, weakness and headaches.   Psychiatric/Behavioral:  Negative for behavioral problems and dysphoric mood.          Diabetes Management Status  Statin: Not taking  ACE/ARB: Taking    Screening or Prevention Patient's value Goal Complete/Controlled?   HgA1C Testing and Control   Lab Results   Component Value Date    HGBA1C 5.8 (H) 2024      Annually/Less than 8% No   Lipid profile : 2024 Annually Yes   LDL control Lab Results   Component Value Date    LDLCALC 112 2024    Annually/Less than 100 mg/dl  No   Nephropathy screening Lab Results   Component Value Date    MICALBCREAT 12.8 2024     Lab Results   Component Value Date    PROTEINUA Negative 2024    Annually Yes   Blood pressure BP Readings from Last 1 Encounters:   24 106/66    Less than 140/90 Yes   Dilated retinal exam : 2017 Annually No    Foot exam   : 2024 Annually No     Social History     Socioeconomic History    Marital status:     Number of children: 3   Occupational History     Employer: [employer]   Tobacco Use    Smoking status: Former     Current packs/day: 0.00     Average packs/day: 1 pack/day for 28.0 years (28.0 ttl pk-yrs)     Types: Cigarettes     Start date:      Quit date: 2007     Years since quittin.1    Smokeless tobacco: Former    Tobacco comments:     smoked off and on from [id number] about 15 years total years smoking.   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     Social Drivers of Health     Financial Resource Strain: Low Risk  (2024)    [facility]sidi[facility] and Affili[facility]    Overall Financial Resource Strain (CARDIA)     Difficulty  of Paying Living Expenses: Not hard at all   Food Insecurity: Patient Declined (2/4/2025)    [facility]    Hunger Vital Sign     Worried About Running Out of Food in the Last Year: Patient declined     Ran Out of Food in the Last Year: Patient declined   Transportation Needs: Patient Declined (2/4/2025)    [facility]    PRAPARE - Transportation     Lack of Transportation (Medical): Patient declined     Lack of Transportation (Non-Medical): Patient declined   Physical Activity: Inactive (8/20/2024)    [facility]    Exercise Vital Sign     Days of Exercise per Week: 0 days     Minutes of Exercise per Session: 0 min   Stress: No Stress Concern Present (8/20/2024)    Received from [facility] and Affiliates    [employer] [employer] - Occupational Stress Questionnaire     Feeling of Stress : Not at all   Housing Stability: Patient Declined (2/4/2025)    [facility]    Housing Stability Vital Sign     Unable to Pay for Housing in the Last Year: Patient declined     Number of Times Moved in the Last Year: 0     Homeless in the Last Year: Patient declined     Past Medical History:   Diagnosis Date    Abnormal Pap smear of vagina     Arthritis     Left shoulder    Diabetes mellitus type II     Dr Ayala manages DM     Hyperlipidemia     Hypertension     Thyroid disease     Dr Melissa garcia- goitre+       Objective:        Physical Exam  Constitutional:       General: She is not in acute distress.     Appearance: She is well-developed. She is not diaphoretic.   HENT:      Head: Normocephalic and  atraumatic.      Right Ear: External ear normal.      Left Ear: External ear normal.      Nose: Nose normal.   Eyes:      General:         Right eye: No discharge.         Left eye: No discharge.   Pulmonary:      Effort: Pulmonary effort is normal. No respiratory distress.      Breath sounds: No stridor.   Musculoskeletal:         General: Normal range of motion.      Cervical back: Normal range of motion.   Skin:     Coloration: Skin is not pale.   Neurological:      Mental Status: She is alert and oriented to person, place, and time.      Motor: No abnormal muscle tone.      Coordination: Coordination normal.   Psychiatric:         Behavior: Behavior normal.         Thought Content: Thought content normal.         Judgment: Judgment normal.           Assessment / Plan:     Type 2 diabetes mellitus with hyperglycemia, with long-term current use of insulin    Hypertension associated with diabetes    Hyperlipidemia, unspecified hyperlipidemia type    S/P percutaneous endoscopic gastrostomy (PEG) tube placement    Statin myopathy          Additional Plan Details:    - POCT Glucose  - Encouraged continuation of lifestyle changes including regular exercise and limiting carbohydrates to 30-45 grams per meal threes times daily and 15 grams per snack with a limit of two daily.   - Encouraged continued monitoring of blood glucose with maintenance of 4 times daily and Continuously with personal CGM Dexcom.   - Current DM Medication Regimen: Continue Novolog to IC 7 (or 1 unit for every 7g of carb) and correction dosing per scale below.   - Health Maintenance standards addressed today: Eye Exam - will be completed outside of [facility] and patient will schedule, Flu Shot - patient would like to complete outside of [facility], COVID - 19 Vaccine - patient will schedule outside of [facility] , and RSV vaccine  - Nursing Visit: Patient is age 79 or younger with an A1c of 7.5 or greater and will not need nursing visit at this time .    - Follow up in 6 wks.    CURRENT DM MEDICATIONS:   Novolog every 3 hours as needed   Novolog IC 7 (or 1 unit for every 7g of carbs)- dose 10 mins before meal                 To Correct Blood Sugar (TO add to meal time dose OR to be used as correction after 3 hours if not eating:    - 180: +2 units   - 200: +3 units    - 220: +4 units   - 240: +5 units   - 260: +6 units   - 280: +7 units   - 300: +8 units   - 320: +9 units   - 340: +10 units   - 380: +11 units   - 400: +12 units  BG +400: +13 units - MAX Blakeney McKnight, PA-C [facility]

## 2025-02-24 ENCOUNTER — TELEPHONE (OUTPATIENT)
Dept: DIABETES | Facility: CLINIC | Age: 65
End: 2025-02-24
Payer: COMMERCIAL

## 2025-02-24 NOTE — PATIENT INSTRUCTIONS
CURRENT DM MEDICATIONS:   Novolog every 3 hours as needed   Novolog IC 7 (or 1 unit for every 7g of carbs)- dose 10 mins before meal                 To Correct Blood Sugar (TO add to meal time dose OR to be used as correction after 3 hours if not eating:    - 180: +2 units   - 200: +3 units    - 220: +4 units   - 240: +5 units   - 260: +6 units   - 280: +7 units   - 300: +8 units   - 320: +9 units   - 340: +10 units   - 380: +11 units   - 400: +12 units  BG +400: +13 units - MAX

## 2025-02-24 NOTE — TELEPHONE ENCOUNTER
"----- Message from Bulmaro Aguilera PA-C sent at 2/23/2025  8:16 PM CST -----  6 wk virtual "dex sharing"   "

## 2025-03-18 ENCOUNTER — EXTERNAL HOME HEALTH (OUTPATIENT)
Dept: HOME HEALTH SERVICES | Facility: HOSPITAL | Age: 65
End: 2025-03-18
Payer: COMMERCIAL

## 2025-03-26 ENCOUNTER — TELEPHONE (OUTPATIENT)
Dept: INTERNAL MEDICINE | Facility: CLINIC | Age: 65
End: 2025-03-26
Payer: COMMERCIAL

## 2025-03-26 NOTE — TELEPHONE ENCOUNTER
----- Message from Rosa M sent at 3/26/2025 12:08 PM CDT -----  Contact: Deandra Liriano with [facility] is calling to speak with the nurse regarding orders. Reports needing a verbal order to continue speech therapy for home health. Please give Deandra a call back at [phone]

## 2025-03-26 NOTE — TELEPHONE ENCOUNTER
S/w HH Nurse and gave verbal order for pt to continue speech therapy. HH Nurse voiced understanding to verbal order given./Kmw

## 2025-04-07 ENCOUNTER — OFFICE VISIT (OUTPATIENT)
Dept: DIABETES | Facility: CLINIC | Age: 65
End: 2025-04-07
Payer: COMMERCIAL

## 2025-04-07 DIAGNOSIS — E11.65 TYPE 2 DIABETES MELLITUS WITH HYPERGLYCEMIA, WITH LONG-TERM CURRENT USE OF INSULIN: Primary | ICD-10-CM

## 2025-04-07 DIAGNOSIS — E78.5 HYPERLIPIDEMIA, UNSPECIFIED HYPERLIPIDEMIA TYPE: ICD-10-CM

## 2025-04-07 DIAGNOSIS — I15.2 HYPERTENSION ASSOCIATED WITH DIABETES: ICD-10-CM

## 2025-04-07 DIAGNOSIS — T46.6X5A STATIN MYOPATHY: ICD-10-CM

## 2025-04-07 DIAGNOSIS — Z79.4 TYPE 2 DIABETES MELLITUS WITH HYPERGLYCEMIA, WITH LONG-TERM CURRENT USE OF INSULIN: Primary | ICD-10-CM

## 2025-04-07 DIAGNOSIS — E11.59 HYPERTENSION ASSOCIATED WITH DIABETES: ICD-10-CM

## 2025-04-07 DIAGNOSIS — G72.0 STATIN MYOPATHY: ICD-10-CM

## 2025-04-07 DIAGNOSIS — Z12.31 SCREENING MAMMOGRAM FOR BREAST CANCER: ICD-10-CM

## 2025-04-07 NOTE — PROGRESS NOTES
PCP: Rosangela Cat MD    Subjective:     Chief Complaint: Diabetes - Established Patient    TELEMEDICINE VISIT:     The patient location is: Home  The chief complaint leading to consultation is: Diabetes Follow up  Visit type: Virtual visit with synchronous audio and video  Each patient to whom he or she provides medical services by telemedicine is:  (1) informed of the relationship between the physician and patient and the respective role of any other health care provider with respect to management of the patient; and (2) notified that he or she may decline to receive medical services by telemedicine and may withdraw from such care at any time.    Notes: N/A     HISTORY OF PRESENT ILLNESS: 65 y.o.   female presenting for diabetes management visit.   The patient's last visit with me was on 2/14/2025.   Patient has had diabetes with past history of low C peptide since 10 years ago.   Pertinent to decision making is the following comorbidities: HTN, HLD and Multinodular goiter s/p thyroid surgery, GENTRY on CPAP, Statin Intolerance, and Medical Noncompliance  Patient has the following Diabetes complications: without complications  She  has attended diabetes education in the past.      Patient's most recent A1c of 5.8% was completed 2 months ago.   Patient states since Her last A1c Her blood glucose levels have been high  after meals.   Patient monitors blood glucose 4 times per day and Continuously with personal CGM Dexcom.   Patient blood glucose monitoring device will be uploaded into Media Section today.          Interpretation of CGM as following: baseline euglycemia with appropriate coverage of carb bolusing.     Patient endorses the following diabetes related symptoms: None.   Patient is due today for the following diabetes-related health maintenance standards: Eye Exam, Influenza Vaccine, COVID-19 Vaccine , and RSV Vaccine and Mammo .   She voices recent hospital admissions or emergency room  visits for jaw reconstruction surgery on 08/26/24.   She denies having hypoglycemia.  Patient's concerns today include glycemic control.   Patient medication regimen is as below. Patient has swallow study for soft foods and liquids.     CURRENT DM MEDICATIONS:   Novolog every 3 hours as needed   Novolog IC 7 (or 1 unit for every 7g of carbs)- dose 10 mins before meal                 To Correct Blood Sugar (TO add to meal time dose OR to be used as correction after 3 hours if not eating:    - 180: +2 units   - 200: +3 units    - 220: +4 units   - 240: +5 units   - 260: +6 units   - 280: +7 units   - 300: +8 units   - 320: +9 units   - 340: +10 units   - 380: +11 units   - 400: +12 units  BG +400: +13 units - MAX    Patient has failed the following Diabetes medications:   Glucovance   Basaglar       Labs Reviewed.       Lab Results   Component Value Date    CPEPTIDE 1.47 07/05/2024     Lab Results   Component Value Date    GLUTAMICACID 0.00 04/10/2023          //   , There is no height or weight on file to calculate BMI.  Her blood sugar in clinic today is:    Lab Results   Component Value Date    POCGLU 171 (A) 07/08/2024       Review of Systems   Constitutional:  Negative for activity change, appetite change, chills and fever.   HENT:  Negative for dental problem, mouth sores, nosebleeds, sore throat and trouble swallowing.    Eyes:  Negative for pain and discharge.   Respiratory:  Negative for shortness of breath, wheezing and stridor.    Cardiovascular:  Negative for chest pain, palpitations and leg swelling.   Gastrointestinal:  Negative for abdominal pain, diarrhea, nausea and vomiting.   Endocrine: Negative for polydipsia, polyphagia and polyuria.   Genitourinary:  Negative for dysuria, frequency and urgency.   Musculoskeletal:  Negative for joint swelling and myalgias.   Skin:  Negative for rash and wound.   Neurological:  Negative for  dizziness, syncope, weakness and headaches.   Psychiatric/Behavioral:  Negative for behavioral problems and dysphoric mood.          Diabetes Management Status  Statin: Not taking  ACE/ARB: Taking    Screening or Prevention Patient's value Goal Complete/Controlled?   HgA1C Testing and Control   Lab Results   Component Value Date    HGBA1C 5.8 (H) 2024      Annually/Less than 8% No   Lipid profile : 2024 Annually Yes   LDL control Lab Results   Component Value Date    LDLCALC 112 2024    Annually/Less than 100 mg/dl  No   Nephropathy screening Lab Results   Component Value Date    MICALBCREAT 12.8 2024     Lab Results   Component Value Date    PROTEINUA Negative 2024    Annually Yes   Blood pressure BP Readings from Last 1 Encounters:   24 106/66    Less than 140/90 Yes   Dilated retinal exam : 2017 Annually No    Foot exam   : 2024 Annually No     Social History     Socioeconomic History    Marital status:     Number of children: 3   Occupational History     Employer: [employer]   Tobacco Use    Smoking status: Former     Current packs/day: 0.00     Average packs/day: 1 pack/day for 28.0 years (28.0 ttl pk-yrs)     Types: Cigarettes     Start date:      Quit date: 2007     Years since quittin.2    Smokeless tobacco: Former    Tobacco comments:     smoked off and on from [id number] about 15 years total years smoking.   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     Social Drivers of Health     Financial Resource Strain: Low Risk  (2024)    [facility]    Overall Financial Resource Strain (CARDIA)     Difficulty of Paying Living Expenses: Not  hard at all   Food Insecurity: Patient Declined (2/4/2025)    [facility]sidiaries and Affiliates    Hunger Vital Sign     Worried About Running Out of Food in the Last Year: Patient declined     Ran Out of Food in the Last Year: Patient declined   Transportation Needs: Patient Declined (2/4/2025)    [facility]    PRAPARE - Transportation     Lack of Transportation (Medical): Patient declined     Lack of Transportation (Non-Medical): Patient declined   Physical Activity: Unknown (4/2/2025)    Received from [facility]    Exercise Vital Sign     Days of Exercise per Week: 7 days     Minutes of Exercise per Session: Patient unable to answer   Stress: No Stress Concern Present (8/20/2024)    [facility]    [place] [employer] - Occupational Stress Questionnaire     Feeling of Stress : Not at all   Housing Stability: Patient Declined (2/4/2025)    [facility]    Housing Stability Vital Sign     Unable to Pay for Housing in the Last Year: Patient declined     Number of Times Moved in the Last Year: 0     Homeless in the Last Year: Patient declined     Past Medical History:   Diagnosis Date    Abnormal Pap smear of vagina     Arthritis     Left shoulder    Diabetes mellitus type II     Dr Ayala manages DM     Hyperlipidemia     Hypertension     Thyroid disease     Dr Melissa garcia- goitre+       Objective:        Physical Exam  Constitutional:       General: She is not in acute distress.     Appearance: She is well-developed. She is not diaphoretic.   HENT:      Head: Normocephalic and atraumatic.      Right Ear: External ear normal.      Left Ear:  External ear normal.      Nose: Nose normal.   Eyes:      General:         Right eye: No discharge.         Left eye: No discharge.   Pulmonary:      Effort: Pulmonary effort is normal. No respiratory distress.      Breath sounds: No stridor.   Musculoskeletal:         General: Normal range of motion.      Cervical back: Normal range of motion.   Skin:     Coloration: Skin is not pale.   Neurological:      Mental Status: She is alert and oriented to person, place, and time.      Motor: No abnormal muscle tone.      Coordination: Coordination normal.   Psychiatric:         Behavior: Behavior normal.         Thought Content: Thought content normal.         Judgment: Judgment normal.           Assessment / Plan:     Type 2 diabetes mellitus with hyperglycemia, with long-term current use of insulin  -     Hemoglobin A1C; Future; Expected date: 04/07/2025    Hypertension associated with diabetes    Hyperlipidemia, unspecified hyperlipidemia type    Statin myopathy    Screening mammogram for breast cancer  -     Mammo Digital Screening Bilat w/ Mo (XPD); Future; Expected date: 04/07/2025      Additional Plan Details:    - POCT Glucose  - Encouraged continuation of lifestyle changes including regular exercise and limiting carbohydrates to 30-45 grams per meal threes times daily and 15 grams per snack with a limit of two daily.   - Encouraged continued monitoring of blood glucose with maintenance of 4 times daily and Continuously with personal CGM Dexcom.   - Current DM Medication Regimen: Continue Novolog to IC 7 (or 1 unit for every 7g of carb) and correction dosing per scale below.   - A1c in June  - Health Maintenance standards addressed today: Eye Exam - will be completed outside of [facility] and patient will schedule, Flu Shot - patient would like to complete outside of [facility], COVID - 19 Vaccine - patient will schedule outside of [facility] , and RSV vaccine and mammo - orders placed  - Nursing Visit: Patient is age 80  or younger with an A1c of 7.5 or greater and will not need nursing visit at this time .   - Follow up in 10 wks.    CURRENT DM MEDICATIONS:   Novolog every 3 hours as needed   Novolog IC 7 (or 1 unit for every 7g of carbs)- dose 10 mins before meal                 To Correct Blood Sugar (TO add to meal time dose OR to be used as correction after 3 hours if not eating:    - 180: +2 units   - 200: +3 units    - 220: +4 units   - 240: +5 units   - 260: +6 units   - 280: +7 units   - 300: +8 units   - 320: +9 units   - 340: +10 units   - 380: +11 units   - 400: +12 units  BG +400: +13 units - MAX Blakeney McKnight, PA-C [facility]

## 2025-05-07 ENCOUNTER — DOCUMENT SCAN (OUTPATIENT)
Dept: HOME HEALTH SERVICES | Facility: HOSPITAL | Age: 65
End: 2025-05-07
Payer: COMMERCIAL

## 2025-05-27 ENCOUNTER — PATIENT OUTREACH (OUTPATIENT)
Dept: ADMINISTRATIVE | Facility: HOSPITAL | Age: 65
End: 2025-05-27
Payer: COMMERCIAL

## 2025-06-05 ENCOUNTER — EXTERNAL HOME HEALTH (OUTPATIENT)
Dept: HOME HEALTH SERVICES | Facility: HOSPITAL | Age: 65
End: 2025-06-05
Payer: COMMERCIAL

## 2025-08-15 ENCOUNTER — PATIENT OUTREACH (OUTPATIENT)
Dept: ADMINISTRATIVE | Facility: HOSPITAL | Age: 65
End: 2025-08-15
Payer: COMMERCIAL

## 2025-08-19 ENCOUNTER — PATIENT MESSAGE (OUTPATIENT)
Dept: ADMINISTRATIVE | Facility: HOSPITAL | Age: 65
End: 2025-08-19
Payer: COMMERCIAL

## 2025-08-27 DIAGNOSIS — E11.9 TYPE 2 DIABETES MELLITUS WITHOUT COMPLICATION: ICD-10-CM
